# Patient Record
Sex: FEMALE | Race: WHITE | NOT HISPANIC OR LATINO | Employment: OTHER | URBAN - METROPOLITAN AREA
[De-identification: names, ages, dates, MRNs, and addresses within clinical notes are randomized per-mention and may not be internally consistent; named-entity substitution may affect disease eponyms.]

---

## 2017-03-13 ENCOUNTER — APPOINTMENT (OUTPATIENT)
Dept: CT IMAGING | Facility: HOSPITAL | Age: 82
End: 2017-03-13

## 2017-03-13 ENCOUNTER — HOSPITAL ENCOUNTER (EMERGENCY)
Facility: HOSPITAL | Age: 82
Discharge: HOME | End: 2017-03-13
Attending: EMERGENCY MEDICINE | Admitting: EMERGENCY MEDICINE

## 2017-03-13 ENCOUNTER — APPOINTMENT (OUTPATIENT)
Dept: GENERAL RADIOLOGY | Facility: HOSPITAL | Age: 82
End: 2017-03-13

## 2017-03-13 VITALS
HEART RATE: 61 BPM | DIASTOLIC BLOOD PRESSURE: 83 MMHG | HEIGHT: 64 IN | WEIGHT: 149 LBS | OXYGEN SATURATION: 98 % | TEMPERATURE: 98.4 F | BODY MASS INDEX: 25.44 KG/M2 | SYSTOLIC BLOOD PRESSURE: 164 MMHG | RESPIRATION RATE: 18 BRPM

## 2017-03-13 DIAGNOSIS — N39.0 ACUTE UTI: Primary | ICD-10-CM

## 2017-03-13 LAB
ALBUMIN SERPL-MCNC: 3.9 G/DL (ref 3.5–5.2)
ALBUMIN/GLOB SERPL: 1.3 G/DL
ALP SERPL-CCNC: 51 U/L (ref 39–117)
ALT SERPL W P-5'-P-CCNC: 15 U/L (ref 1–33)
ANION GAP SERPL CALCULATED.3IONS-SCNC: 11.6 MMOL/L
AST SERPL-CCNC: 19 U/L (ref 1–32)
BACTERIA UR QL AUTO: ABNORMAL /HPF
BASOPHILS # BLD AUTO: 0.02 10*3/MM3 (ref 0–0.2)
BASOPHILS NFR BLD AUTO: 0.3 % (ref 0–1.5)
BILIRUB SERPL-MCNC: 0.5 MG/DL (ref 0.1–1.2)
BILIRUB UR QL STRIP: NEGATIVE
BUN BLD-MCNC: 15 MG/DL (ref 8–23)
BUN/CREAT SERPL: 13.8 (ref 7–25)
CALCIUM SPEC-SCNC: 9.9 MG/DL (ref 8.2–9.6)
CHLORIDE SERPL-SCNC: 105 MMOL/L (ref 98–107)
CLARITY UR: CLEAR
CO2 SERPL-SCNC: 26.4 MMOL/L (ref 22–29)
COLOR UR: YELLOW
CREAT BLD-MCNC: 1.09 MG/DL (ref 0.57–1)
DEPRECATED RDW RBC AUTO: 48.5 FL (ref 37–54)
EOSINOPHIL # BLD AUTO: 0.18 10*3/MM3 (ref 0–0.7)
EOSINOPHIL NFR BLD AUTO: 2.9 % (ref 0.3–6.2)
ERYTHROCYTE [DISTWIDTH] IN BLOOD BY AUTOMATED COUNT: 15.1 % (ref 11.7–13)
GFR SERPL CREATININE-BSD FRML MDRD: 47 ML/MIN/1.73
GLOBULIN UR ELPH-MCNC: 3 GM/DL
GLUCOSE BLD-MCNC: 105 MG/DL (ref 65–99)
GLUCOSE UR STRIP-MCNC: NEGATIVE MG/DL
HCT VFR BLD AUTO: 41.6 % (ref 35.6–45.5)
HGB BLD-MCNC: 13.7 G/DL (ref 11.9–15.5)
HGB UR QL STRIP.AUTO: NEGATIVE
HYALINE CASTS UR QL AUTO: ABNORMAL /LPF
IMM GRANULOCYTES # BLD: 0 10*3/MM3 (ref 0–0.03)
IMM GRANULOCYTES NFR BLD: 0 % (ref 0–0.5)
KETONES UR QL STRIP: NEGATIVE
LEUKOCYTE ESTERASE UR QL STRIP.AUTO: ABNORMAL
LYMPHOCYTES # BLD AUTO: 1.62 10*3/MM3 (ref 0.9–4.8)
LYMPHOCYTES NFR BLD AUTO: 26.2 % (ref 19.6–45.3)
MCH RBC QN AUTO: 28.6 PG (ref 26.9–32)
MCHC RBC AUTO-ENTMCNC: 32.9 G/DL (ref 32.4–36.3)
MCV RBC AUTO: 86.8 FL (ref 80.5–98.2)
MONOCYTES # BLD AUTO: 0.52 10*3/MM3 (ref 0.2–1.2)
MONOCYTES NFR BLD AUTO: 8.4 % (ref 5–12)
NEUTROPHILS # BLD AUTO: 3.84 10*3/MM3 (ref 1.9–8.1)
NEUTROPHILS NFR BLD AUTO: 62.2 % (ref 42.7–76)
NITRITE UR QL STRIP: POSITIVE
PH UR STRIP.AUTO: 7.5 [PH] (ref 5–8)
PLATELET # BLD AUTO: 214 10*3/MM3 (ref 140–500)
PMV BLD AUTO: 10.8 FL (ref 6–12)
POTASSIUM BLD-SCNC: 4 MMOL/L (ref 3.5–5.2)
PROT SERPL-MCNC: 6.9 G/DL (ref 6–8.5)
PROT UR QL STRIP: NEGATIVE
RBC # BLD AUTO: 4.79 10*6/MM3 (ref 3.9–5.2)
RBC # UR: ABNORMAL /HPF
REF LAB TEST METHOD: ABNORMAL
SODIUM BLD-SCNC: 143 MMOL/L (ref 136–145)
SP GR UR STRIP: 1.01 (ref 1–1.03)
SQUAMOUS #/AREA URNS HPF: ABNORMAL /HPF
TROPONIN T SERPL-MCNC: <0.01 NG/ML (ref 0–0.03)
UROBILINOGEN UR QL STRIP: ABNORMAL
WBC NRBC COR # BLD: 6.18 10*3/MM3 (ref 4.5–10.7)
WBC UR QL AUTO: ABNORMAL /HPF

## 2017-03-13 PROCEDURE — 99285 EMERGENCY DEPT VISIT HI MDM: CPT

## 2017-03-13 PROCEDURE — 87186 SC STD MICRODIL/AGAR DIL: CPT | Performed by: EMERGENCY MEDICINE

## 2017-03-13 PROCEDURE — 84484 ASSAY OF TROPONIN QUANT: CPT | Performed by: EMERGENCY MEDICINE

## 2017-03-13 PROCEDURE — 81001 URINALYSIS AUTO W/SCOPE: CPT | Performed by: EMERGENCY MEDICINE

## 2017-03-13 PROCEDURE — 85025 COMPLETE CBC W/AUTO DIFF WBC: CPT | Performed by: EMERGENCY MEDICINE

## 2017-03-13 PROCEDURE — 93010 ELECTROCARDIOGRAM REPORT: CPT | Performed by: INTERNAL MEDICINE

## 2017-03-13 PROCEDURE — 80053 COMPREHEN METABOLIC PANEL: CPT | Performed by: EMERGENCY MEDICINE

## 2017-03-13 PROCEDURE — 87086 URINE CULTURE/COLONY COUNT: CPT | Performed by: EMERGENCY MEDICINE

## 2017-03-13 PROCEDURE — 71020 HC CHEST PA AND LATERAL: CPT

## 2017-03-13 PROCEDURE — 70450 CT HEAD/BRAIN W/O DYE: CPT

## 2017-03-13 PROCEDURE — 93005 ELECTROCARDIOGRAM TRACING: CPT | Performed by: EMERGENCY MEDICINE

## 2017-03-13 RX ORDER — CEPHALEXIN 500 MG/1
500 CAPSULE ORAL 3 TIMES DAILY
Qty: 21 CAPSULE | Refills: 0 | Status: ON HOLD | OUTPATIENT
Start: 2017-03-13 | End: 2017-05-29

## 2017-03-13 RX ORDER — SODIUM CHLORIDE 0.9 % (FLUSH) 0.9 %
10 SYRINGE (ML) INJECTION AS NEEDED
Status: DISCONTINUED | OUTPATIENT
Start: 2017-03-13 | End: 2017-03-13 | Stop reason: HOSPADM

## 2017-03-13 NOTE — ED NOTES
Pt denies shortness of breath, chest pains and weakness at this time. Pt states that she called [phone] this morning because she woke up in a panic. Pt called a neighbor and told them that both of her legs felt weak and that she may need help. The neighbor called 911 for the pt in case the patient was having a stroke. Pt denies history of stroke. Pt does not have any facial droop, extremity weakness or altered mental status at this time.      Ralph León RN  03/13/17 2052

## 2017-03-13 NOTE — ED NOTES
Pt refused catheter urine specimen and was assisted x2 with getting up and using a bedside comode for a clean catch specimen.      Ralph León RN  03/13/17 0319

## 2017-03-13 NOTE — ED PROVIDER NOTES
" EMERGENCY DEPARTMENT ENCOUNTER    CHIEF COMPLAINT  Chief Complaint: Feeling Panicked  History given by: Patient  History limited by: Patient is a vague historian   Room Number: 14/14  PMD: Waqar Melissa MD      HPI:  Pt reports that she awoke today \"feeling panicked\". Pt reports, \"I just wanted someone to talk to\", so she called her neighbors, who called the paramedics. Pt denies CP, focal lateral weakness/numbness, dyspnea, abd pain, and N/V/D. Pt states that she has had weakness to the lower extremities bilaterally (which has improved since initial onset). Pt reports that she had an endoscopy performed on 03/08/17 at another facility for recurrent episodes of foreign body sensation in throat. There are no other complaints at this time.     Location: To the lower extremities bilaterally  Radiation: None  Quality: \"weakness\"  Intensity/Severity: Moderate  Duration: Pt noticed sx upon waking today  Onset quality: Gradual  Timing: Constant  Progression: Improving  Aggravating Factors: Nothing  Alleviating Factors: Nothing  Previous Episodes: None  Treatment before arrival: None  Associated Symptoms: None      PAST MEDICAL HISTORY  Active Ambulatory Problems     Diagnosis Date Noted   • Abdominal pain, vomiting, and diarrhea 03/16/2016   • Chronic constipation 03/16/2016   • Hemorrhoid 03/16/2016   • BP (high blood pressure) 03/16/2016   • Arthralgia of hip 03/16/2016   • LBP (low back pain) 03/16/2016   • Disease of thyroid gland 03/16/2016     Resolved Ambulatory Problems     Diagnosis Date Noted   • No Resolved Ambulatory Problems     Past Medical History   Diagnosis Date   • Arthritis    • CHF (congestive heart failure)    • Coronary artery disease    • Diverticulitis    • Hypertension          PAST SURGICAL HISTORY  Past Surgical History   Procedure Laterality Date   • Tonsillectomy     • Hysterectomy     • Appendectomy     • Colon surgery       fistulectomy   • Abdominal surgery       liban   • Eye " surgery       cataract         FAMILY HISTORY  History reviewed. No pertinent family history.      SOCIAL HISTORY  Social History     Social History   • Marital status: Single     Spouse name: N/A   • Number of children: N/A   • Years of education: N/A     Occupational History   • Not on file.     Social History Main Topics   • Smoking status: Never Smoker   • Smokeless tobacco: Not on file   • Alcohol use Not on file   • Drug use: Not on file   • Sexual activity: Not on file     Other Topics Concern   • Not on file     Social History Narrative         ALLERGIES  Amoxicillin; Atorvastatin; Diazepam; Levoxyl  [levothyroxine sodium]; Lexapro  [escitalopram oxalate]; Lopressor  [metoprolol tartrate]; Metaxalone; Omeprazole; and Penicillins        REVIEW OF SYSTEMS  Review of Systems   Unable to perform ROS: Other (pt is a vague historian)   Respiratory: Negative for shortness of breath.    Cardiovascular: Negative for chest pain.   Gastrointestinal: Negative for abdominal pain, diarrhea, nausea and vomiting.   Neurological: Positive for weakness (bilateral lower extremities, but no focal lateral weakness). Negative for numbness.            PHYSICAL EXAM  ED Triage Vitals   Temp Heart Rate Resp BP SpO2   -- 03/13/17 1133 03/13/17 1133 03/13/17 1133 03/13/17 1133    65 18 206/81 98 % WNL      Temp src Heart Rate Source Patient Position BP Location FiO2 (%)   -- 03/13/17 1133 -- -- --    Monitor          Physical Exam   Constitutional: She is oriented to person, place, and time. No distress.   HENT:   Head: Normocephalic.   Mouth/Throat: Mucous membranes are normal.   Eyes: EOM are normal. Pupils are equal, round, and reactive to light.   Neck: Normal range of motion. Neck supple.   Cardiovascular: Normal rate, regular rhythm and normal heart sounds.    Pulmonary/Chest: Effort normal and breath sounds normal. No respiratory distress. She has no decreased breath sounds. She has no wheezes. She has no rhonchi. She has no  rales.   Abdominal: Soft. There is no tenderness. There is no rebound and no guarding.   Musculoskeletal: Normal range of motion.   Neurological: She is alert and oriented to person, place, and time. She has normal sensation and normal strength.   Pt has no weakness to the lower extremities bilaterally    Skin: Skin is warm and dry.   Psychiatric: Mood and affect normal.   Nursing note and vitals reviewed.          LAB RESULTS  Recent Results (from the past 24 hour(s))   Comprehensive Metabolic Panel    Collection Time: 03/13/17 11:55 AM   Result Value Ref Range    Glucose 105 (H) 65 - 99 mg/dL    BUN 15 8 - 23 mg/dL    Creatinine 1.09 (H) 0.57 - 1.00 mg/dL    Sodium 143 136 - 145 mmol/L    Potassium 4.0 3.5 - 5.2 mmol/L    Chloride 105 98 - 107 mmol/L    CO2 26.4 22.0 - 29.0 mmol/L    Calcium 9.9 (H) 8.2 - 9.6 mg/dL    Total Protein 6.9 6.0 - 8.5 g/dL    Albumin 3.90 3.50 - 5.20 g/dL    ALT (SGPT) 15 1 - 33 U/L    AST (SGOT) 19 1 - 32 U/L    Alkaline Phosphatase 51 39 - 117 U/L    Total Bilirubin 0.5 0.1 - 1.2 mg/dL    eGFR Non African Amer 47 (L) >60 mL/min/1.73    Globulin 3.0 gm/dL    A/G Ratio 1.3 g/dL    BUN/Creatinine Ratio 13.8 7.0 - 25.0    Anion Gap 11.6 mmol/L   Troponin    Collection Time: 03/13/17 11:55 AM   Result Value Ref Range    Troponin T <0.010 0.000 - 0.030 ng/mL   CBC Auto Differential    Collection Time: 03/13/17 11:55 AM   Result Value Ref Range    WBC 6.18 4.50 - 10.70 10*3/mm3    RBC 4.79 3.90 - 5.20 10*6/mm3    Hemoglobin 13.7 11.9 - 15.5 g/dL    Hematocrit 41.6 35.6 - 45.5 %    MCV 86.8 80.5 - 98.2 fL    MCH 28.6 26.9 - 32.0 pg    MCHC 32.9 32.4 - 36.3 g/dL    RDW 15.1 (H) 11.7 - 13.0 %    RDW-SD 48.5 37.0 - 54.0 fl    MPV 10.8 6.0 - 12.0 fL    Platelets 214 140 - 500 10*3/mm3    Neutrophil % 62.2 42.7 - 76.0 %    Lymphocyte % 26.2 19.6 - 45.3 %    Monocyte % 8.4 5.0 - 12.0 %    Eosinophil % 2.9 0.3 - 6.2 %    Basophil % 0.3 0.0 - 1.5 %    Immature Grans % 0.0 0.0 - 0.5 %    Neutrophils,  Absolute 3.84 1.90 - 8.10 10*3/mm3    Lymphocytes, Absolute 1.62 0.90 - 4.80 10*3/mm3    Monocytes, Absolute 0.52 0.20 - 1.20 10*3/mm3    Eosinophils, Absolute 0.18 0.00 - 0.70 10*3/mm3    Basophils, Absolute 0.02 0.00 - 0.20 10*3/mm3    Immature Grans, Absolute 0.00 0.00 - 0.03 10*3/mm3   Urinalysis With / Culture If Indicated    Collection Time: 03/13/17  1:33 PM   Result Value Ref Range    Color, UA Yellow Yellow, Straw    Appearance, UA Clear Clear    pH, UA 7.5 5.0 - 8.0    Specific Gravity, UA 1.007 1.005 - 1.030    Glucose, UA Negative Negative    Ketones, UA Negative Negative    Bilirubin, UA Negative Negative    Blood, UA Negative Negative    Protein, UA Negative Negative    Leuk Esterase, UA Trace (A) Negative    Nitrite, UA Positive (A) Negative    Urobilinogen, UA 0.2 E.U./dL 0.2 - 1.0 E.U./dL   Urinalysis, Microscopic Only    Collection Time: 03/13/17  1:33 PM   Result Value Ref Range    RBC, UA 0-2 None Seen, 0-2 /HPF    WBC, UA 6-12 (A) None Seen, 0-2 /HPF    Bacteria, UA 4+ (A) None Seen /HPF    Squamous Epithelial Cells, UA 0-2 None Seen, 0-2 /HPF    Hyaline Casts, UA None Seen None Seen /LPF    Methodology Automated Microscopy        Ordered the above labs and reviewed the results.        RADIOLOGY  CT Head Without Contrast - Negative acute. Interpreted by radiologist. Independently viewed by me.      XR Chest 2 View - Negative acute. Interpreted by radiologist. Independently viewed by me.             Ordered the above noted radiological studies. Reviewed by me in PACS.       PROCEDURES  Procedures        EKG:           EKG time: 11:54 AM  Rhythm/Rate: NSR rate 62  PVC present  QRS, axis: Normal QRS   ST and T waves: Normal ST     Interpreted Contemporaneously by me, independently viewed  Similar compared to prior 07/03/16      PROGRESS AND CONSULTS  ED Course   Comment By Time   2:26 PM  Patient presents with nonspecific symptoms of unclear etiology.  Denies any chest pain shortness of breath or  belly pain.  Patient does have evidence of urinary tract infection with positive nitrites and bacteria and white blood cells.  Patient will be given Keflex.  She is to follow-up with her doctor.  She is to return for any concerns. Zackery Zabala MD 03/13 1426     11:54 AM: UA, blood work, CXR, CT Head, and EKG ordered for further evaluation.     2:21 PM: Rechecked pt. Pt is resting comfortably and appears in no acute distress. Informed pt that her troponin is negative. CT Head and CXR are unremarkable. Pt's UA suggests that pt has a mild UTI --> will prescribe rx for abx. Pt advised to f/u with PMD. RTER warnings given. Pt agrees with plan for discharge.     Pt reports that her allergy to penicillins was very long ago and she is unsure what the allergy was.             MEDICAL DECISION MAKING      MDM  Number of Diagnoses or Management Options     Amount and/or Complexity of Data Reviewed  Clinical lab tests: ordered and reviewed (Troponin is negative.)  Tests in the radiology section of CPT®: ordered and reviewed (CT Head is negative acute. )  Tests in the medicine section of CPT®: reviewed and ordered (EKG interpreted.   )    Patient Progress  Patient progress: stable             DIAGNOSIS  Final diagnoses:   Acute UTI         DISPOSITION  Pt discharged.    DISCHARGE    Patient discharged in stable condition.    Reviewed implications of results, diagnosis, meds, responsibility to follow up, warning signs and symptoms of possible worsening, potential complications and reasons to return to ER.    Patient/Family voiced understanding of above instructions.    Discussed plan for discharge, as there is no emergent indication for admission.  Pt/family is agreeable and understands need for follow up and repeat testing.  Pt is aware that discharge does not mean that nothing is wrong but it indicates no emergency is present that requires admission and they must continue care with follow-up as given below or physician of  their choice.     FOLLOW-UP  Waqar Melissa MD  [address]  [address]  [place] [id number]  [phone]    Schedule an appointment as soon as possible for a visit           Medication List      Current Discharge Medication List      START taking these medications    Details   cephalexin (KEFLEX) 500 MG capsule Take 1 capsule by mouth 3 (Three) Times a Day.  Qty: 21 capsule, Refills: 0             Latest Documented Vital Signs:  As of 2:30 PM  BP- 164/83 HR- 61 Temp- 98.4 °F (36.9 °C) (Oral) O2 sat- 98%        --  Documentation assistance provided by henrique Johnson for Dr. Vj MD.  Information recorded by the scribe was done at my direction and has been verified and validated by me.                           Dav Johnson  03/13/17 1431       Zackery Zabala MD  03/13/17 1958

## 2017-03-13 NOTE — ED NOTES
Pt discharged with discharge instructions. Taught pt importance of taking medications as prescribed. Pt going home with friend, per pt request. Pt alert and oriented x4, pt was wheeled to front entrance. Pt had no questions at this time.     Shamika Corrales RN  03/13/17 [id number]

## 2017-03-15 LAB — BACTERIA SPEC AEROBE CULT: ABNORMAL

## 2017-03-16 ENCOUNTER — APPOINTMENT (OUTPATIENT)
Dept: GENERAL RADIOLOGY | Facility: HOSPITAL | Age: 82
End: 2017-03-16

## 2017-03-16 ENCOUNTER — TELEPHONE (OUTPATIENT)
Dept: URGENT CARE | Facility: CLINIC | Age: 82
End: 2017-03-16

## 2017-03-16 ENCOUNTER — HOSPITAL ENCOUNTER (EMERGENCY)
Facility: HOSPITAL | Age: 82
Discharge: HOME | End: 2017-03-17
Attending: EMERGENCY MEDICINE | Admitting: EMERGENCY MEDICINE

## 2017-03-16 ENCOUNTER — APPOINTMENT (OUTPATIENT)
Dept: CT IMAGING | Facility: HOSPITAL | Age: 82
End: 2017-03-16

## 2017-03-16 DIAGNOSIS — K59.00 CONSTIPATION, UNSPECIFIED CONSTIPATION TYPE: Primary | ICD-10-CM

## 2017-03-16 LAB
ALBUMIN SERPL-MCNC: 4.1 G/DL (ref 3.5–5.2)
ALBUMIN/GLOB SERPL: 1.3 G/DL
ALP SERPL-CCNC: 55 U/L (ref 39–117)
ALT SERPL W P-5'-P-CCNC: 17 U/L (ref 1–33)
ANION GAP SERPL CALCULATED.3IONS-SCNC: 13.3 MMOL/L
AST SERPL-CCNC: 23 U/L (ref 1–32)
BASOPHILS # BLD AUTO: 0.03 10*3/MM3 (ref 0–0.2)
BASOPHILS NFR BLD AUTO: 0.3 % (ref 0–1.5)
BILIRUB SERPL-MCNC: 0.6 MG/DL (ref 0.1–1.2)
BILIRUB UR QL STRIP: NEGATIVE
BUN BLD-MCNC: 15 MG/DL (ref 8–23)
BUN/CREAT SERPL: 14.3 (ref 7–25)
CALCIUM SPEC-SCNC: 9.7 MG/DL (ref 8.2–9.6)
CHLORIDE SERPL-SCNC: 96 MMOL/L (ref 98–107)
CLARITY UR: CLEAR
CO2 SERPL-SCNC: 24.7 MMOL/L (ref 22–29)
COLOR UR: YELLOW
CREAT BLD-MCNC: 1.05 MG/DL (ref 0.57–1)
DEPRECATED RDW RBC AUTO: 48.2 FL (ref 37–54)
EOSINOPHIL # BLD AUTO: 0.17 10*3/MM3 (ref 0–0.7)
EOSINOPHIL NFR BLD AUTO: 1.9 % (ref 0.3–6.2)
ERYTHROCYTE [DISTWIDTH] IN BLOOD BY AUTOMATED COUNT: 15 % (ref 11.7–13)
GFR SERPL CREATININE-BSD FRML MDRD: 49 ML/MIN/1.73
GLOBULIN UR ELPH-MCNC: 3.1 GM/DL
GLUCOSE BLD-MCNC: 88 MG/DL (ref 65–99)
GLUCOSE UR STRIP-MCNC: NEGATIVE MG/DL
HCT VFR BLD AUTO: 42.2 % (ref 35.6–45.5)
HGB BLD-MCNC: 14 G/DL (ref 11.9–15.5)
HGB UR QL STRIP.AUTO: NEGATIVE
IMM GRANULOCYTES # BLD: 0.03 10*3/MM3 (ref 0–0.03)
IMM GRANULOCYTES NFR BLD: 0.3 % (ref 0–0.5)
KETONES UR QL STRIP: NEGATIVE
LEUKOCYTE ESTERASE UR QL STRIP.AUTO: NEGATIVE
LYMPHOCYTES # BLD AUTO: 2 10*3/MM3 (ref 0.9–4.8)
LYMPHOCYTES NFR BLD AUTO: 22.6 % (ref 19.6–45.3)
MCH RBC QN AUTO: 29.2 PG (ref 26.9–32)
MCHC RBC AUTO-ENTMCNC: 33.2 G/DL (ref 32.4–36.3)
MCV RBC AUTO: 87.9 FL (ref 80.5–98.2)
MONOCYTES # BLD AUTO: 0.79 10*3/MM3 (ref 0.2–1.2)
MONOCYTES NFR BLD AUTO: 8.9 % (ref 5–12)
NEUTROPHILS # BLD AUTO: 5.83 10*3/MM3 (ref 1.9–8.1)
NEUTROPHILS NFR BLD AUTO: 66 % (ref 42.7–76)
NITRITE UR QL STRIP: NEGATIVE
PH UR STRIP.AUTO: 7 [PH] (ref 5–8)
PLATELET # BLD AUTO: 225 10*3/MM3 (ref 140–500)
PMV BLD AUTO: 11 FL (ref 6–12)
POTASSIUM BLD-SCNC: 3.8 MMOL/L (ref 3.5–5.2)
PROT SERPL-MCNC: 7.2 G/DL (ref 6–8.5)
PROT UR QL STRIP: NEGATIVE
RBC # BLD AUTO: 4.8 10*6/MM3 (ref 3.9–5.2)
SODIUM BLD-SCNC: 134 MMOL/L (ref 136–145)
SP GR UR STRIP: 1.01 (ref 1–1.03)
UROBILINOGEN UR QL STRIP: NORMAL
WBC NRBC COR # BLD: 8.85 10*3/MM3 (ref 4.5–10.7)

## 2017-03-16 PROCEDURE — 74177 CT ABD & PELVIS W/CONTRAST: CPT

## 2017-03-16 PROCEDURE — 96360 HYDRATION IV INFUSION INIT: CPT

## 2017-03-16 PROCEDURE — 96361 HYDRATE IV INFUSION ADD-ON: CPT

## 2017-03-16 PROCEDURE — 81003 URINALYSIS AUTO W/O SCOPE: CPT | Performed by: EMERGENCY MEDICINE

## 2017-03-16 PROCEDURE — 80053 COMPREHEN METABOLIC PANEL: CPT | Performed by: NURSE PRACTITIONER

## 2017-03-16 PROCEDURE — 99284 EMERGENCY DEPT VISIT MOD MDM: CPT

## 2017-03-16 PROCEDURE — 0 IOPAMIDOL 61 % SOLUTION: Performed by: EMERGENCY MEDICINE

## 2017-03-16 PROCEDURE — 90791 PSYCH DIAGNOSTIC EVALUATION: CPT

## 2017-03-16 PROCEDURE — 85025 COMPLETE CBC W/AUTO DIFF WBC: CPT | Performed by: NURSE PRACTITIONER

## 2017-03-16 RX ORDER — POLYETHYLENE GLYCOL 3350 17 G/17G
17 POWDER, FOR SOLUTION ORAL DAILY
Qty: 30 EACH | Refills: 0 | Status: ON HOLD | OUTPATIENT
Start: 2017-03-16 | End: 2017-05-29

## 2017-03-16 RX ORDER — SODIUM CHLORIDE 0.9 % (FLUSH) 0.9 %
10 SYRINGE (ML) INJECTION AS NEEDED
Status: DISCONTINUED | OUTPATIENT
Start: 2017-03-16 | End: 2017-03-17 | Stop reason: HOSPADM

## 2017-03-16 RX ADMIN — IOPAMIDOL 85 ML: 612 INJECTION, SOLUTION INTRAVENOUS at 21:45

## 2017-03-16 RX ADMIN — SODIUM CHLORIDE 1000 ML: 9 INJECTION, SOLUTION INTRAVENOUS at 21:29

## 2017-03-16 NOTE — TELEPHONE ENCOUNTER
Patient called and said she was very constipated she called her doctor(not sure which one) and they advised her to take miralax and drink plenty of water.  She stated her last bowl movement was 3/13/17.  She stated she was very nervous and had taken 1/2 of a nerve pill.  She stated she did not know what to do.  Per Dr. Beckman she was advised to go to the ED.  I ask her if she would like for me to call EMS.  She stated no, I will call my friend, if I don't reach her I will call you back.  I again offered to call EMS, she declined the offer. [place] EDWARDS

## 2017-03-17 VITALS
HEART RATE: 80 BPM | HEIGHT: 64 IN | RESPIRATION RATE: 16 BRPM | OXYGEN SATURATION: 98 % | DIASTOLIC BLOOD PRESSURE: 87 MMHG | SYSTOLIC BLOOD PRESSURE: 178 MMHG | WEIGHT: 151 LBS | BODY MASS INDEX: 25.78 KG/M2 | TEMPERATURE: 98.7 F

## 2017-03-17 PROCEDURE — 90791 PSYCH DIAGNOSTIC EVALUATION: CPT

## 2017-03-17 NOTE — CONSULTS
"89 yo white female evaluated in ED (Room#16) BIB friend Fatimah Rivera. Patient c/o constipation. Single, no children. Estranged from family. States her friend Fatimah is an \"shalini\". Patient completed a swallow study recently at [facility] and was told she may get constipated as a result. Patient lives alone but states she has help from her friends. Patient very angry with her family stating she wants to \"slap Sophy in the face\". Sophy is patient's sister. Patient also states she has \"internal hemorrhoids\". Patient is not suicidal. Patient is not homicidal. No overt psychosis. Patient denies previous psychiatric history. Appears alert and oriented times 4. Patient does not meet criteria for inpatient psych admit. Patient not interested in outpatient therapy. Patient focused on her constipation. Constipation is chronic per chart. Will refer back to ED.   "

## 2017-03-17 NOTE — ED NOTES
Changed pt and noted redness to percy area and redness on buttocks.      Amalia Valles RN  03/16/17 3570

## 2017-03-17 NOTE — ED PROVIDER NOTES
"  EMERGENCY DEPARTMENT ENCOUNTER    CHIEF COMPLAINT  Chief Complaint: constipation  History given by: patient  History limited by: N/A  Room Number: 16/16  PMD: Waqar Melissa MD      HPI:  Pt is a 89 y.o. female who presents with constipation which started about 1 week ago (last reported bowel movement was on 3/8/17). She has also had \"fullness in upper abd\" and nausea. She denies vomiting, diarrhea, chest pain, trouble breathing, fevers, chills, acute change in chronic dysphagia, and acute change in chronic bladder incontinence. She reports that she called her PMD's office regarding current sx and was advised to take miralax and to drink plenty of fluids. However, despite following these recommendations, her sx have not improved. She reports undergoing imaging of her esophagus on 3/8/17 prior to sx onset. Past Medical History of internal hemorrhoids, diverticulitis, CAD, C Diff colitis, and HTN.     Duration: started about 1 week ago  Timing: constant  Location: GI  Radiation: none  Quality: fullness  Intensity/Severity: moderate  Progression: unchanged  Associated Symptoms: \"fullness in upper abd\", nausea  Aggravating Factors: eating  Alleviating Factors: none  Previous Episodes: yes  Treatment before arrival: pt took miralax and drank fluids without sx relief     PAST MEDICAL HISTORY  Active Ambulatory Problems     Diagnosis Date Noted   • Abdominal pain, vomiting, and diarrhea 03/16/2016   • Chronic constipation 03/16/2016   • Hemorrhoid 03/16/2016   • BP (high blood pressure) 03/16/2016   • Arthralgia of hip 03/16/2016   • LBP (low back pain) 03/16/2016   • Disease of thyroid gland 03/16/2016     Resolved Ambulatory Problems     Diagnosis Date Noted   • No Resolved Ambulatory Problems     Past Medical History   Diagnosis Date   • Arthritis    • CHF (congestive heart failure)    • Coronary artery disease    • Diverticulitis    • Hypertension        PAST SURGICAL HISTORY  Past Surgical History   Procedure " "Laterality Date   • Tonsillectomy     • Hysterectomy     • Appendectomy     • Colon surgery       fistulectomy   • Abdominal surgery       liban   • Eye surgery       cataract       FAMILY HISTORY  History reviewed. No pertinent family history.    SOCIAL HISTORY  Social History     Social History   • Marital status: Single     Spouse name: N/A   • Number of children: N/A   • Years of education: N/A     Occupational History   • Not on file.     Social History Main Topics   • Smoking status: Never Smoker   • Smokeless tobacco: Not on file   • Alcohol use Not on file   • Drug use: Not on file   • Sexual activity: Not on file     Other Topics Concern   • Not on file     Social History Narrative         ALLERGIES  Amoxicillin; Atorvastatin; Diazepam; Levoxyl  [levothyroxine sodium]; Lexapro  [escitalopram oxalate]; Lopressor  [metoprolol tartrate]; Metaxalone; Omeprazole; and Penicillins    REVIEW OF SYSTEMS  Review of Systems   Constitutional: Negative for chills and fever.   HENT: Negative for sore throat. Trouble swallowing: chronic, no acute change.    Respiratory: Negative for cough and shortness of breath.    Cardiovascular: Negative for chest pain.   Gastrointestinal: Positive for abdominal pain (\"fullness in upper abd\"), constipation and nausea. Negative for diarrhea and vomiting.   Genitourinary: Negative for dysuria.        No acute change of chronic bladder incontinence   Musculoskeletal: Negative for back pain.   Skin: Negative for rash.   Neurological: Negative for dizziness.   Psychiatric/Behavioral: The patient is not nervous/anxious.        PHYSICAL EXAM  ED Triage Vitals   Temp Heart Rate Resp BP SpO2   03/1934 03/1934 03/1934 03/1934 03/1934   97.6 °F (36.4 °C) 84 18 203/97 96 % WNL       Physical Exam   Constitutional: She is oriented to person, place, and time and well-developed, well-nourished, and in no distress.   HENT:   Head: Normocephalic.   Mouth/Throat: Mucous " membranes are normal.   Eyes: No scleral icterus.   Neck: Normal range of motion.   Cardiovascular: Normal rate, regular rhythm and normal heart sounds.    Pulmonary/Chest: Effort normal and breath sounds normal. No respiratory distress.   Abdominal: Soft. She exhibits no distension. There is no tenderness. There is no rebound and no guarding.   Genitourinary: Rectal exam shows guaiac negative stool (heme negative).   Genitourinary Comments: No fecal impaction     Rectal exam performed with ER tech at bedside   Musculoskeletal: Normal range of motion.   Neurological: She is alert and oriented to person, place, and time. She has normal sensation and normal strength.   Skin: Skin is warm and dry.   Psychiatric: Her mood appears anxious.   Tearful    Nursing note and vitals reviewed.      LAB RESULTS  Recent Results (from the past 24 hour(s))   Comprehensive Metabolic Panel    Collection Time: 03/16/17  9:28 PM   Result Value Ref Range    Glucose 88 65 - 99 mg/dL    BUN 15 8 - 23 mg/dL    Creatinine 1.05 (H) 0.57 - 1.00 mg/dL    Sodium 134 (L) 136 - 145 mmol/L    Potassium 3.8 3.5 - 5.2 mmol/L    Chloride 96 (L) 98 - 107 mmol/L    CO2 24.7 22.0 - 29.0 mmol/L    Calcium 9.7 (H) 8.2 - 9.6 mg/dL    Total Protein 7.2 6.0 - 8.5 g/dL    Albumin 4.10 3.50 - 5.20 g/dL    ALT (SGPT) 17 1 - 33 U/L    AST (SGOT) 23 1 - 32 U/L    Alkaline Phosphatase 55 39 - 117 U/L    Total Bilirubin 0.6 0.1 - 1.2 mg/dL    eGFR Non African Amer 49 (L) >60 mL/min/1.73    Globulin 3.1 gm/dL    A/G Ratio 1.3 g/dL    BUN/Creatinine Ratio 14.3 7.0 - 25.0    Anion Gap 13.3 mmol/L   CBC Auto Differential    Collection Time: 03/16/17  9:28 PM   Result Value Ref Range    WBC 8.85 4.50 - 10.70 10*3/mm3    RBC 4.80 3.90 - 5.20 10*6/mm3    Hemoglobin 14.0 11.9 - 15.5 g/dL    Hematocrit 42.2 35.6 - 45.5 %    MCV 87.9 80.5 - 98.2 fL    MCH 29.2 26.9 - 32.0 pg    MCHC 33.2 32.4 - 36.3 g/dL    RDW 15.0 (H) 11.7 - 13.0 %    RDW-SD 48.2 37.0 - 54.0 fl    MPV 11.0  6.0 - 12.0 fL    Platelets 225 140 - 500 10*3/mm3    Neutrophil % 66.0 42.7 - 76.0 %    Lymphocyte % 22.6 19.6 - 45.3 %    Monocyte % 8.9 5.0 - 12.0 %    Eosinophil % 1.9 0.3 - 6.2 %    Basophil % 0.3 0.0 - 1.5 %    Immature Grans % 0.3 0.0 - 0.5 %    Neutrophils, Absolute 5.83 1.90 - 8.10 10*3/mm3    Lymphocytes, Absolute 2.00 0.90 - 4.80 10*3/mm3    Monocytes, Absolute 0.79 0.20 - 1.20 10*3/mm3    Eosinophils, Absolute 0.17 0.00 - 0.70 10*3/mm3    Basophils, Absolute 0.03 0.00 - 0.20 10*3/mm3    Immature Grans, Absolute 0.03 0.00 - 0.03 10*3/mm3   Urinalysis With / Culture If Indicated    Collection Time: 17 10:04 PM   Result Value Ref Range    Color, UA Yellow Yellow, Straw    Appearance, UA Clear Clear    pH, UA 7.0 5.0 - 8.0    Specific Gravity, UA 1.011 1.005 - 1.030    Glucose, UA Negative Negative    Ketones, UA Negative Negative    Bilirubin, UA Negative Negative    Blood, UA Negative Negative    Protein, UA Negative Negative    Leuk Esterase, UA Negative Negative    Nitrite, UA Negative Negative    Urobilinogen, UA 0.2 E.U./dL 0.2 - 1.0 E.U./dL       I ordered the above labs and reviewed the results      RADIOLOGY         CT Abdomen Pelvis With Contrast (Final result) Result time: 17 22:35:35     Final result by Arsh Landrum MD (17 22:35:35)     Narrative:     CT SCAN OF THE ABDOMEN AND PELVIS WITH INTRAVENOUS CONTRAST     HISTORY: Abdominal pain. Constipation.     FINDINGS: The CT scan was performed as an emergency procedure through  the abdomen and pelvis with intravenous contrast and demonstrates the  followin. There is a large amount of dense stool mixed with barium throughout  the colon, particularly in the descending and sigmoid colon. This is  combined with multiple slightly prominent fluid-filled small bowel loops  and the appearance is most consistent with barium and fecal impaction.  There is extensive sigmoid diverticulosis but no CT evidence of  diverticulitis.  This appearance of the sigmoid is similar to the study  of 03/16/2016.  2. The lung bases are clear. Allowing for some motion artifact and  artifact from the dense stool, the liver, spleen, pancreas, both adrenal  glands, and both kidneys are unremarkable.  3. There is slight aneurysmal enlargement of the infrarenal aorta  measuring 2.5 cm in diameter which is unchanged. There is no  retroperitoneal lymphadenopathy. The urinary bladder has a smooth  contour. The remainder of the CT scan is unremarkable.     This report was finalized on 3/16/2017 10:35 PM by Dr. Trell Landrum MD.            I ordered the above noted radiological studies and reviewed the images on the PACS system.  Spoke with Dr. Landrum (radiologist) regarding CT scan results        PROGRESS AND CONSULTS  9:07 PM- Reviewed pt's history and workup with Dr. Helton.  At bedside evaluation, they agree with the plan of care.  9:10 PM- Ordered IV fluids for hydration.   10:10 PM- CT abd/pel shows large stool burden. Ordered milk and molasses enema.  10:15 PM- Pt is feeling anxious. Consulted [facility] for further evaluation.   11:06 PM- Pt had no results with enema.   11:18 PM- [facility] RN has discussed with pt about possible home health options since she lives alone. Pt declined all offers.   11:32 PM- [facility] RN has seen and evaluated pt and has cleared her for discharge.  11:37 PM- Friend now at bedside. Rechecked pt. She is resting comfortably and is in no acute distress. Reviewed implications of results (stable labs and large stool burden indicated on CT abd/pel), diagnosis, meds, responsibility to follow up, warning signs and symptoms of possible worsening, potential complications and reasons to return to ER with patient.  Discussed all results and noted any abnormalities with patient.  Discussed absolute need to recheck abnormalities and condition with PMD. Advised pt to increase fluid intake and to increase fiber in diet. Informed pt of plan to prescribe  "miralax.  Discussed plan for discharge, as there is no emergent indication for admission.  Pt is agreeable and understands need for follow up and repeat testing.  Pt is aware that discharge does not mean that nothing is wrong but it indicates no emergency is present.  Pt is discharged with instructions to follow up with primary care doctor to have their blood pressure rechecked.       DIAGNOSIS  Final diagnoses:   Constipation, unspecified constipation type       FOLLOW UP   Waqar Melissa MD  [address]  [address]  [place] [address]  [phone]    Call in 1 day        RX     polyethylene glycol packet   Commonly known as:  MIRALAX   Take 17 g by mouth Daily.          COURSE & MEDICAL DECISION MAKING  Pertinent Labs and Imaging studies that were ordered and reviewed are noted above.  Results were reviewed/discussed with the patient and they were also made aware of online assess.   Pt also made aware that some labs, such as cultures, will not be resulted during ER visit and follow up with PMD is necessary.     MEDICATIONS GIVEN IN ER  Medications   sodium chloride 0.9 % flush 10 mL (not administered)   sodium chloride 0.9 % bolus 1,000 mL (1,000 mL Intravenous New Bag 3/16/17 2129)   iopamidol (ISOVUE-300) 61 % injection 100 mL (85 mL Intravenous Given 3/16/17 2145)       Visit Vitals   • /88   • Pulse 84   • Temp 97.6 °F (36.4 °C)   • Resp 17   • Ht 64\" (162.6 cm)   • Wt 151 lb (68.5 kg)   • SpO2 100%   • BMI 25.92 kg/m2         I personally reviewed the past medical history, past surgical history, social history, family history, current medications and allergies as they appear in this chart.  The scribe's note accurately reflects the work and decisions made by me.     I personally scribed for ARIANA Pitts on 3/16/2017 at 11:25 PM.  Electronically signed by Yana Johnson on 3/16/2017 at time 11:25 PM       Yana Johnson  03/16/17 [id number]       FRANCISCO King  03/16/17 " [id number]

## 2017-03-17 NOTE — ED PROVIDER NOTES
"Attending Note    History:   Pt arrives to the ED c/o constipation for about a week, with last BM on March 8th. She has tried Miralax to no relief. She states that she also has internal hemorrhoids which are painful. She has some abd pain described as \"sorenes\".    Exam:   Heart regular rate and rhythm, lungs clear to auscultation. There is some moderate LLQ tenderness. Bowel sounds normal. There are no other abnormal findings noted.    Course:   Labs, CT abd/pelvis have been ordered for further analysis.     11:27 PM: CT abd/pelvis showed large stool burden, no other acute disease. Enema was given, [facility] saw the pt, and she did not wish to receive any alternative living arrangements with discussions with CCP. She will be d/c home.    Attestation:  I supervised care provided by the midlevel provider.    We have discussed this patient's history, physical exam, and treatment plan.   I have reviewed the note and personally saw and examined the patient and agree with the plan of care.  I agree with the midlevel provider's findings and plan.  I reviewed the midlevel providers note.    Documentation assistance provided by henrique Tubbs for Dr. Helton.  Information recorded by the henrique was done at my direction and has been verified and validated by me.         Rudolph Tubbs  03/16/17 [id number]       Rudolph Tubbs  03/16/17 3146       Prosper Helton MD  03/17/17 0246    "

## 2017-03-17 NOTE — DISCHARGE INSTRUCTIONS
Medications as ordered  Increase water and fiber in diet  Follow up with pmd in 1-3 days for recheck  Return to er for fever, vomiting, abdominal pain or any new or worsening symptoms

## 2017-03-17 NOTE — ED NOTES
"Pt called out, stating, \"urine is pouring out of me.\"  Upon entering pt's room with SONIA Holland, pt began to complain about not wanting to be laid down for brief change.  Attempted to explain to pt that in order to change her brief, we would need to be able to roll her from side to side.  Pt then went on to complain that she needed pepcid IV, \"for the urine,\" and this nurse again tried to reorient pt to situation, reassure pt, and explain that pepcid would not help a urinary issue.  Explained to pt that we could not let her remain in a wet brief because we did not want her to have skin issues.  Pt's existing brief slightly wet.  Assisted pt to roll to R side, to which she objected, stating that it was, \"hurting my breathing.\"  Pt's SpO2 remained at 98% on RA, respirations regular rate, no signs of distress noted.  Clean, dry brief applied to pt.  Continuously attempted to reassure pt, who made various statements such as, \"do I need to get my .\"  Advised pt that I would notify [facility] of her c/o pain and see if she would order anything for it.  Notified TALA Hernández, of same.     Digna Bonner RN  03/16/17 [id number]    "

## 2017-04-27 ENCOUNTER — HOSPITAL ENCOUNTER (EMERGENCY)
Facility: HOSPITAL | Age: 82
Discharge: HOME | End: 2017-04-27
Attending: EMERGENCY MEDICINE | Admitting: EMERGENCY MEDICINE

## 2017-04-27 VITALS
DIASTOLIC BLOOD PRESSURE: 74 MMHG | OXYGEN SATURATION: 97 % | HEART RATE: 66 BPM | RESPIRATION RATE: 18 BRPM | SYSTOLIC BLOOD PRESSURE: 181 MMHG | HEIGHT: 62 IN | BODY MASS INDEX: 27.6 KG/M2 | WEIGHT: 150 LBS | TEMPERATURE: 97.5 F

## 2017-04-27 DIAGNOSIS — I10 ESSENTIAL HYPERTENSION: Primary | ICD-10-CM

## 2017-04-27 DIAGNOSIS — R53.1 GENERAL WEAKNESS: ICD-10-CM

## 2017-04-27 LAB
ALBUMIN SERPL-MCNC: 3.8 G/DL (ref 3.5–5.2)
ALBUMIN/GLOB SERPL: 1.1 G/DL
ALP SERPL-CCNC: 53 U/L (ref 39–117)
ALT SERPL W P-5'-P-CCNC: 13 U/L (ref 1–33)
ANION GAP SERPL CALCULATED.3IONS-SCNC: 13.7 MMOL/L
AST SERPL-CCNC: 24 U/L (ref 1–32)
BACTERIA UR QL AUTO: ABNORMAL /HPF
BASOPHILS # BLD AUTO: 0.02 10*3/MM3 (ref 0–0.2)
BASOPHILS NFR BLD AUTO: 0.3 % (ref 0–1.5)
BILIRUB SERPL-MCNC: 0.6 MG/DL (ref 0.1–1.2)
BILIRUB UR QL STRIP: NEGATIVE
BUN BLD-MCNC: 19 MG/DL (ref 8–23)
BUN/CREAT SERPL: 18.4 (ref 7–25)
CALCIUM SPEC-SCNC: 10.2 MG/DL (ref 8.2–9.6)
CHLORIDE SERPL-SCNC: 102 MMOL/L (ref 98–107)
CLARITY UR: CLEAR
CO2 SERPL-SCNC: 25.3 MMOL/L (ref 22–29)
COLOR UR: YELLOW
CREAT BLD-MCNC: 1.03 MG/DL (ref 0.57–1)
DEPRECATED RDW RBC AUTO: 50 FL (ref 37–54)
EOSINOPHIL # BLD AUTO: 0.18 10*3/MM3 (ref 0–0.7)
EOSINOPHIL NFR BLD AUTO: 2.6 % (ref 0.3–6.2)
ERYTHROCYTE [DISTWIDTH] IN BLOOD BY AUTOMATED COUNT: 15.5 % (ref 11.7–13)
GFR SERPL CREATININE-BSD FRML MDRD: 50 ML/MIN/1.73
GLOBULIN UR ELPH-MCNC: 3.6 GM/DL
GLUCOSE BLD-MCNC: 95 MG/DL (ref 65–99)
GLUCOSE UR STRIP-MCNC: NEGATIVE MG/DL
HCT VFR BLD AUTO: 42.5 % (ref 35.6–45.5)
HGB BLD-MCNC: 14 G/DL (ref 11.9–15.5)
HGB UR QL STRIP.AUTO: NEGATIVE
HYALINE CASTS UR QL AUTO: ABNORMAL /LPF
IMM GRANULOCYTES # BLD: 0 10*3/MM3 (ref 0–0.03)
IMM GRANULOCYTES NFR BLD: 0 % (ref 0–0.5)
KETONES UR QL STRIP: NEGATIVE
LEUKOCYTE ESTERASE UR QL STRIP.AUTO: ABNORMAL
LYMPHOCYTES # BLD AUTO: 1.92 10*3/MM3 (ref 0.9–4.8)
LYMPHOCYTES NFR BLD AUTO: 27.8 % (ref 19.6–45.3)
MCH RBC QN AUTO: 29.4 PG (ref 26.9–32)
MCHC RBC AUTO-ENTMCNC: 32.9 G/DL (ref 32.4–36.3)
MCV RBC AUTO: 89.3 FL (ref 80.5–98.2)
MONOCYTES # BLD AUTO: 0.66 10*3/MM3 (ref 0.2–1.2)
MONOCYTES NFR BLD AUTO: 9.6 % (ref 5–12)
NEUTROPHILS # BLD AUTO: 4.12 10*3/MM3 (ref 1.9–8.1)
NEUTROPHILS NFR BLD AUTO: 59.7 % (ref 42.7–76)
NITRITE UR QL STRIP: POSITIVE
PH UR STRIP.AUTO: 6 [PH] (ref 5–8)
PLATELET # BLD AUTO: 197 10*3/MM3 (ref 140–500)
PMV BLD AUTO: 11.2 FL (ref 6–12)
POTASSIUM BLD-SCNC: 4.4 MMOL/L (ref 3.5–5.2)
PROT SERPL-MCNC: 7.4 G/DL (ref 6–8.5)
PROT UR QL STRIP: NEGATIVE
RBC # BLD AUTO: 4.76 10*6/MM3 (ref 3.9–5.2)
RBC # UR: ABNORMAL /HPF
REF LAB TEST METHOD: ABNORMAL
SODIUM BLD-SCNC: 141 MMOL/L (ref 136–145)
SP GR UR STRIP: 1.01 (ref 1–1.03)
SQUAMOUS #/AREA URNS HPF: ABNORMAL /HPF
UROBILINOGEN UR QL STRIP: ABNORMAL
WBC NRBC COR # BLD: 6.9 10*3/MM3 (ref 4.5–10.7)
WBC UR QL AUTO: ABNORMAL /HPF

## 2017-04-27 PROCEDURE — 81001 URINALYSIS AUTO W/SCOPE: CPT | Performed by: EMERGENCY MEDICINE

## 2017-04-27 PROCEDURE — 85025 COMPLETE CBC W/AUTO DIFF WBC: CPT | Performed by: EMERGENCY MEDICINE

## 2017-04-27 PROCEDURE — 99284 EMERGENCY DEPT VISIT MOD MDM: CPT

## 2017-04-27 PROCEDURE — 87086 URINE CULTURE/COLONY COUNT: CPT | Performed by: EMERGENCY MEDICINE

## 2017-04-27 PROCEDURE — 87186 SC STD MICRODIL/AGAR DIL: CPT | Performed by: EMERGENCY MEDICINE

## 2017-04-27 PROCEDURE — 80053 COMPREHEN METABOLIC PANEL: CPT | Performed by: EMERGENCY MEDICINE

## 2017-04-27 RX ORDER — AMLODIPINE BESYLATE 5 MG/1
5 TABLET ORAL
Status: DISCONTINUED | OUTPATIENT
Start: 2017-04-27 | End: 2017-04-27 | Stop reason: HOSPADM

## 2017-04-27 RX ORDER — CARVEDILOL 3.12 MG/1
3.12 TABLET ORAL ONCE
Status: COMPLETED | OUTPATIENT
Start: 2017-04-27 | End: 2017-04-27

## 2017-04-27 RX ADMIN — AMLODIPINE BESYLATE 5 MG: 5 TABLET ORAL at 18:19

## 2017-04-27 RX ADMIN — CARVEDILOL 3.12 MG: 3.12 TABLET, FILM COATED ORAL at 18:49

## 2017-04-29 LAB — BACTERIA SPEC AEROBE CULT: ABNORMAL

## 2017-05-28 ENCOUNTER — APPOINTMENT (OUTPATIENT)
Dept: CT IMAGING | Facility: HOSPITAL | Age: 82
End: 2017-05-28

## 2017-05-28 ENCOUNTER — HOSPITAL ENCOUNTER (INPATIENT)
Facility: HOSPITAL | Age: 82
LOS: 2 days | Discharge: HOME | End: 2017-05-30
Attending: EMERGENCY MEDICINE | Admitting: INTERNAL MEDICINE

## 2017-05-28 DIAGNOSIS — R19.7 DIARRHEA, UNSPECIFIED TYPE: ICD-10-CM

## 2017-05-28 DIAGNOSIS — K52.9 COLITIS: Primary | ICD-10-CM

## 2017-05-28 DIAGNOSIS — Z74.09 IMPAIRED MOBILITY: ICD-10-CM

## 2017-05-28 PROBLEM — N18.30 CHRONIC KIDNEY DISEASE, STAGE III (MODERATE) (HCC): Status: ACTIVE | Noted: 2017-05-28

## 2017-05-28 PROBLEM — I10 BENIGN ESSENTIAL HTN: Status: ACTIVE | Noted: 2017-05-28

## 2017-05-28 PROBLEM — I50.22 CHRONIC SYSTOLIC CHF (CONGESTIVE HEART FAILURE) (HCC): Status: ACTIVE | Noted: 2017-05-28

## 2017-05-28 PROBLEM — R53.81 DEBILITY: Status: ACTIVE | Noted: 2017-05-28

## 2017-05-28 LAB
ALBUMIN SERPL-MCNC: 4 G/DL (ref 3.5–5.2)
ALBUMIN/GLOB SERPL: 1.2 G/DL
ALP SERPL-CCNC: 78 U/L (ref 39–117)
ALT SERPL W P-5'-P-CCNC: 21 U/L (ref 1–33)
ANION GAP SERPL CALCULATED.3IONS-SCNC: 13.8 MMOL/L
AST SERPL-CCNC: 28 U/L (ref 1–32)
BACTERIA UR QL AUTO: ABNORMAL /HPF
BASOPHILS # BLD AUTO: 0.03 10*3/MM3 (ref 0–0.2)
BASOPHILS NFR BLD AUTO: 0.2 % (ref 0–1.5)
BILIRUB SERPL-MCNC: 0.8 MG/DL (ref 0.1–1.2)
BILIRUB UR QL STRIP: NEGATIVE
BUN BLD-MCNC: 20 MG/DL (ref 8–23)
BUN/CREAT SERPL: 16 (ref 7–25)
CALCIUM SPEC-SCNC: 10 MG/DL (ref 8.2–9.6)
CHLORIDE SERPL-SCNC: 96 MMOL/L (ref 98–107)
CLARITY UR: ABNORMAL
CO2 SERPL-SCNC: 26.2 MMOL/L (ref 22–29)
COLOR UR: ABNORMAL
CREAT BLD-MCNC: 1.25 MG/DL (ref 0.57–1)
D-LACTATE SERPL-SCNC: 1.2 MMOL/L (ref 0.5–2)
DEPRECATED RDW RBC AUTO: 48.1 FL (ref 37–54)
EOSINOPHIL # BLD AUTO: 0.07 10*3/MM3 (ref 0–0.7)
EOSINOPHIL NFR BLD AUTO: 0.5 % (ref 0.3–6.2)
ERYTHROCYTE [DISTWIDTH] IN BLOOD BY AUTOMATED COUNT: 15.1 % (ref 11.7–13)
GFR SERPL CREATININE-BSD FRML MDRD: 40 ML/MIN/1.73
GLOBULIN UR ELPH-MCNC: 3.4 GM/DL
GLUCOSE BLD-MCNC: 123 MG/DL (ref 65–99)
GLUCOSE UR STRIP-MCNC: NEGATIVE MG/DL
HCT VFR BLD AUTO: 43.6 % (ref 35.6–45.5)
HGB BLD-MCNC: 14.8 G/DL (ref 11.9–15.5)
HGB UR QL STRIP.AUTO: NEGATIVE
HOLD SPECIMEN: NORMAL
HOLD SPECIMEN: NORMAL
HYALINE CASTS UR QL AUTO: ABNORMAL /LPF
IMM GRANULOCYTES # BLD: 0.05 10*3/MM3 (ref 0–0.03)
IMM GRANULOCYTES NFR BLD: 0.4 % (ref 0–0.5)
KETONES UR QL STRIP: ABNORMAL
LEUKOCYTE ESTERASE UR QL STRIP.AUTO: ABNORMAL
LIPASE SERPL-CCNC: 26 U/L (ref 13–60)
LYMPHOCYTES # BLD AUTO: 1.36 10*3/MM3 (ref 0.9–4.8)
LYMPHOCYTES NFR BLD AUTO: 9.6 % (ref 19.6–45.3)
MCH RBC QN AUTO: 29.4 PG (ref 26.9–32)
MCHC RBC AUTO-ENTMCNC: 33.9 G/DL (ref 32.4–36.3)
MCV RBC AUTO: 86.5 FL (ref 80.5–98.2)
MONOCYTES # BLD AUTO: 1.4 10*3/MM3 (ref 0.2–1.2)
MONOCYTES NFR BLD AUTO: 9.8 % (ref 5–12)
NEUTROPHILS # BLD AUTO: 11.31 10*3/MM3 (ref 1.9–8.1)
NEUTROPHILS NFR BLD AUTO: 79.5 % (ref 42.7–76)
NITRITE UR QL STRIP: POSITIVE
PH UR STRIP.AUTO: <=5 [PH] (ref 5–8)
PLATELET # BLD AUTO: 250 10*3/MM3 (ref 140–500)
PMV BLD AUTO: 10.8 FL (ref 6–12)
POTASSIUM BLD-SCNC: 4.3 MMOL/L (ref 3.5–5.2)
PROT SERPL-MCNC: 7.4 G/DL (ref 6–8.5)
PROT UR QL STRIP: NEGATIVE
RBC # BLD AUTO: 5.04 10*6/MM3 (ref 3.9–5.2)
RBC # UR: ABNORMAL /HPF
REF LAB TEST METHOD: ABNORMAL
SODIUM BLD-SCNC: 136 MMOL/L (ref 136–145)
SP GR UR STRIP: 1.01 (ref 1–1.03)
SQUAMOUS #/AREA URNS HPF: ABNORMAL /HPF
UROBILINOGEN UR QL STRIP: ABNORMAL
WBC NRBC COR # BLD: 14.22 10*3/MM3 (ref 4.5–10.7)
WBC UR QL AUTO: ABNORMAL /HPF
WHOLE BLOOD HOLD SPECIMEN: NORMAL
WHOLE BLOOD HOLD SPECIMEN: NORMAL

## 2017-05-28 PROCEDURE — 85025 COMPLETE CBC W/AUTO DIFF WBC: CPT | Performed by: EMERGENCY MEDICINE

## 2017-05-28 PROCEDURE — 36415 COLL VENOUS BLD VENIPUNCTURE: CPT

## 2017-05-28 PROCEDURE — 87086 URINE CULTURE/COLONY COUNT: CPT | Performed by: EMERGENCY MEDICINE

## 2017-05-28 PROCEDURE — 83690 ASSAY OF LIPASE: CPT | Performed by: EMERGENCY MEDICINE

## 2017-05-28 PROCEDURE — 87186 SC STD MICRODIL/AGAR DIL: CPT | Performed by: EMERGENCY MEDICINE

## 2017-05-28 PROCEDURE — 99284 EMERGENCY DEPT VISIT MOD MDM: CPT

## 2017-05-28 PROCEDURE — 80053 COMPREHEN METABOLIC PANEL: CPT | Performed by: EMERGENCY MEDICINE

## 2017-05-28 PROCEDURE — 81001 URINALYSIS AUTO W/SCOPE: CPT | Performed by: EMERGENCY MEDICINE

## 2017-05-28 PROCEDURE — 83605 ASSAY OF LACTIC ACID: CPT | Performed by: EMERGENCY MEDICINE

## 2017-05-28 PROCEDURE — 74176 CT ABD & PELVIS W/O CONTRAST: CPT

## 2017-05-28 RX ORDER — SODIUM CHLORIDE 0.9 % (FLUSH) 0.9 %
10 SYRINGE (ML) INJECTION AS NEEDED
Status: DISCONTINUED | OUTPATIENT
Start: 2017-05-28 | End: 2017-05-30

## 2017-05-28 RX ADMIN — SODIUM CHLORIDE 500 ML: 9 INJECTION, SOLUTION INTRAVENOUS at 19:40

## 2017-05-28 RX ADMIN — METRONIDAZOLE 500 MG: 500 INJECTION, SOLUTION INTRAVENOUS at 22:51

## 2017-05-29 PROBLEM — I25.10 CORONARY ARTERY DISEASE: Status: ACTIVE | Noted: 2017-05-29

## 2017-05-29 PROBLEM — N39.0 UTI (URINARY TRACT INFECTION): Status: ACTIVE | Noted: 2017-05-29

## 2017-05-29 PROBLEM — R82.90 ABNORMAL FINDING IN URINE: Status: ACTIVE | Noted: 2017-05-29

## 2017-05-29 PROBLEM — K62.5 RECTAL BLEEDING: Status: ACTIVE | Noted: 2017-05-29

## 2017-05-29 LAB
ANION GAP SERPL CALCULATED.3IONS-SCNC: 10.1 MMOL/L
BUN BLD-MCNC: 18 MG/DL (ref 8–23)
BUN/CREAT SERPL: 17.1 (ref 7–25)
CALCIUM SPEC-SCNC: 8.6 MG/DL (ref 8.2–9.6)
CHLORIDE SERPL-SCNC: 102 MMOL/L (ref 98–107)
CO2 SERPL-SCNC: 23.9 MMOL/L (ref 22–29)
CREAT BLD-MCNC: 1.05 MG/DL (ref 0.57–1)
DEPRECATED RDW RBC AUTO: 48.4 FL (ref 37–54)
ERYTHROCYTE [DISTWIDTH] IN BLOOD BY AUTOMATED COUNT: 15.1 % (ref 11.7–13)
GFR SERPL CREATININE-BSD FRML MDRD: 49 ML/MIN/1.73
GLUCOSE BLD-MCNC: 95 MG/DL (ref 65–99)
HCT VFR BLD AUTO: 36.5 % (ref 35.6–45.5)
HGB BLD-MCNC: 12.2 G/DL (ref 11.9–15.5)
MCH RBC QN AUTO: 29 PG (ref 26.9–32)
MCHC RBC AUTO-ENTMCNC: 33.4 G/DL (ref 32.4–36.3)
MCV RBC AUTO: 86.9 FL (ref 80.5–98.2)
PLATELET # BLD AUTO: 179 10*3/MM3 (ref 140–500)
PMV BLD AUTO: 10.9 FL (ref 6–12)
POTASSIUM BLD-SCNC: 3.9 MMOL/L (ref 3.5–5.2)
RBC # BLD AUTO: 4.2 10*6/MM3 (ref 3.9–5.2)
SODIUM BLD-SCNC: 136 MMOL/L (ref 136–145)
WBC NRBC COR # BLD: 6.94 10*3/MM3 (ref 4.5–10.7)

## 2017-05-29 PROCEDURE — 97110 THERAPEUTIC EXERCISES: CPT | Performed by: PHYSICAL THERAPIST

## 2017-05-29 PROCEDURE — 80048 BASIC METABOLIC PNL TOTAL CA: CPT | Performed by: INTERNAL MEDICINE

## 2017-05-29 PROCEDURE — 85027 COMPLETE CBC AUTOMATED: CPT | Performed by: INTERNAL MEDICINE

## 2017-05-29 PROCEDURE — 97161 PT EVAL LOW COMPLEX 20 MIN: CPT | Performed by: PHYSICAL THERAPIST

## 2017-05-29 RX ORDER — ACETAMINOPHEN 325 MG/1
650 TABLET ORAL EVERY 6 HOURS PRN
Status: DISCONTINUED | OUTPATIENT
Start: 2017-05-29 | End: 2017-05-30 | Stop reason: HOSPADM

## 2017-05-29 RX ORDER — ONDANSETRON 2 MG/ML
4 INJECTION INTRAMUSCULAR; INTRAVENOUS EVERY 6 HOURS PRN
Status: DISCONTINUED | OUTPATIENT
Start: 2017-05-29 | End: 2017-05-30 | Stop reason: HOSPADM

## 2017-05-29 RX ORDER — SODIUM CHLORIDE 9 MG/ML
75 INJECTION, SOLUTION INTRAVENOUS CONTINUOUS
Status: ACTIVE | OUTPATIENT
Start: 2017-05-29 | End: 2017-05-29

## 2017-05-29 RX ORDER — CLONAZEPAM 0.5 MG/1
0.5 TABLET ORAL DAILY PRN
Status: DISCONTINUED | OUTPATIENT
Start: 2017-05-29 | End: 2017-05-30 | Stop reason: HOSPADM

## 2017-05-29 RX ORDER — ACETAMINOPHEN 325 MG/1
650 TABLET ORAL EVERY 4 HOURS PRN
Status: DISCONTINUED | OUTPATIENT
Start: 2017-05-29 | End: 2017-05-29

## 2017-05-29 RX ORDER — METRONIDAZOLE 500 MG/1
500 TABLET ORAL EVERY 8 HOURS SCHEDULED
Status: DISCONTINUED | OUTPATIENT
Start: 2017-05-29 | End: 2017-05-30 | Stop reason: HOSPADM

## 2017-05-29 RX ORDER — CARVEDILOL 3.12 MG/1
3.12 TABLET ORAL 2 TIMES DAILY WITH MEALS
Status: DISCONTINUED | OUTPATIENT
Start: 2017-05-29 | End: 2017-05-30 | Stop reason: HOSPADM

## 2017-05-29 RX ORDER — ONDANSETRON 4 MG/1
4 TABLET, ORALLY DISINTEGRATING ORAL EVERY 6 HOURS PRN
Status: DISCONTINUED | OUTPATIENT
Start: 2017-05-29 | End: 2017-05-30 | Stop reason: HOSPADM

## 2017-05-29 RX ORDER — SODIUM CHLORIDE 0.9 % (FLUSH) 0.9 %
1-10 SYRINGE (ML) INJECTION AS NEEDED
Status: DISCONTINUED | OUTPATIENT
Start: 2017-05-29 | End: 2017-05-30 | Stop reason: HOSPADM

## 2017-05-29 RX ORDER — ONDANSETRON 4 MG/1
4 TABLET, FILM COATED ORAL EVERY 6 HOURS PRN
Status: DISCONTINUED | OUTPATIENT
Start: 2017-05-29 | End: 2017-05-30 | Stop reason: HOSPADM

## 2017-05-29 RX ADMIN — CARVEDILOL 3.12 MG: 3.12 TABLET, FILM COATED ORAL at 21:10

## 2017-05-29 RX ADMIN — METRONIDAZOLE 500 MG: 500 TABLET, FILM COATED ORAL at 14:34

## 2017-05-29 RX ADMIN — METRONIDAZOLE 500 MG: 500 TABLET, FILM COATED ORAL at 08:07

## 2017-05-29 RX ADMIN — METRONIDAZOLE 500 MG: 500 TABLET, FILM COATED ORAL at 21:10

## 2017-05-30 VITALS
DIASTOLIC BLOOD PRESSURE: 71 MMHG | BODY MASS INDEX: 25.93 KG/M2 | SYSTOLIC BLOOD PRESSURE: 129 MMHG | HEART RATE: 67 BPM | HEIGHT: 64 IN | TEMPERATURE: 97.7 F | OXYGEN SATURATION: 95 % | WEIGHT: 151.9 LBS | RESPIRATION RATE: 20 BRPM

## 2017-05-30 PROBLEM — K62.5 RECTAL BLEEDING: Status: RESOLVED | Noted: 2017-05-29 | Resolved: 2017-05-30

## 2017-05-30 PROBLEM — R82.90 ABNORMAL FINDING IN URINE: Status: RESOLVED | Noted: 2017-05-29 | Resolved: 2017-05-30

## 2017-05-30 PROBLEM — K52.9 COLITIS PRESUMED INFECTIOUS: Status: RESOLVED | Noted: 2017-05-28 | Resolved: 2017-05-30

## 2017-05-30 LAB
ANION GAP SERPL CALCULATED.3IONS-SCNC: 12.6 MMOL/L
BACTERIA SPEC AEROBE CULT: ABNORMAL
BUN BLD-MCNC: 16 MG/DL (ref 8–23)
BUN/CREAT SERPL: 15.4 (ref 7–25)
CALCIUM SPEC-SCNC: 8.6 MG/DL (ref 8.2–9.6)
CHLORIDE SERPL-SCNC: 108 MMOL/L (ref 98–107)
CO2 SERPL-SCNC: 21.4 MMOL/L (ref 22–29)
CREAT BLD-MCNC: 1.04 MG/DL (ref 0.57–1)
GFR SERPL CREATININE-BSD FRML MDRD: 49 ML/MIN/1.73
GLUCOSE BLD-MCNC: 97 MG/DL (ref 65–99)
POTASSIUM BLD-SCNC: 3.9 MMOL/L (ref 3.5–5.2)
PROCALCITONIN SERPL-MCNC: 0.07 NG/ML (ref 0.1–0.25)
SODIUM BLD-SCNC: 142 MMOL/L (ref 136–145)

## 2017-05-30 PROCEDURE — 84145 PROCALCITONIN (PCT): CPT | Performed by: HOSPITALIST

## 2017-05-30 PROCEDURE — 90791 PSYCH DIAGNOSTIC EVALUATION: CPT

## 2017-05-30 PROCEDURE — 80048 BASIC METABOLIC PNL TOTAL CA: CPT | Performed by: HOSPITALIST

## 2017-05-30 RX ORDER — METRONIDAZOLE 500 MG/1
500 TABLET ORAL EVERY 8 HOURS SCHEDULED
Qty: 21 TABLET | Refills: 0 | Status: SHIPPED | OUTPATIENT
Start: 2017-05-30 | End: 2017-06-06

## 2017-05-30 RX ADMIN — METRONIDAZOLE 500 MG: 500 TABLET, FILM COATED ORAL at 14:04

## 2017-05-30 RX ADMIN — CARVEDILOL 3.12 MG: 3.12 TABLET, FILM COATED ORAL at 09:27

## 2017-05-30 RX ADMIN — METRONIDAZOLE 500 MG: 500 TABLET, FILM COATED ORAL at 06:33

## 2017-05-30 RX ADMIN — CLONAZEPAM 0.5 MG: 0.5 TABLET ORAL at 09:32

## 2017-06-04 ENCOUNTER — APPOINTMENT (OUTPATIENT)
Dept: GENERAL RADIOLOGY | Facility: HOSPITAL | Age: 82
End: 2017-06-04

## 2017-06-04 ENCOUNTER — HOSPITAL ENCOUNTER (EMERGENCY)
Facility: HOSPITAL | Age: 82
Discharge: HOME | End: 2017-06-04
Attending: EMERGENCY MEDICINE | Admitting: EMERGENCY MEDICINE

## 2017-06-04 VITALS
WEIGHT: 147 LBS | RESPIRATION RATE: 18 BRPM | TEMPERATURE: 99.4 F | DIASTOLIC BLOOD PRESSURE: 70 MMHG | BODY MASS INDEX: 25.1 KG/M2 | HEART RATE: 74 BPM | HEIGHT: 64 IN | OXYGEN SATURATION: 98 % | SYSTOLIC BLOOD PRESSURE: 165 MMHG

## 2017-06-04 DIAGNOSIS — R60.9 PERIPHERAL EDEMA: Primary | ICD-10-CM

## 2017-06-04 LAB
ALBUMIN SERPL-MCNC: 3.9 G/DL (ref 3.5–5.2)
ALBUMIN/GLOB SERPL: 1.2 G/DL
ALP SERPL-CCNC: 49 U/L (ref 39–117)
ALT SERPL W P-5'-P-CCNC: 20 U/L (ref 1–33)
ANION GAP SERPL CALCULATED.3IONS-SCNC: 14.9 MMOL/L
AST SERPL-CCNC: 34 U/L (ref 1–32)
BASOPHILS # BLD AUTO: 0.03 10*3/MM3 (ref 0–0.2)
BASOPHILS NFR BLD AUTO: 0.4 % (ref 0–1.5)
BILIRUB SERPL-MCNC: 0.3 MG/DL (ref 0.1–1.2)
BUN BLD-MCNC: 19 MG/DL (ref 8–23)
BUN/CREAT SERPL: 15.7 (ref 7–25)
CALCIUM SPEC-SCNC: 9.7 MG/DL (ref 8.2–9.6)
CHLORIDE SERPL-SCNC: 97 MMOL/L (ref 98–107)
CO2 SERPL-SCNC: 22.1 MMOL/L (ref 22–29)
CREAT BLD-MCNC: 1.21 MG/DL (ref 0.57–1)
DEPRECATED RDW RBC AUTO: 49.8 FL (ref 37–54)
EOSINOPHIL # BLD AUTO: 0.14 10*3/MM3 (ref 0–0.7)
EOSINOPHIL NFR BLD AUTO: 2 % (ref 0.3–6.2)
ERYTHROCYTE [DISTWIDTH] IN BLOOD BY AUTOMATED COUNT: 15.4 % (ref 11.7–13)
GFR SERPL CREATININE-BSD FRML MDRD: 41 ML/MIN/1.73
GLOBULIN UR ELPH-MCNC: 3.2 GM/DL
GLUCOSE BLD-MCNC: 101 MG/DL (ref 65–99)
HCT VFR BLD AUTO: 41 % (ref 35.6–45.5)
HGB BLD-MCNC: 13.6 G/DL (ref 11.9–15.5)
HOLD SPECIMEN: NORMAL
IMM GRANULOCYTES # BLD: 0 10*3/MM3 (ref 0–0.03)
IMM GRANULOCYTES NFR BLD: 0 % (ref 0–0.5)
LYMPHOCYTES # BLD AUTO: 1.67 10*3/MM3 (ref 0.9–4.8)
LYMPHOCYTES NFR BLD AUTO: 23.7 % (ref 19.6–45.3)
MCH RBC QN AUTO: 29.6 PG (ref 26.9–32)
MCHC RBC AUTO-ENTMCNC: 33.2 G/DL (ref 32.4–36.3)
MCV RBC AUTO: 89.3 FL (ref 80.5–98.2)
MONOCYTES # BLD AUTO: 0.71 10*3/MM3 (ref 0.2–1.2)
MONOCYTES NFR BLD AUTO: 10.1 % (ref 5–12)
NEUTROPHILS # BLD AUTO: 4.5 10*3/MM3 (ref 1.9–8.1)
NEUTROPHILS NFR BLD AUTO: 63.8 % (ref 42.7–76)
NT-PROBNP SERPL-MCNC: 609.2 PG/ML (ref 0–1800)
PLATELET # BLD AUTO: 218 10*3/MM3 (ref 140–500)
PMV BLD AUTO: 11.3 FL (ref 6–12)
POTASSIUM BLD-SCNC: 4.5 MMOL/L (ref 3.5–5.2)
PROT SERPL-MCNC: 7.1 G/DL (ref 6–8.5)
RBC # BLD AUTO: 4.59 10*6/MM3 (ref 3.9–5.2)
SODIUM BLD-SCNC: 134 MMOL/L (ref 136–145)
TROPONIN T SERPL-MCNC: <0.01 NG/ML (ref 0–0.03)
WBC NRBC COR # BLD: 7.05 10*3/MM3 (ref 4.5–10.7)
WHOLE BLOOD HOLD SPECIMEN: NORMAL

## 2017-06-04 PROCEDURE — 99283 EMERGENCY DEPT VISIT LOW MDM: CPT

## 2017-06-04 PROCEDURE — 36415 COLL VENOUS BLD VENIPUNCTURE: CPT | Performed by: EMERGENCY MEDICINE

## 2017-06-04 PROCEDURE — 93010 ELECTROCARDIOGRAM REPORT: CPT | Performed by: INTERNAL MEDICINE

## 2017-06-04 PROCEDURE — 85025 COMPLETE CBC W/AUTO DIFF WBC: CPT | Performed by: EMERGENCY MEDICINE

## 2017-06-04 PROCEDURE — 83880 ASSAY OF NATRIURETIC PEPTIDE: CPT | Performed by: EMERGENCY MEDICINE

## 2017-06-04 PROCEDURE — 71020 HC CHEST PA AND LATERAL: CPT

## 2017-06-04 PROCEDURE — 93005 ELECTROCARDIOGRAM TRACING: CPT | Performed by: EMERGENCY MEDICINE

## 2017-06-04 PROCEDURE — 84484 ASSAY OF TROPONIN QUANT: CPT | Performed by: EMERGENCY MEDICINE

## 2017-06-04 PROCEDURE — 80053 COMPREHEN METABOLIC PANEL: CPT | Performed by: EMERGENCY MEDICINE

## 2017-06-04 RX ORDER — SODIUM CHLORIDE 0.9 % (FLUSH) 0.9 %
10 SYRINGE (ML) INJECTION AS NEEDED
Status: DISCONTINUED | OUTPATIENT
Start: 2017-06-04 | End: 2017-06-04 | Stop reason: HOSPADM

## 2017-06-04 NOTE — ED PROVIDER NOTES
" EMERGENCY DEPARTMENT ENCOUNTER    CHIEF COMPLAINT  Chief Complaint: Lower Extremity Edema  History given by: Patient, Friend  History limited by: N/A  Room Number: 22/22  PMD: Waqar Melissa MD      HPI:  Pt with h/o CHF presents to the ED c/o BLE edema onset several days ago. Pt denies dyspnea, CP, abd pain, and N/V/D. Pt reports that she was admitted 05/28/17-05/30/17 secondary to colitis. Pt was prescribed with rx for Flagyl upon discharge. Pt states that she is on Lasix 20 mg for her CHF, which she takes every other day. There are no other complaints at this time.     Location: Bilateral lower extremities  Radiation: None  Quality: \"swelling\"  Intensity/Severity: Moderate  Duration: Onset several days ago  Onset quality: Gradual  Timing: Constant  Progression: Unchanged  Aggravating Factors: Nothing  Alleviating Factors: Nothing  Previous Episodes: Multiple; pt has h/o CHF  Treatment before arrival: None  Associated Symptoms: None      PAST MEDICAL HISTORY  Active Ambulatory Problems     Diagnosis Date Noted   • Abdominal pain, vomiting, and diarrhea 03/16/2016   • Chronic constipation 03/16/2016   • Hemorrhoid 03/16/2016   • BP (high blood pressure) 03/16/2016   • Arthralgia of hip 03/16/2016   • LBP (low back pain) 03/16/2016   • Disease of thyroid gland 03/16/2016   • Benign essential HTN 05/28/2017   • Chronic kidney disease, stage III (moderate) 05/28/2017   • Debility 05/28/2017   • Chronic systolic CHF (congestive heart failure) 05/28/2017   • Coronary artery disease 05/29/2017     Resolved Ambulatory Problems     Diagnosis Date Noted   • Colitis presumed infectious (bacterial) 05/28/2017   • Rectal bleeding 05/29/2017   • Abnormal finding in urine 05/29/2017     Past Medical History:   Diagnosis Date   • Arthritis    • CHF (congestive heart failure)    • Coronary artery disease    • Disease of thyroid gland 3/16/2016   • Diverticulitis    • Hypertension          PAST SURGICAL HISTORY  Past " Surgical History:   Procedure Laterality Date   • ABDOMINAL SURGERY      liban   • APPENDECTOMY     • COLON SURGERY      fistulectomy   • EYE SURGERY      cataract   • HYSTERECTOMY     • TONSILLECTOMY           FAMILY HISTORY  Family History   Problem Relation Age of Onset   • Hypertension Other          SOCIAL HISTORY  Social History     Social History   • Marital status: Single     Spouse name: N/A   • Number of children: N/A   • Years of education: N/A     Occupational History   • Not on file.     Social History Main Topics   • Smoking status: Never Smoker   • Smokeless tobacco: Not on file   • Alcohol use Not on file   • Drug use: Not on file   • Sexual activity: Not on file     Other Topics Concern   • Not on file     Social History Narrative         ALLERGIES  Amoxicillin; Atorvastatin; Diazepam; Levoxyl  [levothyroxine sodium]; Lexapro  [escitalopram oxalate]; Lopressor  [metoprolol tartrate]; Metaxalone; Omeprazole; and Penicillins        REVIEW OF SYSTEMS  Review of Systems   Constitutional: Negative for chills.   HENT: Negative for congestion, rhinorrhea and sore throat.    Eyes: Negative for pain.   Respiratory: Negative for cough and shortness of breath.    Cardiovascular: Positive for leg swelling (BLE edema). Negative for chest pain and palpitations.   Gastrointestinal: Negative for abdominal pain, diarrhea, nausea and vomiting.   Genitourinary: Negative for difficulty urinating, dysuria, flank pain and frequency.   Musculoskeletal: Negative for myalgias, neck pain and neck stiffness.   Skin: Negative for rash.   Neurological: Negative for dizziness, speech difficulty, weakness, light-headedness, numbness and headaches.   Psychiatric/Behavioral: Negative.    All other systems reviewed and are negative.           PHYSICAL EXAM  ED Triage Vitals   Temp Heart Rate Resp BP SpO2   06/04/17 1542 06/04/17 1542 06/04/17 1542 06/04/17 1542 06/04/17 1542   99.4 °F (37.4 °C) 74 18 165/70 98 % WNL      Temp src  Heart Rate Source Patient Position BP Location FiO2 (%)   06/04/17 1542 -- -- -- --   Tympanic           Physical Exam   Constitutional: She is oriented to person, place, and time. No distress.   HENT:   Head: Normocephalic.   Mouth/Throat: Mucous membranes are normal.   Eyes: EOM are normal. Pupils are equal, round, and reactive to light.   Neck: Normal range of motion. Neck supple.   Cardiovascular: Normal rate, regular rhythm and normal heart sounds.    Pulmonary/Chest: Effort normal and breath sounds normal. No respiratory distress. She has no decreased breath sounds. She has no wheezes. She has no rhonchi. She has no rales.   Abdominal: Soft. There is no tenderness. There is no rebound and no guarding.   Musculoskeletal: Normal range of motion. She exhibits edema (1+ BLE edema).   Neurological: She is alert and oriented to person, place, and time. She has normal sensation.   Skin: Skin is warm and dry.   Psychiatric: Mood and affect normal.   Nursing note and vitals reviewed.          LAB RESULTS  Recent Results (from the past 24 hour(s))   Comprehensive Metabolic Panel    Collection Time: 06/04/17  3:51 PM   Result Value Ref Range    Glucose 101 (H) 65 - 99 mg/dL    BUN 19 8 - 23 mg/dL    Creatinine 1.21 (H) 0.57 - 1.00 mg/dL    Sodium 134 (L) 136 - 145 mmol/L    Potassium 4.5 3.5 - 5.2 mmol/L    Chloride 97 (L) 98 - 107 mmol/L    CO2 22.1 22.0 - 29.0 mmol/L    Calcium 9.7 (H) 8.2 - 9.6 mg/dL    Total Protein 7.1 6.0 - 8.5 g/dL    Albumin 3.90 3.50 - 5.20 g/dL    ALT (SGPT) 20 1 - 33 U/L    AST (SGOT) 34 (H) 1 - 32 U/L    Alkaline Phosphatase 49 39 - 117 U/L    Total Bilirubin 0.3 0.1 - 1.2 mg/dL    eGFR Non African Amer 41 (L) >60 mL/min/1.73    Globulin 3.2 gm/dL    A/G Ratio 1.2 g/dL    BUN/Creatinine Ratio 15.7 7.0 - 25.0    Anion Gap 14.9 mmol/L   BNP    Collection Time: 06/04/17  3:51 PM   Result Value Ref Range    proBNP 609.2 0.0 - 1800.0 pg/mL   Troponin    Collection Time: 06/04/17  3:51 PM   Result  Value Ref Range    Troponin T <0.010 0.000 - 0.030 ng/mL   Light Blue Top    Collection Time: 06/04/17  3:51 PM   Result Value Ref Range    Extra Tube hold for add-on    Gold Top - SST    Collection Time: 06/04/17  3:51 PM   Result Value Ref Range    Extra Tube Hold for add-ons.    CBC Auto Differential    Collection Time: 06/04/17  3:51 PM   Result Value Ref Range    WBC 7.05 4.50 - 10.70 10*3/mm3    RBC 4.59 3.90 - 5.20 10*6/mm3    Hemoglobin 13.6 11.9 - 15.5 g/dL    Hematocrit 41.0 35.6 - 45.5 %    MCV 89.3 80.5 - 98.2 fL    MCH 29.6 26.9 - 32.0 pg    MCHC 33.2 32.4 - 36.3 g/dL    RDW 15.4 (H) 11.7 - 13.0 %    RDW-SD 49.8 37.0 - 54.0 fl    MPV 11.3 6.0 - 12.0 fL    Platelets 218 140 - 500 10*3/mm3    Neutrophil % 63.8 42.7 - 76.0 %    Lymphocyte % 23.7 19.6 - 45.3 %    Monocyte % 10.1 5.0 - 12.0 %    Eosinophil % 2.0 0.3 - 6.2 %    Basophil % 0.4 0.0 - 1.5 %    Immature Grans % 0.0 0.0 - 0.5 %    Neutrophils, Absolute 4.50 1.90 - 8.10 10*3/mm3    Lymphocytes, Absolute 1.67 0.90 - 4.80 10*3/mm3    Monocytes, Absolute 0.71 0.20 - 1.20 10*3/mm3    Eosinophils, Absolute 0.14 0.00 - 0.70 10*3/mm3    Basophils, Absolute 0.03 0.00 - 0.20 10*3/mm3    Immature Grans, Absolute 0.00 0.00 - 0.03 10*3/mm3       Ordered the above labs and reviewed the results.            RADIOLOGY  XR Chest 2 View   Two views of the chest demonstrates the heart to be within normal limits  in size. There is no evidence of focal infiltrate, effusion or of  congestive failure. There is mild atelectasis at the left lung base. A  calcified granuloma involving the right upper lobe is noted. There is  eventration of the left hemidiaphragm. Interpreted by radiologist. Independently viewed by me.                Ordered the above noted radiological studies. Reviewed by me in PACS.       PROCEDURES  Procedures      EKG:           EKG time: 19:01   Rhythm/Rate: NSR rate 69  P waves and NH: Normal P waves  QRS, axis: LAD   ST and T waves: Normal ST      Interpreted Contemporaneously by me, independently viewed  Unchanged compared to prior             PROGRESS AND CONSULTS  ED Course   Comment By Time   7:48 PM  Patient here for leg swelling.  Most likely from some increased volume while in the hospital.  Uses her lasix every other day.  Will increase lasix to every day for a week.  Needs to follow up with PMD.  Has been seen by CCP.  She has home health that she has fired.  Has numbers to call if she needs them. Zackery Zabala MD 06/04 1950     6:40 PM: Informed pt that her troponin is negative. BNP is 609.2. CXR does not suggest that pt is in CHF. Pt advised to take her Lasix daily rather than every other day. Pt also advised to f/u with PMD. RTER warnings given. Pt agrees with plan for discharge.    Pt is concerned that she may need further assistance at home since she lives alone. CCP was notified and will discuss options with pt.     7:44 PM: CCP has provided pt with options for home health care. Pt states that she has options for home health care and will consult them as needed.        MEDICAL DECISION MAKING      MDM  Number of Diagnoses or Management Options  Peripheral edema:      Amount and/or Complexity of Data Reviewed  Clinical lab tests: ordered and reviewed (BNP is 609.2. Troponin is negative.)  Tests in the radiology section of CPT®: ordered and reviewed (CXR: Two views of the chest demonstrates the heart to be within normal limits in size. There is no evidence of focal infiltrate, effusion or of congestive failure. There is mild atelectasis at the left lung base. A calcified granuloma involving the right upper lobe is noted. There is  eventration of the left hemidiaphragm.)  Tests in the medicine section of CPT®: ordered and reviewed (EKG interpreted.   )  Decide to obtain previous medical records or to obtain history from someone other than the patient: yes  Review and summarize past medical records: yes (Pt was admitted 05/28/17-05/30/17  secondary to colitis. )    Patient Progress  Patient progress: stable             DIAGNOSIS  Final diagnoses:   Peripheral edema         DISPOSITION  Pt discharged.      DISCHARGE    Patient discharged in stable condition.    Reviewed implications of results, diagnosis, meds, responsibility to follow up, warning signs and symptoms of possible worsening, potential complications and reasons to return to ER.    Patient/Family voiced understanding of above instructions.    Discussed plan for discharge, as there is no emergent indication for admission.  Pt/family is agreeable and understands need for follow up and repeat testing.  Pt is aware that discharge does not mean that nothing is wrong but it indicates no emergency is present that requires admission and they must continue care with follow-up as given below or physician of their choice.     FOLLOW-UP  Waqar Melissa MD  [address] [facility] [address]  [place] [address]  [phone]    Schedule an appointment as soon as possible for a visit                  Latest Documented Vital Signs:  As of 7:54 PM  BP- 165/70 HR- 74 Temp- 99.4 °F (37.4 °C) (Tympanic) O2 sat- 98%        --  Documentation assistance provided by henrique Johnson for Dr. Vj MD.  Information recorded by the scribe was done at my direction and has been verified and validated by me.              Dav Johnson  06/04/17 2001       Zackery Zabala MD  06/04/17 [id number]

## 2017-06-09 ENCOUNTER — APPOINTMENT (OUTPATIENT)
Dept: GENERAL RADIOLOGY | Facility: HOSPITAL | Age: 82
End: 2017-06-09

## 2017-06-09 ENCOUNTER — HOSPITAL ENCOUNTER (EMERGENCY)
Facility: HOSPITAL | Age: 82
Discharge: HOME | End: 2017-06-10
Attending: EMERGENCY MEDICINE | Admitting: EMERGENCY MEDICINE

## 2017-06-09 DIAGNOSIS — A49.9 UTI (URINARY TRACT INFECTION), BACTERIAL: Primary | ICD-10-CM

## 2017-06-09 DIAGNOSIS — R53.1 GENERALIZED WEAKNESS: ICD-10-CM

## 2017-06-09 DIAGNOSIS — M54.50 ACUTE MIDLINE LOW BACK PAIN WITHOUT SCIATICA: ICD-10-CM

## 2017-06-09 DIAGNOSIS — N39.0 UTI (URINARY TRACT INFECTION), BACTERIAL: Primary | ICD-10-CM

## 2017-06-09 LAB
ALBUMIN SERPL-MCNC: 3.9 G/DL (ref 3.5–5.2)
ALBUMIN/GLOB SERPL: 1.3 G/DL
ALP SERPL-CCNC: 44 U/L (ref 39–117)
ALT SERPL W P-5'-P-CCNC: 16 U/L (ref 1–33)
ANION GAP SERPL CALCULATED.3IONS-SCNC: 12.6 MMOL/L
AST SERPL-CCNC: 24 U/L (ref 1–32)
BACTERIA UR QL AUTO: ABNORMAL /HPF
BASOPHILS # BLD AUTO: 0.04 10*3/MM3 (ref 0–0.2)
BASOPHILS NFR BLD AUTO: 0.5 % (ref 0–1.5)
BILIRUB SERPL-MCNC: 0.3 MG/DL (ref 0.1–1.2)
BILIRUB UR QL STRIP: NEGATIVE
BUN BLD-MCNC: 19 MG/DL (ref 8–23)
BUN/CREAT SERPL: 17 (ref 7–25)
CALCIUM SPEC-SCNC: 9.2 MG/DL (ref 8.2–9.6)
CHLORIDE SERPL-SCNC: 95 MMOL/L (ref 98–107)
CLARITY UR: ABNORMAL
CO2 SERPL-SCNC: 24.4 MMOL/L (ref 22–29)
COLOR UR: YELLOW
CREAT BLD-MCNC: 1.12 MG/DL (ref 0.57–1)
DEPRECATED RDW RBC AUTO: 48.5 FL (ref 37–54)
EOSINOPHIL # BLD AUTO: 0.18 10*3/MM3 (ref 0–0.7)
EOSINOPHIL NFR BLD AUTO: 2.4 % (ref 0.3–6.2)
ERYTHROCYTE [DISTWIDTH] IN BLOOD BY AUTOMATED COUNT: 15 % (ref 11.7–13)
GFR SERPL CREATININE-BSD FRML MDRD: 45 ML/MIN/1.73
GLOBULIN UR ELPH-MCNC: 3.1 GM/DL
GLUCOSE BLD-MCNC: 107 MG/DL (ref 65–99)
GLUCOSE UR STRIP-MCNC: NEGATIVE MG/DL
HCT VFR BLD AUTO: 40.7 % (ref 35.6–45.5)
HGB BLD-MCNC: 13.6 G/DL (ref 11.9–15.5)
HGB UR QL STRIP.AUTO: NEGATIVE
HYALINE CASTS UR QL AUTO: ABNORMAL /LPF
IMM GRANULOCYTES # BLD: 0 10*3/MM3 (ref 0–0.03)
IMM GRANULOCYTES NFR BLD: 0 % (ref 0–0.5)
KETONES UR QL STRIP: NEGATIVE
LEUKOCYTE ESTERASE UR QL STRIP.AUTO: ABNORMAL
LYMPHOCYTES # BLD AUTO: 1.79 10*3/MM3 (ref 0.9–4.8)
LYMPHOCYTES NFR BLD AUTO: 24.3 % (ref 19.6–45.3)
MCH RBC QN AUTO: 29.5 PG (ref 26.9–32)
MCHC RBC AUTO-ENTMCNC: 33.4 G/DL (ref 32.4–36.3)
MCV RBC AUTO: 88.3 FL (ref 80.5–98.2)
MONOCYTES # BLD AUTO: 0.72 10*3/MM3 (ref 0.2–1.2)
MONOCYTES NFR BLD AUTO: 9.8 % (ref 5–12)
NEUTROPHILS # BLD AUTO: 4.63 10*3/MM3 (ref 1.9–8.1)
NEUTROPHILS NFR BLD AUTO: 63 % (ref 42.7–76)
NITRITE UR QL STRIP: POSITIVE
PH UR STRIP.AUTO: 7 [PH] (ref 5–8)
PLATELET # BLD AUTO: 221 10*3/MM3 (ref 140–500)
PMV BLD AUTO: 11.2 FL (ref 6–12)
POTASSIUM BLD-SCNC: 4.5 MMOL/L (ref 3.5–5.2)
PROT SERPL-MCNC: 7 G/DL (ref 6–8.5)
PROT UR QL STRIP: NEGATIVE
RBC # BLD AUTO: 4.61 10*6/MM3 (ref 3.9–5.2)
RBC # UR: ABNORMAL /HPF
REF LAB TEST METHOD: ABNORMAL
SODIUM BLD-SCNC: 132 MMOL/L (ref 136–145)
SP GR UR STRIP: 1.01 (ref 1–1.03)
SQUAMOUS #/AREA URNS HPF: ABNORMAL /HPF
UROBILINOGEN UR QL STRIP: ABNORMAL
WBC NRBC COR # BLD: 7.36 10*3/MM3 (ref 4.5–10.7)
WBC UR QL AUTO: ABNORMAL /HPF

## 2017-06-09 PROCEDURE — 80053 COMPREHEN METABOLIC PANEL: CPT | Performed by: EMERGENCY MEDICINE

## 2017-06-09 PROCEDURE — 81001 URINALYSIS AUTO W/SCOPE: CPT | Performed by: EMERGENCY MEDICINE

## 2017-06-09 PROCEDURE — 99284 EMERGENCY DEPT VISIT MOD MDM: CPT

## 2017-06-09 PROCEDURE — 72110 X-RAY EXAM L-2 SPINE 4/>VWS: CPT

## 2017-06-09 PROCEDURE — 87186 SC STD MICRODIL/AGAR DIL: CPT | Performed by: EMERGENCY MEDICINE

## 2017-06-09 PROCEDURE — 85025 COMPLETE CBC W/AUTO DIFF WBC: CPT | Performed by: EMERGENCY MEDICINE

## 2017-06-09 PROCEDURE — 87086 URINE CULTURE/COLONY COUNT: CPT | Performed by: EMERGENCY MEDICINE

## 2017-06-09 PROCEDURE — 87077 CULTURE AEROBIC IDENTIFY: CPT | Performed by: EMERGENCY MEDICINE

## 2017-06-09 PROCEDURE — 36415 COLL VENOUS BLD VENIPUNCTURE: CPT | Performed by: EMERGENCY MEDICINE

## 2017-06-10 VITALS
SYSTOLIC BLOOD PRESSURE: 159 MMHG | RESPIRATION RATE: 16 BRPM | HEART RATE: 68 BPM | OXYGEN SATURATION: 94 % | DIASTOLIC BLOOD PRESSURE: 70 MMHG | WEIGHT: 147 LBS | TEMPERATURE: 97.2 F | HEIGHT: 64 IN | BODY MASS INDEX: 25.1 KG/M2

## 2017-06-10 PROCEDURE — 25010000003 CEFTRIAXONE PER 250 MG: Performed by: EMERGENCY MEDICINE

## 2017-06-10 PROCEDURE — 96365 THER/PROPH/DIAG IV INF INIT: CPT

## 2017-06-10 RX ORDER — CEFTRIAXONE SODIUM 1 G/50ML
1 INJECTION, SOLUTION INTRAVENOUS ONCE
Status: COMPLETED | OUTPATIENT
Start: 2017-06-10 | End: 2017-06-10

## 2017-06-10 RX ORDER — SULFAMETHOXAZOLE AND TRIMETHOPRIM 800; 160 MG/1; MG/1
1 TABLET ORAL 2 TIMES DAILY
Qty: 10 TABLET | Refills: 0 | Status: SHIPPED | OUTPATIENT
Start: 2017-06-10 | End: 2018-05-29

## 2017-06-10 RX ADMIN — CEFTRIAXONE SODIUM 1 G: 1 INJECTION, SOLUTION INTRAVENOUS at 01:08

## 2017-06-10 NOTE — ED NOTES
Pt reports back pain, general weakness, anxiety. Pt reports her sister  yesterday and the grief is overwhelming her. Pt presents to ER triage crying.     Hien Renee RN  [phone]

## 2017-06-10 NOTE — ED NOTES
Pt just complains of generalized weakness, pt is anxious.  Pt says that she gets short of breath , pt stats are 98% room air, pt is not working to breath.      Dayday Talavera RN  06/09/17 [id number]

## 2017-06-12 LAB — BACTERIA SPEC AEROBE CULT: ABNORMAL

## 2017-07-24 ENCOUNTER — APPOINTMENT (OUTPATIENT)
Dept: GENERAL RADIOLOGY | Facility: HOSPITAL | Age: 82
End: 2017-07-24

## 2017-07-24 ENCOUNTER — HOSPITAL ENCOUNTER (EMERGENCY)
Facility: HOSPITAL | Age: 82
Discharge: HOME | End: 2017-07-24
Attending: EMERGENCY MEDICINE | Admitting: EMERGENCY MEDICINE

## 2017-07-24 VITALS
HEIGHT: 63 IN | TEMPERATURE: 96.8 F | WEIGHT: 135 LBS | BODY MASS INDEX: 23.92 KG/M2 | DIASTOLIC BLOOD PRESSURE: 80 MMHG | SYSTOLIC BLOOD PRESSURE: 165 MMHG | OXYGEN SATURATION: 95 % | HEART RATE: 60 BPM | RESPIRATION RATE: 18 BRPM

## 2017-07-24 DIAGNOSIS — R41.82 ALTERED MENTAL STATUS, UNSPECIFIED ALTERED MENTAL STATUS TYPE: Primary | ICD-10-CM

## 2017-07-24 LAB
ALBUMIN SERPL-MCNC: 4.2 G/DL (ref 3.5–5.2)
ALBUMIN/GLOB SERPL: 1.4 G/DL
ALP SERPL-CCNC: 50 U/L (ref 39–117)
ALT SERPL W P-5'-P-CCNC: 28 U/L (ref 1–33)
ANION GAP SERPL CALCULATED.3IONS-SCNC: 14.9 MMOL/L
AST SERPL-CCNC: 34 U/L (ref 1–32)
BASOPHILS # BLD AUTO: 0.04 10*3/MM3 (ref 0–0.2)
BASOPHILS NFR BLD AUTO: 0.5 % (ref 0–1.5)
BILIRUB SERPL-MCNC: 0.6 MG/DL (ref 0.1–1.2)
BILIRUB UR QL STRIP: NEGATIVE
BUN BLD-MCNC: 13 MG/DL (ref 8–23)
BUN/CREAT SERPL: 12.7 (ref 7–25)
CALCIUM SPEC-SCNC: 9.7 MG/DL (ref 8.2–9.6)
CHLORIDE SERPL-SCNC: 99 MMOL/L (ref 98–107)
CLARITY UR: CLEAR
CO2 SERPL-SCNC: 23.1 MMOL/L (ref 22–29)
COLOR UR: YELLOW
CREAT BLD-MCNC: 1.02 MG/DL (ref 0.57–1)
DEPRECATED RDW RBC AUTO: 49.8 FL (ref 37–54)
EOSINOPHIL # BLD AUTO: 0.11 10*3/MM3 (ref 0–0.7)
EOSINOPHIL NFR BLD AUTO: 1.3 % (ref 0.3–6.2)
ERYTHROCYTE [DISTWIDTH] IN BLOOD BY AUTOMATED COUNT: 15.1 % (ref 11.7–13)
GFR SERPL CREATININE-BSD FRML MDRD: 50 ML/MIN/1.73
GLOBULIN UR ELPH-MCNC: 2.9 GM/DL
GLUCOSE BLD-MCNC: 107 MG/DL (ref 65–99)
GLUCOSE UR STRIP-MCNC: NEGATIVE MG/DL
HCT VFR BLD AUTO: 42.8 % (ref 35.6–45.5)
HGB BLD-MCNC: 13.8 G/DL (ref 11.9–15.5)
HGB UR QL STRIP.AUTO: NEGATIVE
IMM GRANULOCYTES # BLD: 0.02 10*3/MM3 (ref 0–0.03)
IMM GRANULOCYTES NFR BLD: 0.2 % (ref 0–0.5)
KETONES UR QL STRIP: ABNORMAL
LEUKOCYTE ESTERASE UR QL STRIP.AUTO: NEGATIVE
LYMPHOCYTES # BLD AUTO: 2 10*3/MM3 (ref 0.9–4.8)
LYMPHOCYTES NFR BLD AUTO: 24.4 % (ref 19.6–45.3)
MCH RBC QN AUTO: 29 PG (ref 26.9–32)
MCHC RBC AUTO-ENTMCNC: 32.2 G/DL (ref 32.4–36.3)
MCV RBC AUTO: 89.9 FL (ref 80.5–98.2)
MONOCYTES # BLD AUTO: 0.88 10*3/MM3 (ref 0.2–1.2)
MONOCYTES NFR BLD AUTO: 10.8 % (ref 5–12)
NEUTROPHILS # BLD AUTO: 5.13 10*3/MM3 (ref 1.9–8.1)
NEUTROPHILS NFR BLD AUTO: 62.8 % (ref 42.7–76)
NITRITE UR QL STRIP: NEGATIVE
PH UR STRIP.AUTO: <=5 [PH] (ref 5–8)
PLATELET # BLD AUTO: 231 10*3/MM3 (ref 140–500)
PMV BLD AUTO: 11.5 FL (ref 6–12)
POTASSIUM BLD-SCNC: 4.6 MMOL/L (ref 3.5–5.2)
PROT SERPL-MCNC: 7.1 G/DL (ref 6–8.5)
PROT UR QL STRIP: ABNORMAL
RBC # BLD AUTO: 4.76 10*6/MM3 (ref 3.9–5.2)
SODIUM BLD-SCNC: 137 MMOL/L (ref 136–145)
SP GR UR STRIP: 1.01 (ref 1–1.03)
UROBILINOGEN UR QL STRIP: ABNORMAL
WBC NRBC COR # BLD: 8.18 10*3/MM3 (ref 4.5–10.7)

## 2017-07-24 PROCEDURE — 72170 X-RAY EXAM OF PELVIS: CPT

## 2017-07-24 PROCEDURE — 80053 COMPREHEN METABOLIC PANEL: CPT | Performed by: PHYSICIAN ASSISTANT

## 2017-07-24 PROCEDURE — 85025 COMPLETE CBC W/AUTO DIFF WBC: CPT | Performed by: PHYSICIAN ASSISTANT

## 2017-07-24 PROCEDURE — 90791 PSYCH DIAGNOSTIC EVALUATION: CPT

## 2017-07-24 PROCEDURE — 99285 EMERGENCY DEPT VISIT HI MDM: CPT

## 2017-07-24 PROCEDURE — 81003 URINALYSIS AUTO W/O SCOPE: CPT | Performed by: PHYSICIAN ASSISTANT
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
## 2017-07-24 NOTE — ED PROVIDER NOTES
Pt presents to the ED after many episodes of panic. Pt reports generalized myalgias. Pt's caregiver reports that she gets extremely agitated. Per pt's caregiver, pt's PCP prescribed Klonopin that she only takes occasionally. I agree with plan to consult pt's PCP to discuss prescribing Ativan PO or an atypical psychotic.   On exam, pt was pleasantly demented, in NAD, heart had a 2/6 systolic ejection murmur, lungs were CTAB, she was awake, alert, oriented x0.    I supervised care provided by the midlevel provider.    We have discussed this patient's history, physical exam, and treatment plan.   I have reviewed the note and personally saw and examined the patient and agree with the plan of care.    Documentation assistance provided by henrique Aguirre for Dr. eWsley.  Information recorded by the scribe was done at my direction and has been verified and validated by me.     Nuvia Aguirre  07/24/17 2018       Jomar Wesley MD  07/24/17 0088

## 2017-07-24 NOTE — ED PROVIDER NOTES
EMERGENCY DEPARTMENT ENCOUNTER    CHIEF COMPLAINT  Chief Complaint: Psych Evaluation  History given by: Patient / EMS  History limited by: Dementia   Room Number: 04/04  PMD: Waqar Melissa MD      HPI:  Pt is a 89 y.o. female who presents to the ED via EMS from home after the patient had multiple episodes of panic over the past five days when she had a new home health caretaker come to her home. The patient reportedly called the Police two separate times and they eventually told her that EMS would bring her to the ED if she called again. Her caretaker and friend at bedside note that that patient has a h/o of becoming afraid and exhibiting similar symptoms in the past. The patient also had a fall several days ago and she is complaining of persistent right hip pain. No other complaints at this time. History limited by dementia.  No SI.        Duration:  5 days  Onset: Gradual  Timing: Constant  Radiation: None mentioned  Quality: Dull  Intensity/Severity: Moderate  Progression: Unknown  Associated Symptoms: Arthralgias, panic  Aggravating Factors: Palpation  Alleviating Factors: Unknown  Previous Episodes: Unknown  Treatment before arrival: None mentioned     PAST MEDICAL HISTORY  Active Ambulatory Problems     Diagnosis Date Noted   • Abdominal pain, vomiting, and diarrhea 03/16/2016   • Chronic constipation 03/16/2016   • Hemorrhoid 03/16/2016   • BP (high blood pressure) 03/16/2016   • Arthralgia of hip 03/16/2016   • LBP (low back pain) 03/16/2016   • Disease of thyroid gland 03/16/2016   • Benign essential HTN 05/28/2017   • Chronic kidney disease, stage III (moderate) 05/28/2017   • Debility 05/28/2017   • Chronic systolic CHF (congestive heart failure) 05/28/2017   • Coronary artery disease 05/29/2017     Resolved Ambulatory Problems     Diagnosis Date Noted   • Colitis presumed infectious (bacterial) 05/28/2017   • Rectal bleeding 05/29/2017   • Abnormal finding in urine 05/29/2017     Past Medical  History:   Diagnosis Date   • Arthritis    • CHF (congestive heart failure)    • Coronary artery disease    • Disease of thyroid gland 3/16/2016   • Diverticulitis    • Hypertension        PAST SURGICAL HISTORY  Past Surgical History:   Procedure Laterality Date   • ABDOMINAL SURGERY      liban   • APPENDECTOMY     • COLON SURGERY      fistulectomy   • EYE SURGERY      cataract   • HYSTERECTOMY     • TONSILLECTOMY         FAMILY HISTORY  Family History   Problem Relation Age of Onset   • Hypertension Other        SOCIAL HISTORY  Social History     Social History   • Marital status: Single     Spouse name: N/A   • Number of children: N/A   • Years of education: N/A     Occupational History   • Not on file.     Social History Main Topics   • Smoking status: Never Smoker   • Smokeless tobacco: Not on file   • Alcohol use No   • Drug use: No   • Sexual activity: Defer     Other Topics Concern   • Not on file     Social History Narrative   • No narrative on file       ALLERGIES  Amoxicillin; Atorvastatin; Diazepam; Levoxyl  [levothyroxine sodium]; Lexapro  [escitalopram oxalate]; Lopressor  [metoprolol tartrate]; Metaxalone; Omeprazole; and Penicillins    REVIEW OF SYSTEMS  Review of Systems   Unable to perform ROS: Dementia       PHYSICAL EXAM  ED Triage Vitals   Temp Heart Rate Resp BP SpO2   07/24/17 1145 07/24/17 1142 07/24/17 1142 07/24/17 1142 07/24/17 1142   96.8 °F (36 °C) 90 16 160/90 98 %      Temp src Heart Rate Source Patient Position BP Location FiO2 (%)   07/24/17 1145 07/24/17 1142 -- -- --   Tympanic Monitor          Physical Exam   Constitutional: She is well-developed, well-nourished, and in no distress.   Pleasantly confused.    HENT:   Head: Normocephalic and atraumatic.   Eyes: EOM are normal.   Neck: Normal range of motion.   Cardiovascular: Normal rate and regular rhythm.    Pulmonary/Chest: Effort normal and breath sounds normal. No respiratory distress.   Abdominal: Soft. There is no tenderness.    Musculoskeletal: Normal range of motion.   Neurological: She is alert. She has normal sensation and normal strength.   Skin: Skin is warm and dry.   Psychiatric: Affect normal.   Nursing note and vitals reviewed.      LAB RESULTS  Lab Results (last 24 hours)     Procedure Component Value Units Date/Time    Urinalysis With / Culture If Indicated [[id number]]  (Abnormal) Collected:  07/24/17 1814    Specimen:  Urine from Urine, Catheter Updated:  07/24/17 1823     Color, UA Yellow     Appearance, UA Clear     pH, UA <=5.0     Specific Gravity, UA 1.013     Glucose, UA Negative     Ketones, UA Trace (A)     Bilirubin, UA Negative     Blood, UA Negative     Protein, UA Trace (A)     Leuk Esterase, UA Negative     Nitrite, UA Negative     Urobilinogen, UA 0.2 E.U./dL    Narrative:       Urine microscopic not indicated.    Comprehensive Metabolic Panel [[id number]]  (Abnormal) Collected:  07/24/17 1826    Specimen:  Blood from Arm, Left Updated:  07/24/17 1905     Glucose 107 (H) mg/dL      BUN 13 mg/dL      Creatinine 1.02 (H) mg/dL      Sodium 137 mmol/L      Potassium 4.6 mmol/L      Chloride 99 mmol/L      CO2 23.1 mmol/L      Calcium 9.7 (H) mg/dL      Total Protein 7.1 g/dL      Albumin 4.20 g/dL      ALT (SGPT) 28 U/L      AST (SGOT) 34 (H) U/L      Alkaline Phosphatase 50 U/L      Total Bilirubin 0.6 mg/dL      eGFR Non African Amer 50 (L) mL/min/1.73      Globulin 2.9 gm/dL      A/G Ratio 1.4 g/dL      BUN/Creatinine Ratio 12.7     Anion Gap 14.9 mmol/L     Narrative:       The MDRD GFR formula is only valid for adults with stable renal function between ages 18 and 70.    CBC & Differential [[id number]] Collected:  07/24/17 1827    Specimen:  Blood Updated:  07/24/17 1844    Narrative:       The following orders were created for panel order CBC & Differential.  Procedure                               Abnormality         Status                     ---------                               -----------         ------                      CBC Auto Differential[[id number]]        Abnormal            Final result                 Please view results for these tests on the individual orders.    CBC Auto Differential [[id number]]  (Abnormal) Collected:  07/24/17 1827    Specimen:  Blood from Arm, Left Updated:  07/24/17 1844     WBC 8.18 10*3/mm3      RBC 4.76 10*6/mm3      Hemoglobin 13.8 g/dL      Hematocrit 42.8 %      MCV 89.9 fL      MCH 29.0 pg      MCHC 32.2 (L) g/dL      RDW 15.1 (H) %      RDW-SD 49.8 fl      MPV 11.5 fL      Platelets 231 10*3/mm3      Neutrophil % 62.8 %      Lymphocyte % 24.4 %      Monocyte % 10.8 %      Eosinophil % 1.3 %      Basophil % 0.5 %      Immature Grans % 0.2 %      Neutrophils, Absolute 5.13 10*3/mm3      Lymphocytes, Absolute 2.00 10*3/mm3      Monocytes, Absolute 0.88 10*3/mm3      Eosinophils, Absolute 0.11 10*3/mm3      Basophils, Absolute 0.04 10*3/mm3      Immature Grans, Absolute 0.02 10*3/mm3           I ordered the above labs and reviewed the results    RADIOLOGY  XR Pelvis 1 or 2 View   Final Result   Single view of the pelvis shows advanced osteoarthritic   change at the right hip. There is also degenerative change in the lumbar   spine and at the left hip. No fracture is identified.       This report was finalized on 7/24/2017 5:54 PM by Dr. Jose Armando Mora MD.             I ordered the above noted radiological studies. Interpreted by radiologist. Reviewed by me in PACS.       PROCEDURES  Procedures      PROGRESS AND CONSULTS  ED Course     11:59  Ordered XR Pelvis for further evaluation.     16:56  Ordered Labs and [facility] for further evaluation.     19:27  Spoke with Breana from [facility] after she evaluated the patient. She spoke with the patient's POA who did not want the patient to be admitted.      19:44  Discussed case with  and he agrees with the treatment plan.     19:45  Rechecked the patient and she is resting comfortably. Discussed pertinent lab and imaging results,  including her UA and XR Pelvis shows nothing acute. Discussed the plan to discharge the patient home and recommended follow up with her PCP as directed. Patient and caretaker agrees with plan and all questions were addressed.     Latest vital signs   BP- 161/75 HR- 74 Temp- 96.8 °F (36 °C) (Tympanic) O2 sat- 98%      MEDICAL DECISION MAKING  Results were reviewed/discussed with the patient and they were also made aware of online access. Pt also made aware that some labs, such as cultures, will not be resulted during ER visit and follow up with PMD is necessary.     MDM  Number of Diagnoses or Management Options  Altered mental status, unspecified altered mental status type:   Diagnosis management comments: No evidence of infection or sepsis.  Pt's POA declines [facility] admission.        Amount and/or Complexity of Data Reviewed  Clinical lab tests: ordered and reviewed  Tests in the radiology section of CPT®: ordered and reviewed (XR Pelvis - Single view of the pelvis shows advanced osteoarthritic change at the right hip. There is also degenerative change in the lumbar spine and at the left hip. No fracture is identified.)  Decide to obtain previous medical records or to obtain history from someone other than the patient: yes    Patient Progress  Patient progress: stable         DIAGNOSIS  Final diagnoses:   Altered mental status, unspecified altered mental status type       DISPOSITION  DISCHARGE    Patient discharged in stable condition.    Reviewed implications of results, diagnosis, meds, responsibility to follow up, warning signs and symptoms of possible worsening, potential complications and reasons to return to ER, including worsening behavioral issues.     Patient/Family voiced understanding of above instructions.    Discussed plan for discharge, as there is no emergent indication for admission.  Pt/family is agreeable and understands need for follow up and repeat testing.  Pt is aware that discharge does not  mean that nothing is wrong but it indicates no emergency is present that requires admission and they must continue care with follow-up as given below or physician of their choice.     FOLLOW-UP  Waqar Melissa MD  [address]  [place] [id number]  [phone]    Schedule an appointment as soon as possible for a visit           Medication List      Notice     No changes were made to your prescriptions during this visit.          Latest Documented Vital Signs:  As of 7:52 PM  BP- 161/75 HR- 74 Temp- 96.8 °F (36 °C) (Tympanic) O2 sat- 98%    --  Documentation assistance provided by henrique Lynne for Chidi Reina PA-C.  Information recorded by the scribe was done at my direction and has been verified and validated by me.        Kain Lynne  07/24/17 1955       RICHARD Palma  07/25/17 0032

## 2017-07-24 NOTE — ED TRIAGE NOTES
"On Thursday, pt states she was, \"pushed down in my front yard by a devil.\" However, pt has severe alzheimer's disease. Pt can answer all orientation questions correctly, but per EMS, pt is definitely altered in her mental status (judging by some of the things she says). Pt is now c/o right hip pain. No obvious deformity noted. Legs are both equal and no shortening or turning of right leg is noted  "

## 2017-07-24 NOTE — ED TRIAGE NOTES
Becky ( from [facility]) reported patient is really here for a psych eval. They do not want her to know this. She wants to speak to MD prior to eval.

## 2017-07-28 ENCOUNTER — HOSPITAL ENCOUNTER (EMERGENCY)
Facility: HOSPITAL | Age: 82
Discharge: HOME | End: 2017-07-29
Attending: EMERGENCY MEDICINE | Admitting: EMERGENCY MEDICINE

## 2017-07-28 ENCOUNTER — APPOINTMENT (OUTPATIENT)
Dept: CT IMAGING | Facility: HOSPITAL | Age: 82
End: 2017-07-28

## 2017-07-28 ENCOUNTER — APPOINTMENT (OUTPATIENT)
Dept: GENERAL RADIOLOGY | Facility: HOSPITAL | Age: 82
End: 2017-07-28

## 2017-07-28 DIAGNOSIS — K59.01 SLOW TRANSIT CONSTIPATION: Primary | ICD-10-CM

## 2017-07-28 LAB
ALBUMIN SERPL-MCNC: 3.6 G/DL (ref 3.5–5.2)
ALBUMIN/GLOB SERPL: 1.3 G/DL
ALP SERPL-CCNC: 43 U/L (ref 39–117)
ALT SERPL W P-5'-P-CCNC: 23 U/L (ref 1–33)
ANION GAP SERPL CALCULATED.3IONS-SCNC: 12.1 MMOL/L
AST SERPL-CCNC: 25 U/L (ref 1–32)
BASOPHILS # BLD AUTO: 0.03 10*3/MM3 (ref 0–0.2)
BASOPHILS NFR BLD AUTO: 0.4 % (ref 0–1.5)
BILIRUB SERPL-MCNC: 0.5 MG/DL (ref 0.1–1.2)
BILIRUB UR QL STRIP: NEGATIVE
BUN BLD-MCNC: 8 MG/DL (ref 8–23)
BUN/CREAT SERPL: 9.4 (ref 7–25)
CALCIUM SPEC-SCNC: 9.6 MG/DL (ref 8.2–9.6)
CHLORIDE SERPL-SCNC: 99 MMOL/L (ref 98–107)
CLARITY UR: CLEAR
CO2 SERPL-SCNC: 20.9 MMOL/L (ref 22–29)
COLOR UR: YELLOW
CREAT BLD-MCNC: 0.85 MG/DL (ref 0.57–1)
DEPRECATED RDW RBC AUTO: 49 FL (ref 37–54)
EOSINOPHIL # BLD AUTO: 0.14 10*3/MM3 (ref 0–0.7)
EOSINOPHIL NFR BLD AUTO: 2 % (ref 0.3–6.2)
ERYTHROCYTE [DISTWIDTH] IN BLOOD BY AUTOMATED COUNT: 15.1 % (ref 11.7–13)
GFR SERPL CREATININE-BSD FRML MDRD: 62 ML/MIN/1.73
GLOBULIN UR ELPH-MCNC: 2.7 GM/DL
GLUCOSE BLD-MCNC: 99 MG/DL (ref 65–99)
GLUCOSE UR STRIP-MCNC: NEGATIVE MG/DL
HCT VFR BLD AUTO: 36.4 % (ref 35.6–45.5)
HGB BLD-MCNC: 11.9 G/DL (ref 11.9–15.5)
HGB UR QL STRIP.AUTO: NEGATIVE
IMM GRANULOCYTES # BLD: 0 10*3/MM3 (ref 0–0.03)
IMM GRANULOCYTES NFR BLD: 0 % (ref 0–0.5)
KETONES UR QL STRIP: NEGATIVE
LEUKOCYTE ESTERASE UR QL STRIP.AUTO: NEGATIVE
LYMPHOCYTES # BLD AUTO: 1.95 10*3/MM3 (ref 0.9–4.8)
LYMPHOCYTES NFR BLD AUTO: 28 % (ref 19.6–45.3)
MCH RBC QN AUTO: 29 PG (ref 26.9–32)
MCHC RBC AUTO-ENTMCNC: 32.7 G/DL (ref 32.4–36.3)
MCV RBC AUTO: 88.6 FL (ref 80.5–98.2)
MONOCYTES # BLD AUTO: 0.9 10*3/MM3 (ref 0.2–1.2)
MONOCYTES NFR BLD AUTO: 12.9 % (ref 5–12)
NEUTROPHILS # BLD AUTO: 3.95 10*3/MM3 (ref 1.9–8.1)
NEUTROPHILS NFR BLD AUTO: 56.7 % (ref 42.7–76)
NITRITE UR QL STRIP: NEGATIVE
PH UR STRIP.AUTO: <=5 [PH] (ref 5–8)
PLATELET # BLD AUTO: 176 10*3/MM3 (ref 140–500)
PMV BLD AUTO: 11.8 FL (ref 6–12)
POTASSIUM BLD-SCNC: 4.3 MMOL/L (ref 3.5–5.2)
PROT SERPL-MCNC: 6.3 G/DL (ref 6–8.5)
PROT UR QL STRIP: NEGATIVE
RBC # BLD AUTO: 4.11 10*6/MM3 (ref 3.9–5.2)
SODIUM BLD-SCNC: 132 MMOL/L (ref 136–145)
SP GR UR STRIP: 1.01 (ref 1–1.03)
UROBILINOGEN UR QL STRIP: NORMAL
WBC NRBC COR # BLD: 6.97 10*3/MM3 (ref 4.5–10.7)

## 2017-07-28 PROCEDURE — 81003 URINALYSIS AUTO W/O SCOPE: CPT | Performed by: EMERGENCY MEDICINE

## 2017-07-28 PROCEDURE — 96361 HYDRATE IV INFUSION ADD-ON: CPT

## 2017-07-28 PROCEDURE — 96374 THER/PROPH/DIAG INJ IV PUSH: CPT

## 2017-07-28 PROCEDURE — 74022 RADEX COMPL AQT ABD SERIES: CPT

## 2017-07-28 PROCEDURE — 85025 COMPLETE CBC W/AUTO DIFF WBC: CPT | Performed by: EMERGENCY MEDICINE

## 2017-07-28 PROCEDURE — 74176 CT ABD & PELVIS W/O CONTRAST: CPT

## 2017-07-28 PROCEDURE — 80053 COMPREHEN METABOLIC PANEL: CPT | Performed by: EMERGENCY MEDICINE

## 2017-07-28 PROCEDURE — 99285 EMERGENCY DEPT VISIT HI MDM: CPT

## 2017-07-28 RX ORDER — FAMOTIDINE 10 MG/ML
20 INJECTION, SOLUTION INTRAVENOUS ONCE
Status: COMPLETED | OUTPATIENT
Start: 2017-07-28 | End: 2017-07-28

## 2017-07-28 RX ADMIN — FAMOTIDINE 20 MG: 10 INJECTION, SOLUTION INTRAVENOUS at 22:17

## 2017-07-28 RX ADMIN — SODIUM CHLORIDE 500 ML: 9 INJECTION, SOLUTION INTRAVENOUS at 22:16

## 2017-07-29 VITALS
BODY MASS INDEX: 22.2 KG/M2 | TEMPERATURE: 97.9 F | HEIGHT: 64 IN | DIASTOLIC BLOOD PRESSURE: 98 MMHG | SYSTOLIC BLOOD PRESSURE: 149 MMHG | RESPIRATION RATE: 20 BRPM | WEIGHT: 130 LBS | OXYGEN SATURATION: 96 % | HEART RATE: 68 BPM

## 2017-12-09 ENCOUNTER — HOSPITAL ENCOUNTER (EMERGENCY)
Facility: HOSPITAL | Age: 82
Discharge: HOME | End: 2017-12-09
Attending: EMERGENCY MEDICINE | Admitting: EMERGENCY MEDICINE

## 2017-12-09 ENCOUNTER — APPOINTMENT (OUTPATIENT)
Dept: GENERAL RADIOLOGY | Facility: HOSPITAL | Age: 82
End: 2017-12-09

## 2017-12-09 VITALS
WEIGHT: 133 LBS | TEMPERATURE: 98.1 F | DIASTOLIC BLOOD PRESSURE: 65 MMHG | SYSTOLIC BLOOD PRESSURE: 142 MMHG | HEART RATE: 71 BPM | RESPIRATION RATE: 24 BRPM | OXYGEN SATURATION: 97 % | HEIGHT: 64 IN | BODY MASS INDEX: 22.71 KG/M2

## 2017-12-09 DIAGNOSIS — J40 BRONCHITIS: Primary | ICD-10-CM

## 2017-12-09 LAB
FLUAV AG NPH QL: NEGATIVE
FLUBV AG NPH QL IA: NEGATIVE
S PYO AG THROAT QL: NEGATIVE

## 2017-12-09 PROCEDURE — 87880 STREP A ASSAY W/OPTIC: CPT | Performed by: EMERGENCY MEDICINE

## 2017-12-09 PROCEDURE — 87081 CULTURE SCREEN ONLY: CPT | Performed by: EMERGENCY MEDICINE

## 2017-12-09 PROCEDURE — 99284 EMERGENCY DEPT VISIT MOD MDM: CPT

## 2017-12-09 PROCEDURE — 87804 INFLUENZA ASSAY W/OPTIC: CPT | Performed by: EMERGENCY MEDICINE

## 2017-12-09 PROCEDURE — 71020 HC CHEST PA AND LATERAL: CPT

## 2017-12-09 RX ORDER — CHOLECALCIFEROL (VITAMIN D3) 125 MCG
500 CAPSULE ORAL DAILY
COMMUNITY

## 2017-12-09 RX ORDER — PYRIDOXINE HCL (VITAMIN B6) 100 MG
100 TABLET ORAL DAILY
COMMUNITY
End: 2021-10-14 | Stop reason: HOSPADM

## 2017-12-09 RX ORDER — ACETAMINOPHEN 325 MG/1
975 TABLET ORAL ONCE
Status: COMPLETED | OUTPATIENT
Start: 2017-12-09 | End: 2017-12-09

## 2017-12-09 RX ORDER — DOXYCYCLINE 100 MG/1
100 CAPSULE ORAL 2 TIMES DAILY
Qty: 14 CAPSULE | Refills: 0 | Status: SHIPPED | OUTPATIENT
Start: 2017-12-09 | End: 2018-05-29

## 2017-12-09 RX ADMIN — ACETAMINOPHEN 975 MG: 325 TABLET ORAL at 15:55

## 2017-12-09 NOTE — ED PROVIDER NOTES
" EMERGENCY DEPARTMENT ENCOUNTER    CHIEF COMPLAINT  Chief Complaint: Sore throat  History given by: Patient  History limited by: Nothing  Room Number: 33/33  PMD: Waqar Melissa MD      HPI:  Pt is a 89 y.o. female who presents complaining of sore throat onset 4 days ago. Pt reports productive cough with gray sputum, ear pain, and eye discharge. Pt denies wheezing and fever. The pt states she has tried taking cough drops with no improvement in her symptoms. The pt did not receive her flu shot this year.    Duration: 4 days  Onset: Gradual  Timing: Constant  Location: Oropharynx  Radiation: None  Quality: \"Pain\"  Intensity/Severity: Moderate  Progression: Unchanged  Associated Symptoms: Productive cough with gray sputum, ear pain, and eye discharge  Aggravating Factors: None stated  Alleviating Factors: None stated  Previous Episodes: None  Treatment before arrival: The pt states she has tried taking cough drops with no improvement in her symptoms.    PAST MEDICAL HISTORY  Active Ambulatory Problems     Diagnosis Date Noted   • Abdominal pain, vomiting, and diarrhea 03/16/2016   • Chronic constipation 03/16/2016   • Hemorrhoid 03/16/2016   • BP (high blood pressure) 03/16/2016   • Arthralgia of hip 03/16/2016   • LBP (low back pain) 03/16/2016   • Disease of thyroid gland 03/16/2016   • Benign essential HTN 05/28/2017   • Chronic kidney disease, stage III (moderate) 05/28/2017   • Debility 05/28/2017   • Chronic systolic CHF (congestive heart failure) 05/28/2017   • Coronary artery disease 05/29/2017     Resolved Ambulatory Problems     Diagnosis Date Noted   • Colitis presumed infectious (bacterial) 05/28/2017   • Rectal bleeding 05/29/2017   • Abnormal finding in urine 05/29/2017     Past Medical History:   Diagnosis Date   • Arthritis    • CHF (congestive heart failure)    • Coronary artery disease    • Disease of thyroid gland 3/16/2016   • Diverticulitis    • Hypertension        PAST SURGICAL " HISTORY  Past Surgical History:   Procedure Laterality Date   • ABDOMINAL SURGERY      liban   • APPENDECTOMY     • COLON SURGERY      fistulectomy   • EYE SURGERY      cataract   • HYSTERECTOMY     • TONSILLECTOMY         FAMILY HISTORY  Family History   Problem Relation Age of Onset   • Hypertension Other        SOCIAL HISTORY  Social History     Social History   • Marital status: Single     Spouse name: N/A   • Number of children: N/A   • Years of education: N/A     Occupational History   • Not on file.     Social History Main Topics   • Smoking status: Never Smoker   • Smokeless tobacco: Not on file   • Alcohol use No   • Drug use: No   • Sexual activity: Defer     Other Topics Concern   • Not on file     Social History Narrative   • No narrative on file       ALLERGIES  Amoxicillin; Cortisone; Diazepam; Erythromycin; Horse-derived products; Lipitor [atorvastatin]; Levoxyl  [levothyroxine sodium]; Lexapro  [escitalopram oxalate]; Lopressor  [metoprolol tartrate]; Metaxalone; Omeprazole; Penicillins; Sesame seed (diagnostic); Solarcaine [benzocaine]; and Sulfa antibiotics    REVIEW OF SYSTEMS  Review of Systems   Constitutional: Negative for chills and fever.   HENT: Positive for sore throat. Negative for trouble swallowing.    Eyes: Positive for discharge. Negative for visual disturbance.   Respiratory: Positive for cough (Productive with gray sputum). Negative for shortness of breath.    Cardiovascular: Negative for chest pain, palpitations and leg swelling.   Gastrointestinal: Negative for abdominal pain, diarrhea and vomiting.   Endocrine: Negative.    Genitourinary: Negative for decreased urine volume, dysuria and frequency.   Musculoskeletal: Negative for neck pain.   Skin: Negative for rash.   Allergic/Immunologic: Negative.    Neurological: Negative for syncope, weakness, numbness and headaches.   Hematological: Negative.    Psychiatric/Behavioral: Negative.    All other systems reviewed and are  negative.      PHYSICAL EXAM  ED Triage Vitals   Temp Heart Rate Resp BP SpO2   12/09/17 1357 12/09/17 1357 12/09/17 1357 12/09/17 1357 12/09/17 1357   100.7 °F (38.2 °C) 83 18 138/77 96 %      Temp src Heart Rate Source Patient Position BP Location FiO2 (%)   12/09/17 1357 12/09/17 1357 12/09/17 1357 12/09/17 1357 --   Tympanic Monitor Sitting Left arm        Physical Exam   Constitutional: She is oriented to person, place, and time and well-developed, well-nourished, and in no distress. No distress.   HENT:   Head: Normocephalic and atraumatic.   The pt has a hoarse voice, but she is able to tolerate her secretions. The pt has oropharyngeal drainage with some erythema.   Eyes: EOM are normal. Pupils are equal, round, and reactive to light.   The pt has right conjunctival erythema.   Neck: Normal range of motion. Neck supple.   Cardiovascular: Normal rate, regular rhythm and normal heart sounds.    Pulmonary/Chest: Effort normal and breath sounds normal. No respiratory distress.   Abdominal: Soft. There is no tenderness. There is no rebound and no guarding.   Musculoskeletal: Normal range of motion. She exhibits no edema.   Neurological: She is alert and oriented to person, place, and time. She has normal sensation and normal strength.   Skin: Skin is warm and dry. No rash noted.   Psychiatric: Mood and affect normal.   Nursing note and vitals reviewed.      LAB RESULTS  Lab Results (last 24 hours)     Procedure Component Value Units Date/Time    Influenza Antigen, Rapid - Swab, Nasopharynx [[id number]]  (Normal) Collected:  12/09/17 1450    Specimen:  Swab from Nasopharynx Updated:  12/09/17 1515     Influenza A Ag, EIA Negative     Influenza B Ag, EIA Negative    Rapid Strep A Screen - Swab, Throat [[id number]]  (Normal) Collected:  12/09/17 1555    Specimen:  Swab from Throat Updated:  12/09/17 1610     Strep A Ag Negative    Beta Strep Culture, Throat - Swab, Throat [[id number]] Collected:  12/09/17 1555     Specimen:  Swab from Throat Updated:  12/09/17 1610          I ordered the above labs and reviewed the results    RADIOLOGY  XR Chest 2 View   Preliminary Result       No convincing acute change when compared to prior chest x-ray 07/28/2017   with no definite active disease seen in the chest. There is a 3.5 x 2.2   cm opacity creating a double density over the right heart border that is   unchanged since prior chest x-ray 07/28/2017. On images through the lung   bases on a CT of the abdomen and pelvis the same day, 07/28/2017 there   is some atelectasis and scarring along the margins of an inferior   pulmonary vein and likely accounts for this opacity. There is some   posterior eventration of the left hemidiaphragm felt to account for the   opacity at the left base.               I ordered the above noted radiological studies. Interpreted by radiologist. Reviewed by me in PACS.       PROCEDURES  Procedures      PROGRESS AND CONSULTS  ED Course     1431  CXR and labs ordered for further evaluation.    1625  BP- 171/86 HR- 83 Temp- (!) 100.7 °F (38.2 °C) (Tympanic) O2 sat- 96%    Rechecked pt, she is resting comfortably. Discussed the negative lab and CXR results with the pt including the XR that was negative for pneumonia. Discussed the plan to discharge the pt. The pt understands and agrees with the plan.    MEDICAL DECISION MAKING  Results were reviewed/discussed with the patient and they were also made aware of online access. Pt also made aware that some labs, such as cultures, will not be resulted during ER visit and follow up with PMD is necessary.     MDM  Number of Diagnoses or Management Options  Bronchitis:      Amount and/or Complexity of Data Reviewed  Clinical lab tests: ordered and reviewed (Strep and Influenza - Negative)  Tests in the radiology section of CPT®: ordered and reviewed (CXR - No convincing acute change when compared to prior chest x-ray 07/28/2017  with no definite active disease seen in  the chest. There is a 3.5 x 2.2  cm opacity creating a double density over the right heart border that is  unchanged since prior chest x-ray 07/28/2017. On images through the lung  bases on a CT of the abdomen and pelvis the same day, 07/28/2017 there  is some atelectasis and scarring along the margins of an inferior  pulmonary vein and likely accounts for this opacity. There is some  posterior eventration of the left hemidiaphragm felt to account for the  opacity at the left base.)    Patient Progress  Patient progress: stable         DIAGNOSIS  Final diagnoses:   Bronchitis       DISPOSITION  DISCHARGE    Patient discharged in stable condition.    Reviewed implications of results, diagnosis, meds, responsibility to follow up, warning signs and symptoms of possible worsening, potential complications and reasons to return to ER.    Patient/Family voiced understanding of above instructions.    Discussed plan for discharge, as there is no emergent indication for admission.  Pt/family is agreeable and understands need for follow up and repeat testing.  Pt is aware that discharge does not mean that nothing is wrong but it indicates no emergency is present that requires admission and they must continue care with follow-up as given below or physician of their choice.     FOLLOW-UP  Waqar Melissa MD  [address]  [address]  [place] [id number]  [phone]    Schedule an appointment as soon as possible for a visit  As needed    [facility] Emergency Department  [address] [facility] [address] [phone]    If symptoms worsen         Medication List      New Prescriptions          doxycycline 100 MG capsule   Commonly known as:  MONODOX   Take 1 capsule by mouth 2 (Two) Times a Day.         Stop          sulfamethoxazole-trimethoprim 800-160 MG per tablet   Commonly known as:  BACTRIM DS               Latest Documented Vital Signs:  As of 4:26 PM  BP- 171/86 HR- 83 Temp-  (!) 100.7 °F (38.2 °C) (Tympanic) O2 sat- 96%    --  Documentation assistance provided by henrique Campbell for Dr. Graham.  Information recorded by the scribe was done at my direction and has been verified and validated by me.       Ashish Campbell  12/09/17 163       Charles Graham MD  12/09/17 1345

## 2017-12-11 LAB — BACTERIA SPEC AEROBE CULT: NORMAL

## 2018-02-22 ENCOUNTER — APPOINTMENT (OUTPATIENT)
Dept: GENERAL RADIOLOGY | Facility: HOSPITAL | Age: 83
End: 2018-02-22

## 2018-02-22 ENCOUNTER — APPOINTMENT (OUTPATIENT)
Dept: CARDIOLOGY | Facility: HOSPITAL | Age: 83
End: 2018-02-22
Attending: EMERGENCY MEDICINE

## 2018-02-22 ENCOUNTER — HOSPITAL ENCOUNTER (EMERGENCY)
Facility: HOSPITAL | Age: 83
Discharge: HOME | End: 2018-02-22
Attending: EMERGENCY MEDICINE | Admitting: EMERGENCY MEDICINE

## 2018-02-22 VITALS
OXYGEN SATURATION: 98 % | HEIGHT: 64 IN | TEMPERATURE: 98.3 F | RESPIRATION RATE: 16 BRPM | HEART RATE: 75 BPM | DIASTOLIC BLOOD PRESSURE: 78 MMHG | WEIGHT: 135 LBS | BODY MASS INDEX: 23.05 KG/M2 | SYSTOLIC BLOOD PRESSURE: 160 MMHG

## 2018-02-22 DIAGNOSIS — R60.0 LOWER EXTREMITY EDEMA: Primary | ICD-10-CM

## 2018-02-22 LAB
ALBUMIN SERPL-MCNC: 3.8 G/DL (ref 3.5–5.2)
ALBUMIN/GLOB SERPL: 1.2 G/DL
ALP SERPL-CCNC: 53 U/L (ref 39–117)
ALT SERPL W P-5'-P-CCNC: 17 U/L (ref 1–33)
ANION GAP SERPL CALCULATED.3IONS-SCNC: 12.6 MMOL/L
AST SERPL-CCNC: 21 U/L (ref 1–32)
BASOPHILS # BLD AUTO: 0.03 10*3/MM3 (ref 0–0.2)
BASOPHILS NFR BLD AUTO: 0.5 % (ref 0–1.5)
BILIRUB SERPL-MCNC: 0.4 MG/DL (ref 0.1–1.2)
BUN BLD-MCNC: 33 MG/DL (ref 8–23)
BUN/CREAT SERPL: 35.9 (ref 7–25)
CALCIUM SPEC-SCNC: 9.8 MG/DL (ref 8.2–9.6)
CHLORIDE SERPL-SCNC: 102 MMOL/L (ref 98–107)
CO2 SERPL-SCNC: 26.4 MMOL/L (ref 22–29)
CREAT BLD-MCNC: 0.92 MG/DL (ref 0.57–1)
DEPRECATED RDW RBC AUTO: 50.8 FL (ref 37–54)
EOSINOPHIL # BLD AUTO: 0.12 10*3/MM3 (ref 0–0.7)
EOSINOPHIL NFR BLD AUTO: 1.9 % (ref 0.3–6.2)
ERYTHROCYTE [DISTWIDTH] IN BLOOD BY AUTOMATED COUNT: 15.3 % (ref 11.7–13)
GFR SERPL CREATININE-BSD FRML MDRD: 57 ML/MIN/1.73
GLOBULIN UR ELPH-MCNC: 3.3 GM/DL
GLUCOSE BLD-MCNC: 98 MG/DL (ref 65–99)
HCT VFR BLD AUTO: 41.2 % (ref 35.6–45.5)
HGB BLD-MCNC: 13 G/DL (ref 11.9–15.5)
HOLD SPECIMEN: NORMAL
HOLD SPECIMEN: NORMAL
IMM GRANULOCYTES # BLD: 0 10*3/MM3 (ref 0–0.03)
IMM GRANULOCYTES NFR BLD: 0 % (ref 0–0.5)
LYMPHOCYTES # BLD AUTO: 1.85 10*3/MM3 (ref 0.9–4.8)
LYMPHOCYTES NFR BLD AUTO: 29.6 % (ref 19.6–45.3)
MCH RBC QN AUTO: 28.6 PG (ref 26.9–32)
MCHC RBC AUTO-ENTMCNC: 31.6 G/DL (ref 32.4–36.3)
MCV RBC AUTO: 90.5 FL (ref 80.5–98.2)
MONOCYTES # BLD AUTO: 0.5 10*3/MM3 (ref 0.2–1.2)
MONOCYTES NFR BLD AUTO: 8 % (ref 5–12)
NEUTROPHILS # BLD AUTO: 3.75 10*3/MM3 (ref 1.9–8.1)
NEUTROPHILS NFR BLD AUTO: 60 % (ref 42.7–76)
NT-PROBNP SERPL-MCNC: 1452 PG/ML (ref 0–1800)
PLATELET # BLD AUTO: 186 10*3/MM3 (ref 140–500)
PMV BLD AUTO: 11.2 FL (ref 6–12)
POTASSIUM BLD-SCNC: 4.4 MMOL/L (ref 3.5–5.2)
PROT SERPL-MCNC: 7.1 G/DL (ref 6–8.5)
RBC # BLD AUTO: 4.55 10*6/MM3 (ref 3.9–5.2)
SODIUM BLD-SCNC: 141 MMOL/L (ref 136–145)
TROPONIN T SERPL-MCNC: <0.01 NG/ML (ref 0–0.03)
WBC NRBC COR # BLD: 6.25 10*3/MM3 (ref 4.5–10.7)
WHOLE BLOOD HOLD SPECIMEN: NORMAL
WHOLE BLOOD HOLD SPECIMEN: NORMAL

## 2018-02-22 PROCEDURE — 84484 ASSAY OF TROPONIN QUANT: CPT | Performed by: EMERGENCY MEDICINE

## 2018-02-22 PROCEDURE — 85025 COMPLETE CBC W/AUTO DIFF WBC: CPT | Performed by: EMERGENCY MEDICINE

## 2018-02-22 PROCEDURE — 93005 ELECTROCARDIOGRAM TRACING: CPT | Performed by: EMERGENCY MEDICINE

## 2018-02-22 PROCEDURE — 83880 ASSAY OF NATRIURETIC PEPTIDE: CPT | Performed by: EMERGENCY MEDICINE

## 2018-02-22 PROCEDURE — 99284 EMERGENCY DEPT VISIT MOD MDM: CPT

## 2018-02-22 PROCEDURE — 93010 ELECTROCARDIOGRAM REPORT: CPT | Performed by: INTERNAL MEDICINE

## 2018-02-22 PROCEDURE — 80053 COMPREHEN METABOLIC PANEL: CPT | Performed by: EMERGENCY MEDICINE

## 2018-02-22 PROCEDURE — 71046 X-RAY EXAM CHEST 2 VIEWS: CPT

## 2018-02-22 PROCEDURE — 93970 EXTREMITY STUDY: CPT

## 2018-02-22 RX ORDER — SODIUM CHLORIDE 0.9 % (FLUSH) 0.9 %
10 SYRINGE (ML) INJECTION AS NEEDED
Status: DISCONTINUED | OUTPATIENT
Start: 2018-02-22 | End: 2018-02-23 | Stop reason: HOSPADM

## 2018-02-23 LAB
BH CV LOWER VASCULAR LEFT COMMON FEMORAL AUGMENT: NORMAL
BH CV LOWER VASCULAR LEFT COMMON FEMORAL COMPETENT: NORMAL
BH CV LOWER VASCULAR LEFT COMMON FEMORAL COMPRESS: NORMAL
BH CV LOWER VASCULAR LEFT COMMON FEMORAL PHASIC: NORMAL
BH CV LOWER VASCULAR LEFT COMMON FEMORAL SPONT: NORMAL
BH CV LOWER VASCULAR LEFT DISTAL FEMORAL COMPRESS: NORMAL
BH CV LOWER VASCULAR LEFT GASTRONEMIUS COMPRESS: NORMAL
BH CV LOWER VASCULAR LEFT GREATER SAPH AK COMPRESS: NORMAL
BH CV LOWER VASCULAR LEFT GREATER SAPH BK COMPRESS: NORMAL
BH CV LOWER VASCULAR LEFT MID FEMORAL AUGMENT: NORMAL
BH CV LOWER VASCULAR LEFT MID FEMORAL COMPETENT: NORMAL
BH CV LOWER VASCULAR LEFT MID FEMORAL COMPRESS: NORMAL
BH CV LOWER VASCULAR LEFT MID FEMORAL PHASIC: NORMAL
BH CV LOWER VASCULAR LEFT MID FEMORAL SPONT: NORMAL
BH CV LOWER VASCULAR LEFT PERONEAL COMPRESS: NORMAL
BH CV LOWER VASCULAR LEFT POPLITEAL AUGMENT: NORMAL
BH CV LOWER VASCULAR LEFT POPLITEAL COMPETENT: NORMAL
BH CV LOWER VASCULAR LEFT POPLITEAL COMPRESS: NORMAL
BH CV LOWER VASCULAR LEFT POPLITEAL PHASIC: NORMAL
BH CV LOWER VASCULAR LEFT POPLITEAL SPONT: NORMAL
BH CV LOWER VASCULAR LEFT POSTERIOR TIBIAL COMPRESS: NORMAL
BH CV LOWER VASCULAR LEFT PROXIMAL FEMORAL COMPRESS: NORMAL
BH CV LOWER VASCULAR LEFT SAPHENOFEMORAL JUNCTION AUGMENT: NORMAL
BH CV LOWER VASCULAR LEFT SAPHENOFEMORAL JUNCTION COMPETENT: NORMAL
BH CV LOWER VASCULAR LEFT SAPHENOFEMORAL JUNCTION COMPRESS: NORMAL
BH CV LOWER VASCULAR LEFT SAPHENOFEMORAL JUNCTION PHASIC: NORMAL
BH CV LOWER VASCULAR LEFT SAPHENOFEMORAL JUNCTION SPONT: NORMAL
BH CV LOWER VASCULAR RIGHT COMMON FEMORAL AUGMENT: NORMAL
BH CV LOWER VASCULAR RIGHT COMMON FEMORAL COMPETENT: NORMAL
BH CV LOWER VASCULAR RIGHT COMMON FEMORAL COMPRESS: NORMAL
BH CV LOWER VASCULAR RIGHT COMMON FEMORAL PHASIC: NORMAL
BH CV LOWER VASCULAR RIGHT COMMON FEMORAL SPONT: NORMAL
BH CV LOWER VASCULAR RIGHT DISTAL FEMORAL COMPRESS: NORMAL
BH CV LOWER VASCULAR RIGHT GASTRONEMIUS COMPRESS: NORMAL
BH CV LOWER VASCULAR RIGHT GREATER SAPH AK COMPRESS: NORMAL
BH CV LOWER VASCULAR RIGHT GREATER SAPH BK COMPRESS: NORMAL
BH CV LOWER VASCULAR RIGHT MID FEMORAL AUGMENT: NORMAL
BH CV LOWER VASCULAR RIGHT MID FEMORAL COMPETENT: NORMAL
BH CV LOWER VASCULAR RIGHT MID FEMORAL COMPRESS: NORMAL
BH CV LOWER VASCULAR RIGHT MID FEMORAL PHASIC: NORMAL
BH CV LOWER VASCULAR RIGHT MID FEMORAL SPONT: NORMAL
BH CV LOWER VASCULAR RIGHT PERONEAL COMPRESS: NORMAL
BH CV LOWER VASCULAR RIGHT POPLITEAL AUGMENT: NORMAL
BH CV LOWER VASCULAR RIGHT POPLITEAL COMPETENT: NORMAL
BH CV LOWER VASCULAR RIGHT POPLITEAL COMPRESS: NORMAL
BH CV LOWER VASCULAR RIGHT POPLITEAL PHASIC: NORMAL
BH CV LOWER VASCULAR RIGHT POPLITEAL SPONT: NORMAL
BH CV LOWER VASCULAR RIGHT POSTERIOR TIBIAL COMPRESS: NORMAL
BH CV LOWER VASCULAR RIGHT PROXIMAL FEMORAL COMPRESS: NORMAL
BH CV LOWER VASCULAR RIGHT SAPHENOFEMORAL JUNCTION AUGMENT: NORMAL
BH CV LOWER VASCULAR RIGHT SAPHENOFEMORAL JUNCTION COMPETENT: NORMAL
BH CV LOWER VASCULAR RIGHT SAPHENOFEMORAL JUNCTION COMPRESS: NORMAL
BH CV LOWER VASCULAR RIGHT SAPHENOFEMORAL JUNCTION PHASIC: NORMAL
BH CV LOWER VASCULAR RIGHT SAPHENOFEMORAL JUNCTION SPONT: NORMAL

## 2018-02-23 NOTE — ED NOTES
Pt discharged to [facility], pt friend sayda on her way, said she is less that 15 minutes away.     Lisa Arthur RN  02/22/18 0712

## 2018-02-23 NOTE — PROGRESS NOTES
BLEV doppler completed.  Preliminary results:  Negative for DVT in bilateral lower extremities.  Results given to Dr. Graham.

## 2018-02-23 NOTE — ED PROVIDER NOTES
EMERGENCY DEPARTMENT ENCOUNTER    CHIEF COMPLAINT  Chief Complaint: BLE swelling  History given by: Patient  History limited by: None  Room Number: 43/43  PMD: Waqar Melissa MD      HPI:  Pt is a 89 y.o. female who presents complaining of increased BLE swelling which was first noticed 1 week ago. She has been on myrbetriq for 10 weeks prior to starting enablex today. She reports she has taken enablex in the past with no complications. Patient also c/o sinus congestion which she notes was reported as a side effect of myrbetriq. There are no c/o decreased urination or other sx at this time.     Duration:  1 week  Onset: gradual  Timing: constant  Location: BLE  Radiation: none stated  Quality: swelling  Intensity/Severity: moderate  Progression: increased  Associated Symptoms: sinus congestion  Aggravating Factors: none stated  Alleviating Factors: none stated  Previous Episodes: none stated  Treatment before arrival:  She has been on myrbetriq for 10 weeks prior to starting enablex today. She reports she has taken enablex in the past with no complications.    PAST MEDICAL HISTORY  Active Ambulatory Problems     Diagnosis Date Noted   • Abdominal pain, vomiting, and diarrhea 03/16/2016   • Chronic constipation 03/16/2016   • Hemorrhoid 03/16/2016   • BP (high blood pressure) 03/16/2016   • Arthralgia of hip 03/16/2016   • LBP (low back pain) 03/16/2016   • Disease of thyroid gland 03/16/2016   • Benign essential HTN 05/28/2017   • Chronic kidney disease, stage III (moderate) 05/28/2017   • Debility 05/28/2017   • Chronic systolic CHF (congestive heart failure) 05/28/2017   • Coronary artery disease 05/29/2017     Resolved Ambulatory Problems     Diagnosis Date Noted   • Colitis presumed infectious (bacterial) 05/28/2017   • Rectal bleeding 05/29/2017   • Abnormal finding in urine 05/29/2017     Past Medical History:   Diagnosis Date   • Arthritis    • CHF (congestive heart failure)    • Coronary artery  disease    • Disease of thyroid gland 3/16/2016   • Diverticulitis    • Hypertension        PAST SURGICAL HISTORY  Past Surgical History:   Procedure Laterality Date   • ABDOMINAL SURGERY      liban   • APPENDECTOMY     • COLON SURGERY      fistulectomy   • EYE SURGERY      cataract   • HYSTERECTOMY     • TONSILLECTOMY         FAMILY HISTORY  Family History   Problem Relation Age of Onset   • Hypertension Other        SOCIAL HISTORY  Social History     Social History   • Marital status: Single     Spouse name: N/A   • Number of children: N/A   • Years of education: N/A     Occupational History   • Not on file.     Social History Main Topics   • Smoking status: Never Smoker   • Smokeless tobacco: Not on file   • Alcohol use No   • Drug use: No   • Sexual activity: Defer     Other Topics Concern   • Not on file     Social History Narrative       ALLERGIES  Amoxicillin; Cortisone; Diazepam; Erythromycin; Horse-derived products; Lipitor [atorvastatin]; Levoxyl  [levothyroxine sodium]; Lexapro  [escitalopram oxalate]; Lopressor  [metoprolol tartrate]; Metaxalone; Omeprazole; Penicillins; Sesame seed (diagnostic); Solarcaine [benzocaine]; and Sulfa antibiotics    REVIEW OF SYSTEMS  Review of Systems   Constitutional: Negative for fever.   HENT: Positive for congestion (sinus). Negative for sore throat.    Respiratory: Negative for cough and shortness of breath.    Cardiovascular: Positive for leg swelling (BLE). Negative for chest pain.   Gastrointestinal: Negative for abdominal pain, diarrhea and vomiting.   Genitourinary: Negative for dysuria.   Musculoskeletal: Negative for neck pain.   Skin: Negative for rash.   Neurological: Negative for weakness, numbness and headaches.   All other systems reviewed and are negative.      PHYSICAL EXAM  ED Triage Vitals   Temp Heart Rate Resp BP SpO2   02/22/18 1855 02/22/18 1855 02/22/18 1855 02/22/18 1855 02/22/18 1855   98.3 °F (36.8 °C) 79 18 208/86 100 %      Temp src Heart Rate  Source Patient Position BP Location FiO2 (%)   02/22/18 1855 -- -- -- --   Oral           Physical Exam   Constitutional: She is oriented to person, place, and time and well-developed, well-nourished, and in no distress. No distress.   Patient is hard of hearing   HENT:   Head: Normocephalic and atraumatic.   Eyes: EOM are normal. Pupils are equal, round, and reactive to light.   Neck: Normal range of motion. Neck supple.   Cardiovascular: Normal rate, regular rhythm and normal heart sounds.    Pulmonary/Chest: Effort normal and breath sounds normal. No respiratory distress.   Abdominal: Soft. There is no tenderness. There is no rebound and no guarding.   Musculoskeletal: Normal range of motion. She exhibits edema (mild bilateral pedal edema).   Neurological: She is alert and oriented to person, place, and time. She has normal sensation and normal strength.   Skin: Skin is warm and dry. No rash noted.   Psychiatric: Mood and affect normal.   Nursing note and vitals reviewed.      LAB RESULTS  Lab Results (last 24 hours)     Procedure Component Value Units Date/Time    CBC & Differential [[id number]] Collected:  02/22/18 1913    Specimen:  Blood Updated:  02/22/18 1927    Narrative:       The following orders were created for panel order CBC & Differential.  Procedure                               Abnormality         Status                     ---------                               -----------         ------                     CBC Auto Differential[[id number]]        Abnormal            Final result                 Please view results for these tests on the individual orders.    Comprehensive Metabolic Panel [[id number]]  (Abnormal) Collected:  02/22/18 1913    Specimen:  Blood Updated:  02/22/18 2010     Glucose 98 mg/dL      BUN 33 (H) mg/dL      Creatinine 0.92 mg/dL      Sodium 141 mmol/L      Potassium 4.4 mmol/L      Chloride 102 mmol/L      CO2 26.4 mmol/L      Calcium 9.8 (H) mg/dL      Total Protein 7.1 g/dL       Albumin 3.80 g/dL      ALT (SGPT) 17 U/L      AST (SGOT) 21 U/L      Alkaline Phosphatase 53 U/L      Total Bilirubin 0.4 mg/dL      eGFR Non African Amer 57 (L) mL/min/1.73      Globulin 3.3 gm/dL      A/G Ratio 1.2 g/dL      BUN/Creatinine Ratio 35.9 (H)     Anion Gap 12.6 mmol/L     Narrative:       The MDRD GFR formula is only valid for adults with stable renal function between ages 18 and 70.    BNP [[id number]]  (Normal) Collected:  02/22/18 1913    Specimen:  Blood Updated:  02/22/18 2008     proBNP 1452.0 pg/mL     Narrative:       Among patients with dyspnea, NT-proBNP is highly sensitive for the detection of acute congestive heart failure. In addition NT-proBNP of <300 pg/ml effectively rules out acute congestive heart failure with 99% negative predictive value.    Troponin [[id number]]  (Normal) Collected:  02/22/18 1913    Specimen:  Blood Updated:  02/22/18 2010     Troponin T <0.010 ng/mL     Narrative:       Troponin T Reference Ranges:  Less than 0.03 ng/mL:    Negative for AMI  0.03 to 0.09 ng/mL:      Indeterminant for AMI  Greater than 0.09 ng/mL: Positive for AMI    CBC Auto Differential [[id number]]  (Abnormal) Collected:  02/22/18 1913    Specimen:  Blood Updated:  02/22/18 1927     WBC 6.25 10*3/mm3      RBC 4.55 10*6/mm3      Hemoglobin 13.0 g/dL      Hematocrit 41.2 %      MCV 90.5 fL      MCH 28.6 pg      MCHC 31.6 (L) g/dL      RDW 15.3 (H) %      RDW-SD 50.8 fl      MPV 11.2 fL      Platelets 186 10*3/mm3      Neutrophil % 60.0 %      Lymphocyte % 29.6 %      Monocyte % 8.0 %      Eosinophil % 1.9 %      Basophil % 0.5 %      Immature Grans % 0.0 %      Neutrophils, Absolute 3.75 10*3/mm3      Lymphocytes, Absolute 1.85 10*3/mm3      Monocytes, Absolute 0.50 10*3/mm3      Eosinophils, Absolute 0.12 10*3/mm3      Basophils, Absolute 0.03 10*3/mm3      Immature Grans, Absolute 0.00 10*3/mm3           I ordered the above labs and reviewed the results    RADIOLOGY  XR Chest 2 View   Final  Result   FINDINGS: The ovoid area of increased density superimposing the right  heart on the previous examination has diminished considerably in size  and degree of opacity. This compatible with decreasing atelectasis or  consolidation. There has been no interval change in the appearance of  the right hilum. There is mild stable cardiac enlargement. There appears  to be a left breast implant in place.     No pleural effusion vascular congestion or acute airspace disease has  developed. Calcified benign granuloma at the right apex reidentified.  The remainder is unremarkable.     This report was finalized on 2/22/2018 7:53 PM by Dr. Fco Myrick MD.        DOPPLER BLE  NEGATIVE FOR DVT        I ordered the above noted radiological studies. Interpreted by radiologist. Reviewed by me in PACS.       PROCEDURES  Procedures  EKG           EKG time: 2030  Rhythm/Rate: NSR rate85 frequent PVCs  P waves and W[place]: nml  QRS, axis: nml   ST and T waves: nonspecific wave changes     Interpreted Contemporaneously by me, independently viewed  unchanged compared to prior 06/04/17      PROGRESS AND CONSULTS  ED Course   8:44 PM   Ordered doppler to r/o DVT.     10:29 PM  Rechecked patient who is resting in NAD. Informed patient of doppler results which r/o DVT. Advised her to rest with her legs elevated. Plan to d/c was discussed. Pt understands and agrees with the plan. All questions answered.      MEDICAL DECISION MAKING  Results were reviewed/discussed with the patient and they were also made aware of online access. Pt also made aware that some labs, such as cultures, will not be resulted during ER visit and follow up with PMD is necessary.     MDM  Number of Diagnoses or Management Options  Lower extremity edema:      Amount and/or Complexity of Data Reviewed  Clinical lab tests: reviewed and ordered (Lab results are unremarkable)  Tests in the radiology section of CPT®: reviewed and ordered (CXR is unremarkable; doppler is  negative)  Tests in the medicine section of CPT®: reviewed and ordered (EKG - See EKG procedure note)  Decide to obtain previous medical records or to obtain history from someone other than the patient: yes  Independent visualization of images, tracings, or specimens: yes    Patient Progress  Patient progress: stable         DIAGNOSIS  Final diagnoses:   Lower extremity edema       DISPOSITION  DISCHARGE    Patient discharged in stable condition.    Reviewed implications of results, diagnosis, meds, responsibility to follow up, warning signs and symptoms of possible worsening, potential complications and reasons to return to ER, including new or worsening sx.    Patient/Family voiced understanding of above instructions.    Discussed plan for discharge, as there is no emergent indication for admission.  Pt/family is agreeable and understands need for follow up and repeat testing.  Pt is aware that discharge does not mean that nothing is wrong but it indicates no emergency is present that requires admission and they must continue care with follow-up as given below or physician of their choice.     FOLLOW-UP  Waqar Melissa MD  [address]  [address]  [place] [id number] [phone]    Schedule an appointment as soon as possible for a visit      [facility]  [address] [facility] [address] [phone]    If symptoms worsen         Medication List      Stop          clonazePAM 0.5 MG tablet   Commonly known as:  KlonoPIN       doxycycline 100 MG capsule   Commonly known as:  MONODOX       LOSARTAN POTASSIUM PO       oxybutynin 5 MG tablet   Commonly known as:  DITROPAN       sulfamethoxazole-trimethoprim 800-160 MG per tablet   Commonly known as:  BACTRIM DS               Latest Documented Vital Signs:  As of 2:32 AM  BP- 160/78 HR- 75 Temp- 98.3 °F (36.8 °C) (Oral) O2 sat- 98%    --  Documentation assistance provided by henrique Alegria for   [facility].  Information recorded by the scribe was done at my direction and has been verified and validated by me.       Minda Alegria  02/22/18 1455       Charles Graham MD  02/23/18 [id number]

## 2018-04-21 ENCOUNTER — HOSPITAL ENCOUNTER (EMERGENCY)
Facility: HOSPITAL | Age: 83
Discharge: HOME | End: 2018-04-21
Attending: EMERGENCY MEDICINE | Admitting: EMERGENCY MEDICINE

## 2018-04-21 ENCOUNTER — APPOINTMENT (OUTPATIENT)
Dept: GENERAL RADIOLOGY | Facility: HOSPITAL | Age: 83
End: 2018-04-21

## 2018-04-21 VITALS
SYSTOLIC BLOOD PRESSURE: 170 MMHG | RESPIRATION RATE: 20 BRPM | OXYGEN SATURATION: 96 % | HEART RATE: 73 BPM | DIASTOLIC BLOOD PRESSURE: 78 MMHG | WEIGHT: 123 LBS | TEMPERATURE: 99.8 F | BODY MASS INDEX: 21 KG/M2 | HEIGHT: 64 IN

## 2018-04-21 DIAGNOSIS — J20.9 ACUTE BRONCHITIS, UNSPECIFIED ORGANISM: Primary | ICD-10-CM

## 2018-04-21 LAB
ALBUMIN SERPL-MCNC: 3.8 G/DL (ref 3.5–5.2)
ALBUMIN/GLOB SERPL: 1.2 G/DL
ALP SERPL-CCNC: 55 U/L (ref 39–117)
ALT SERPL W P-5'-P-CCNC: 16 U/L (ref 1–33)
ANION GAP SERPL CALCULATED.3IONS-SCNC: 12.4 MMOL/L
AST SERPL-CCNC: 24 U/L (ref 1–32)
BASOPHILS # BLD AUTO: 0.02 10*3/MM3 (ref 0–0.2)
BASOPHILS NFR BLD AUTO: 0.3 % (ref 0–1.5)
BILIRUB SERPL-MCNC: 0.6 MG/DL (ref 0.1–1.2)
BUN BLD-MCNC: 22 MG/DL (ref 8–23)
BUN/CREAT SERPL: 17.6 (ref 7–25)
CALCIUM SPEC-SCNC: 9.2 MG/DL (ref 8.2–9.6)
CHLORIDE SERPL-SCNC: 99 MMOL/L (ref 98–107)
CO2 SERPL-SCNC: 27.6 MMOL/L (ref 22–29)
CREAT BLD-MCNC: 1.25 MG/DL (ref 0.57–1)
D-LACTATE SERPL-SCNC: 0.9 MMOL/L (ref 0.5–2)
DEPRECATED RDW RBC AUTO: 50.6 FL (ref 37–54)
EOSINOPHIL # BLD AUTO: 0.11 10*3/MM3 (ref 0–0.7)
EOSINOPHIL NFR BLD AUTO: 1.4 % (ref 0.3–6.2)
ERYTHROCYTE [DISTWIDTH] IN BLOOD BY AUTOMATED COUNT: 15.4 % (ref 11.7–13)
FLUAV AG NPH QL: NEGATIVE
FLUBV AG NPH QL IA: NEGATIVE
GFR SERPL CREATININE-BSD FRML MDRD: 40 ML/MIN/1.73
GLOBULIN UR ELPH-MCNC: 3.3 GM/DL
GLUCOSE BLD-MCNC: 101 MG/DL (ref 65–99)
HCT VFR BLD AUTO: 42.8 % (ref 35.6–45.5)
HGB BLD-MCNC: 13.6 G/DL (ref 11.9–15.5)
IMM GRANULOCYTES # BLD: 0 10*3/MM3 (ref 0–0.03)
IMM GRANULOCYTES NFR BLD: 0 % (ref 0–0.5)
LYMPHOCYTES # BLD AUTO: 1.26 10*3/MM3 (ref 0.9–4.8)
LYMPHOCYTES NFR BLD AUTO: 16.3 % (ref 19.6–45.3)
MCH RBC QN AUTO: 28.5 PG (ref 26.9–32)
MCHC RBC AUTO-ENTMCNC: 31.8 G/DL (ref 32.4–36.3)
MCV RBC AUTO: 89.7 FL (ref 80.5–98.2)
MONOCYTES # BLD AUTO: 0.6 10*3/MM3 (ref 0.2–1.2)
MONOCYTES NFR BLD AUTO: 7.8 % (ref 5–12)
NEUTROPHILS # BLD AUTO: 5.75 10*3/MM3 (ref 1.9–8.1)
NEUTROPHILS NFR BLD AUTO: 74.2 % (ref 42.7–76)
NT-PROBNP SERPL-MCNC: 2259 PG/ML (ref 0–1800)
PLATELET # BLD AUTO: 176 10*3/MM3 (ref 140–500)
PMV BLD AUTO: 11.5 FL (ref 6–12)
POTASSIUM BLD-SCNC: 4.4 MMOL/L (ref 3.5–5.2)
PROT SERPL-MCNC: 7.1 G/DL (ref 6–8.5)
RBC # BLD AUTO: 4.77 10*6/MM3 (ref 3.9–5.2)
S PYO AG THROAT QL: NEGATIVE
SODIUM BLD-SCNC: 139 MMOL/L (ref 136–145)
WBC NRBC COR # BLD: 7.74 10*3/MM3 (ref 4.5–10.7)

## 2018-04-21 PROCEDURE — 85025 COMPLETE CBC W/AUTO DIFF WBC: CPT | Performed by: NURSE PRACTITIONER

## 2018-04-21 PROCEDURE — 71046 X-RAY EXAM CHEST 2 VIEWS: CPT

## 2018-04-21 PROCEDURE — 83605 ASSAY OF LACTIC ACID: CPT | Performed by: NURSE PRACTITIONER

## 2018-04-21 PROCEDURE — 80053 COMPREHEN METABOLIC PANEL: CPT | Performed by: NURSE PRACTITIONER

## 2018-04-21 PROCEDURE — 93010 ELECTROCARDIOGRAM REPORT: CPT | Performed by: INTERNAL MEDICINE

## 2018-04-21 PROCEDURE — 87804 INFLUENZA ASSAY W/OPTIC: CPT | Performed by: NURSE PRACTITIONER

## 2018-04-21 PROCEDURE — 93005 ELECTROCARDIOGRAM TRACING: CPT | Performed by: NURSE PRACTITIONER

## 2018-04-21 PROCEDURE — 87880 STREP A ASSAY W/OPTIC: CPT | Performed by: NURSE PRACTITIONER

## 2018-04-21 PROCEDURE — 94640 AIRWAY INHALATION TREATMENT: CPT

## 2018-04-21 PROCEDURE — 87040 BLOOD CULTURE FOR BACTERIA: CPT | Performed by: NURSE PRACTITIONER

## 2018-04-21 PROCEDURE — 87081 CULTURE SCREEN ONLY: CPT | Performed by: NURSE PRACTITIONER

## 2018-04-21 PROCEDURE — 94799 UNLISTED PULMONARY SVC/PX: CPT

## 2018-04-21 PROCEDURE — 99284 EMERGENCY DEPT VISIT MOD MDM: CPT

## 2018-04-21 PROCEDURE — 83880 ASSAY OF NATRIURETIC PEPTIDE: CPT | Performed by: NURSE PRACTITIONER

## 2018-04-21 RX ORDER — ALBUTEROL SULFATE 2.5 MG/3ML
2.5 SOLUTION RESPIRATORY (INHALATION) ONCE
Status: COMPLETED | OUTPATIENT
Start: 2018-04-21 | End: 2018-04-21

## 2018-04-21 RX ORDER — LEVOFLOXACIN 500 MG/1
500 TABLET, FILM COATED ORAL DAILY
Qty: 5 TABLET | Refills: 0 | Status: SHIPPED | OUTPATIENT
Start: 2018-04-21 | End: 2018-04-26

## 2018-04-21 RX ORDER — ALBUTEROL SULFATE 90 UG/1
2 AEROSOL, METERED RESPIRATORY (INHALATION) EVERY 6 HOURS PRN
Qty: 1 INHALER | Refills: 0 | Status: SHIPPED | OUTPATIENT
Start: 2018-04-21 | End: 2018-05-29

## 2018-04-21 RX ORDER — PREDNISONE 20 MG/1
40 TABLET ORAL DAILY
Qty: 10 TABLET | Refills: 0 | Status: SHIPPED | OUTPATIENT
Start: 2018-04-21 | End: 2018-04-26

## 2018-04-21 RX ORDER — SODIUM CHLORIDE 0.9 % (FLUSH) 0.9 %
10 SYRINGE (ML) INJECTION AS NEEDED
Status: DISCONTINUED | OUTPATIENT
Start: 2018-04-21 | End: 2018-04-21 | Stop reason: HOSPADM

## 2018-04-21 RX ADMIN — ALBUTEROL SULFATE 2.5 MG: 2.5 SOLUTION RESPIRATORY (INHALATION) at 11:48

## 2018-04-21 NOTE — DISCHARGE INSTRUCTIONS
It is very important that you have follow up with your Primary Care Provider in 2 days    Return Precautions    Although you are being discharged from the ED today, I encourage you to return for worsening symptoms.  Things can, and do, change such that treatment at home with medication may not be adequate.      Specifically, return for any of the following:    Chest pain, shortness of breath, pain or nausea and vomiting not controlled by medications provided.    Please make a follow up with your Primary Care Provider for a blood pressure recheck.

## 2018-04-21 NOTE — ED PROVIDER NOTES
MD ATTESTATION NOTE    Patient reports to ED complaining of active cough and sore throat x1 week. On exam, patient shows A&O x 3, mild respiratory distress, and faint wheezing bilaterally.  Discussed plan for further evaluation with blood test, EKG, and CXR for further evaluation. RA sats of 97%    EKG    EKG time: 1157  Rhythm/Rate: NML 69  No Acute Ischemia  Non-Specific ST-T changes    CXR negative    Unchanged compared to prior on 2/22/2018    Interpreted Contemporaneously by me.  Independently viewed by me    The RASHI and I have discussed this patient's history, physical exam, and treatment plan.  I have reviewed the documentation and personally had a face to face interaction with the patient. I affirm the documentation and agree with the treatment and plan.  The attached note describes my personal findings.    Documentation assistance provided by henrique Palacios and Minda Alegria for Dr. Valiente.  Information recorded by the henrique was done at my direction and has been verified and validated by me.          Viktoria Palacios  04/21/18 7605       Clemente Valiente MD  04/21/18 1727

## 2018-04-23 LAB — BACTERIA SPEC AEROBE CULT: NORMAL

## 2018-04-26 ENCOUNTER — HOSPITAL ENCOUNTER (EMERGENCY)
Facility: HOSPITAL | Age: 83
Discharge: HOME | End: 2018-04-26
Attending: EMERGENCY MEDICINE | Admitting: EMERGENCY MEDICINE

## 2018-04-26 VITALS
RESPIRATION RATE: 18 BRPM | HEIGHT: 64 IN | WEIGHT: 123 LBS | SYSTOLIC BLOOD PRESSURE: 165 MMHG | DIASTOLIC BLOOD PRESSURE: 73 MMHG | OXYGEN SATURATION: 100 % | HEART RATE: 73 BPM | BODY MASS INDEX: 21 KG/M2 | TEMPERATURE: 98.3 F

## 2018-04-26 DIAGNOSIS — F41.0 PANIC ATTACK: Primary | ICD-10-CM

## 2018-04-26 LAB
ANION GAP SERPL CALCULATED.3IONS-SCNC: 10.5 MMOL/L
BACTERIA SPEC AEROBE CULT: NORMAL
BACTERIA SPEC AEROBE CULT: NORMAL
BASOPHILS # BLD AUTO: 0.01 10*3/MM3 (ref 0–0.2)
BASOPHILS NFR BLD AUTO: 0.1 % (ref 0–1.5)
BILIRUB UR QL STRIP: NEGATIVE
BUN BLD-MCNC: 36 MG/DL (ref 8–23)
BUN/CREAT SERPL: 29.5 (ref 7–25)
CALCIUM SPEC-SCNC: 9.5 MG/DL (ref 8.2–9.6)
CHLORIDE SERPL-SCNC: 101 MMOL/L (ref 98–107)
CLARITY UR: CLEAR
CO2 SERPL-SCNC: 24.5 MMOL/L (ref 22–29)
COLOR UR: YELLOW
CREAT BLD-MCNC: 1.22 MG/DL (ref 0.57–1)
DEPRECATED RDW RBC AUTO: 47.3 FL (ref 37–54)
EOSINOPHIL # BLD AUTO: 0 10*3/MM3 (ref 0–0.7)
EOSINOPHIL NFR BLD AUTO: 0 % (ref 0.3–6.2)
ERYTHROCYTE [DISTWIDTH] IN BLOOD BY AUTOMATED COUNT: 14.9 % (ref 11.7–13)
GFR SERPL CREATININE-BSD FRML MDRD: 41 ML/MIN/1.73
GLUCOSE BLD-MCNC: 139 MG/DL (ref 65–99)
GLUCOSE UR STRIP-MCNC: NEGATIVE MG/DL
HCT VFR BLD AUTO: 42.9 % (ref 35.6–45.5)
HGB BLD-MCNC: 14.1 G/DL (ref 11.9–15.5)
HGB UR QL STRIP.AUTO: NEGATIVE
IMM GRANULOCYTES # BLD: 0.03 10*3/MM3 (ref 0–0.03)
IMM GRANULOCYTES NFR BLD: 0.4 % (ref 0–0.5)
KETONES UR QL STRIP: NEGATIVE
LEUKOCYTE ESTERASE UR QL STRIP.AUTO: NEGATIVE
LYMPHOCYTES # BLD AUTO: 1.04 10*3/MM3 (ref 0.9–4.8)
LYMPHOCYTES NFR BLD AUTO: 12.3 % (ref 19.6–45.3)
MCH RBC QN AUTO: 28.7 PG (ref 26.9–32)
MCHC RBC AUTO-ENTMCNC: 32.9 G/DL (ref 32.4–36.3)
MCV RBC AUTO: 87.4 FL (ref 80.5–98.2)
MONOCYTES # BLD AUTO: 0.13 10*3/MM3 (ref 0.2–1.2)
MONOCYTES NFR BLD AUTO: 1.5 % (ref 5–12)
NEUTROPHILS # BLD AUTO: 7.28 10*3/MM3 (ref 1.9–8.1)
NEUTROPHILS NFR BLD AUTO: 86.1 % (ref 42.7–76)
NITRITE UR QL STRIP: NEGATIVE
PH UR STRIP.AUTO: 6 [PH] (ref 5–8)
PLATELET # BLD AUTO: 243 10*3/MM3 (ref 140–500)
PMV BLD AUTO: 11.8 FL (ref 6–12)
POTASSIUM BLD-SCNC: 4.4 MMOL/L (ref 3.5–5.2)
PROT UR QL STRIP: NEGATIVE
RBC # BLD AUTO: 4.91 10*6/MM3 (ref 3.9–5.2)
SODIUM BLD-SCNC: 136 MMOL/L (ref 136–145)
SP GR UR STRIP: 1.01 (ref 1–1.03)
UROBILINOGEN UR QL STRIP: NORMAL
WBC NRBC COR # BLD: 8.46 10*3/MM3 (ref 4.5–10.7)

## 2018-04-26 PROCEDURE — 81003 URINALYSIS AUTO W/O SCOPE: CPT | Performed by: NURSE PRACTITIONER

## 2018-04-26 PROCEDURE — 85025 COMPLETE CBC W/AUTO DIFF WBC: CPT | Performed by: NURSE PRACTITIONER

## 2018-04-26 PROCEDURE — 80048 BASIC METABOLIC PNL TOTAL CA: CPT | Performed by: NURSE PRACTITIONER

## 2018-04-26 PROCEDURE — 36415 COLL VENOUS BLD VENIPUNCTURE: CPT

## 2018-04-26 PROCEDURE — 99284 EMERGENCY DEPT VISIT MOD MDM: CPT

## 2018-05-29 ENCOUNTER — HOSPITAL ENCOUNTER (EMERGENCY)
Facility: HOSPITAL | Age: 83
Discharge: HOME | End: 2018-05-29
Attending: EMERGENCY MEDICINE | Admitting: NURSE PRACTITIONER

## 2018-05-29 ENCOUNTER — APPOINTMENT (OUTPATIENT)
Dept: GENERAL RADIOLOGY | Facility: HOSPITAL | Age: 83
End: 2018-05-29

## 2018-05-29 ENCOUNTER — APPOINTMENT (OUTPATIENT)
Dept: CT IMAGING | Facility: HOSPITAL | Age: 83
End: 2018-05-29

## 2018-05-29 VITALS
RESPIRATION RATE: 18 BRPM | HEIGHT: 64 IN | HEART RATE: 64 BPM | BODY MASS INDEX: 22.02 KG/M2 | TEMPERATURE: 99.1 F | WEIGHT: 129 LBS | SYSTOLIC BLOOD PRESSURE: 182 MMHG | OXYGEN SATURATION: 98 % | DIASTOLIC BLOOD PRESSURE: 101 MMHG

## 2018-05-29 DIAGNOSIS — K59.00 CONSTIPATION, UNSPECIFIED CONSTIPATION TYPE: Primary | ICD-10-CM

## 2018-05-29 DIAGNOSIS — R10.13 EPIGASTRIC PAIN: ICD-10-CM

## 2018-05-29 LAB
ALBUMIN SERPL-MCNC: 3.9 G/DL (ref 3.5–5.2)
ALBUMIN/GLOB SERPL: 1.1 G/DL
ALP SERPL-CCNC: 60 U/L (ref 39–117)
ALT SERPL W P-5'-P-CCNC: 14 U/L (ref 1–33)
ANION GAP SERPL CALCULATED.3IONS-SCNC: 11.9 MMOL/L
AST SERPL-CCNC: 17 U/L (ref 1–32)
BASOPHILS # BLD AUTO: 0.04 10*3/MM3 (ref 0–0.2)
BASOPHILS NFR BLD AUTO: 0.5 % (ref 0–1.5)
BILIRUB SERPL-MCNC: 0.4 MG/DL (ref 0.1–1.2)
BUN BLD-MCNC: 27 MG/DL (ref 8–23)
BUN/CREAT SERPL: 26.2 (ref 7–25)
CALCIUM SPEC-SCNC: 10 MG/DL (ref 8.2–9.6)
CHLORIDE SERPL-SCNC: 100 MMOL/L (ref 98–107)
CO2 SERPL-SCNC: 26.1 MMOL/L (ref 22–29)
CREAT BLD-MCNC: 1.03 MG/DL (ref 0.57–1)
DEPRECATED RDW RBC AUTO: 48.7 FL (ref 37–54)
EOSINOPHIL # BLD AUTO: 0.16 10*3/MM3 (ref 0–0.7)
EOSINOPHIL NFR BLD AUTO: 2.2 % (ref 0.3–6.2)
ERYTHROCYTE [DISTWIDTH] IN BLOOD BY AUTOMATED COUNT: 15.2 % (ref 11.7–13)
GFR SERPL CREATININE-BSD FRML MDRD: 50 ML/MIN/1.73
GLOBULIN UR ELPH-MCNC: 3.5 GM/DL
GLUCOSE BLD-MCNC: 90 MG/DL (ref 65–99)
HCT VFR BLD AUTO: 41.3 % (ref 35.6–45.5)
HGB BLD-MCNC: 13.5 G/DL (ref 11.9–15.5)
HOLD SPECIMEN: NORMAL
HOLD SPECIMEN: NORMAL
IMM GRANULOCYTES # BLD: 0.02 10*3/MM3 (ref 0–0.03)
IMM GRANULOCYTES NFR BLD: 0.3 % (ref 0–0.5)
LIPASE SERPL-CCNC: 59 U/L (ref 13–60)
LYMPHOCYTES # BLD AUTO: 1.6 10*3/MM3 (ref 0.9–4.8)
LYMPHOCYTES NFR BLD AUTO: 21.5 % (ref 19.6–45.3)
MCH RBC QN AUTO: 28.8 PG (ref 26.9–32)
MCHC RBC AUTO-ENTMCNC: 32.7 G/DL (ref 32.4–36.3)
MCV RBC AUTO: 88.2 FL (ref 80.5–98.2)
MONOCYTES # BLD AUTO: 0.96 10*3/MM3 (ref 0.2–1.2)
MONOCYTES NFR BLD AUTO: 12.9 % (ref 5–12)
NEUTROPHILS # BLD AUTO: 4.68 10*3/MM3 (ref 1.9–8.1)
NEUTROPHILS NFR BLD AUTO: 62.9 % (ref 42.7–76)
PLATELET # BLD AUTO: 255 10*3/MM3 (ref 140–500)
PMV BLD AUTO: 10.8 FL (ref 6–12)
POTASSIUM BLD-SCNC: 4.4 MMOL/L (ref 3.5–5.2)
PROT SERPL-MCNC: 7.4 G/DL (ref 6–8.5)
RBC # BLD AUTO: 4.68 10*6/MM3 (ref 3.9–5.2)
SODIUM BLD-SCNC: 138 MMOL/L (ref 136–145)
TROPONIN T SERPL-MCNC: <0.01 NG/ML (ref 0–0.03)
WBC NRBC COR # BLD: 7.44 10*3/MM3 (ref 4.5–10.7)
WHOLE BLOOD HOLD SPECIMEN: NORMAL
WHOLE BLOOD HOLD SPECIMEN: NORMAL

## 2018-05-29 PROCEDURE — 93010 ELECTROCARDIOGRAM REPORT: CPT | Performed by: INTERNAL MEDICINE

## 2018-05-29 PROCEDURE — 80053 COMPREHEN METABOLIC PANEL: CPT | Performed by: EMERGENCY MEDICINE

## 2018-05-29 PROCEDURE — 93005 ELECTROCARDIOGRAM TRACING: CPT | Performed by: EMERGENCY MEDICINE

## 2018-05-29 PROCEDURE — 74176 CT ABD & PELVIS W/O CONTRAST: CPT

## 2018-05-29 PROCEDURE — 84484 ASSAY OF TROPONIN QUANT: CPT | Performed by: EMERGENCY MEDICINE

## 2018-05-29 PROCEDURE — 85025 COMPLETE CBC W/AUTO DIFF WBC: CPT | Performed by: EMERGENCY MEDICINE

## 2018-05-29 PROCEDURE — 83690 ASSAY OF LIPASE: CPT | Performed by: EMERGENCY MEDICINE

## 2018-05-29 PROCEDURE — 36415 COLL VENOUS BLD VENIPUNCTURE: CPT | Performed by: EMERGENCY MEDICINE

## 2018-05-29 PROCEDURE — 99283 EMERGENCY DEPT VISIT LOW MDM: CPT

## 2018-05-29 RX ORDER — POLYETHYLENE GLYCOL 3350 17 G/17G
17 POWDER, FOR SOLUTION ORAL DAILY PRN
COMMUNITY
End: 2019-07-27 | Stop reason: SDDI

## 2018-05-29 RX ORDER — SODIUM CHLORIDE 0.9 % (FLUSH) 0.9 %
10 SYRINGE (ML) INJECTION AS NEEDED
Status: DISCONTINUED | OUTPATIENT
Start: 2018-05-29 | End: 2018-05-29 | Stop reason: HOSPADM

## 2019-07-27 ENCOUNTER — HOSPITAL ENCOUNTER (INPATIENT)
Facility: HOSPITAL | Age: 84
LOS: 4 days | Discharge: NURSING, REHAB OR LONG-TERM CARE | End: 2019-07-31
Attending: EMERGENCY MEDICINE | Admitting: HOSPITALIST

## 2019-07-27 ENCOUNTER — APPOINTMENT (OUTPATIENT)
Dept: CT IMAGING | Facility: HOSPITAL | Age: 84
End: 2019-07-27

## 2019-07-27 DIAGNOSIS — K52.9 COLITIS: Primary | ICD-10-CM

## 2019-07-27 DIAGNOSIS — R26.2 DIFFICULTY WALKING: ICD-10-CM

## 2019-07-27 LAB
ALBUMIN SERPL-MCNC: 4 G/DL (ref 3.5–5.2)
ALBUMIN/GLOB SERPL: 1.4 G/DL
ALP SERPL-CCNC: 65 U/L (ref 39–117)
ALT SERPL W P-5'-P-CCNC: 29 U/L (ref 1–33)
AMYLASE SERPL-CCNC: 122 U/L (ref 28–100)
ANION GAP SERPL CALCULATED.3IONS-SCNC: 16.6 MMOL/L (ref 5–15)
AST SERPL-CCNC: 37 U/L (ref 1–32)
BASOPHILS # BLD AUTO: 0.06 10*3/MM3 (ref 0–0.2)
BASOPHILS NFR BLD AUTO: 0.4 % (ref 0–1.5)
BILIRUB SERPL-MCNC: 0.7 MG/DL (ref 0.2–1.2)
BUN BLD-MCNC: 28 MG/DL (ref 8–23)
BUN/CREAT SERPL: 23.7 (ref 7–25)
CALCIUM SPEC-SCNC: 9.8 MG/DL (ref 8.2–9.6)
CHLORIDE SERPL-SCNC: 99 MMOL/L (ref 98–107)
CO2 SERPL-SCNC: 23.4 MMOL/L (ref 22–29)
CREAT BLD-MCNC: 1.18 MG/DL (ref 0.57–1)
DEPRECATED RDW RBC AUTO: 46.3 FL (ref 37–54)
EOSINOPHIL # BLD AUTO: 0.03 10*3/MM3 (ref 0–0.4)
EOSINOPHIL NFR BLD AUTO: 0.2 % (ref 0.3–6.2)
ERYTHROCYTE [DISTWIDTH] IN BLOOD BY AUTOMATED COUNT: 14.3 % (ref 12.3–15.4)
GFR SERPL CREATININE-BSD FRML MDRD: 42 ML/MIN/1.73
GLOBULIN UR ELPH-MCNC: 2.9 GM/DL
GLUCOSE BLD-MCNC: 134 MG/DL (ref 65–99)
HCT VFR BLD AUTO: 45.1 % (ref 34–46.6)
HGB BLD-MCNC: 14.8 G/DL (ref 12–15.9)
HOLD SPECIMEN: NORMAL
IMM GRANULOCYTES # BLD AUTO: 0.09 10*3/MM3 (ref 0–0.05)
IMM GRANULOCYTES NFR BLD AUTO: 0.6 % (ref 0–0.5)
LIPASE SERPL-CCNC: 43 U/L (ref 13–60)
LYMPHOCYTES # BLD AUTO: 1.1 10*3/MM3 (ref 0.7–3.1)
LYMPHOCYTES NFR BLD AUTO: 7.3 % (ref 19.6–45.3)
MCH RBC QN AUTO: 29 PG (ref 26.6–33)
MCHC RBC AUTO-ENTMCNC: 32.8 G/DL (ref 31.5–35.7)
MCV RBC AUTO: 88.3 FL (ref 79–97)
MONOCYTES # BLD AUTO: 1.05 10*3/MM3 (ref 0.1–0.9)
MONOCYTES NFR BLD AUTO: 7 % (ref 5–12)
NEUTROPHILS # BLD AUTO: 12.68 10*3/MM3 (ref 1.7–7)
NEUTROPHILS NFR BLD AUTO: 84.5 % (ref 42.7–76)
NRBC BLD AUTO-RTO: 0 /100 WBC (ref 0–0.2)
PLATELET # BLD AUTO: 238 10*3/MM3 (ref 140–450)
PMV BLD AUTO: 11.3 FL (ref 6–12)
POTASSIUM BLD-SCNC: 4.8 MMOL/L (ref 3.5–5.2)
PROT SERPL-MCNC: 6.9 G/DL (ref 6–8.5)
RBC # BLD AUTO: 5.11 10*6/MM3 (ref 3.77–5.28)
SODIUM BLD-SCNC: 139 MMOL/L (ref 136–145)
WBC NRBC COR # BLD: 15.01 10*3/MM3 (ref 3.4–10.8)
WHOLE BLOOD HOLD SPECIMEN: NORMAL

## 2019-07-27 PROCEDURE — 99285 EMERGENCY DEPT VISIT HI MDM: CPT

## 2019-07-27 PROCEDURE — 74177 CT ABD & PELVIS W/CONTRAST: CPT

## 2019-07-27 PROCEDURE — 85025 COMPLETE CBC W/AUTO DIFF WBC: CPT | Performed by: NURSE PRACTITIONER

## 2019-07-27 PROCEDURE — 25010000002 CEFTRIAXONE PER 250 MG: Performed by: NURSE PRACTITIONER

## 2019-07-27 PROCEDURE — G0378 HOSPITAL OBSERVATION PER HR: HCPCS

## 2019-07-27 PROCEDURE — 25010000002 ONDANSETRON PER 1 MG: Performed by: NURSE PRACTITIONER

## 2019-07-27 PROCEDURE — 80053 COMPREHEN METABOLIC PANEL: CPT | Performed by: NURSE PRACTITIONER

## 2019-07-27 PROCEDURE — 82150 ASSAY OF AMYLASE: CPT | Performed by: NURSE PRACTITIONER

## 2019-07-27 PROCEDURE — 83690 ASSAY OF LIPASE: CPT | Performed by: NURSE PRACTITIONER

## 2019-07-27 PROCEDURE — 25810000003 SODIUM CHLORIDE 0.9 % WITH KCL 20 MEQ 20-0.9 MEQ/L-% SOLUTION: Performed by: HOSPITALIST

## 2019-07-27 PROCEDURE — 25010000002 IOPAMIDOL 61 % SOLUTION: Performed by: EMERGENCY MEDICINE

## 2019-07-27 RX ORDER — SODIUM CHLORIDE 0.9 % (FLUSH) 0.9 %
3-10 SYRINGE (ML) INJECTION AS NEEDED
Status: DISCONTINUED | OUTPATIENT
Start: 2019-07-27 | End: 2019-07-31 | Stop reason: HOSPADM

## 2019-07-27 RX ORDER — ONDANSETRON 2 MG/ML
4 INJECTION INTRAMUSCULAR; INTRAVENOUS ONCE
Status: COMPLETED | OUTPATIENT
Start: 2019-07-27 | End: 2019-07-27

## 2019-07-27 RX ORDER — BISACODYL 5 MG/1
5 TABLET, DELAYED RELEASE ORAL DAILY PRN
Status: DISCONTINUED | OUTPATIENT
Start: 2019-07-27 | End: 2019-07-31 | Stop reason: HOSPADM

## 2019-07-27 RX ORDER — ONDANSETRON 4 MG/1
4 TABLET, FILM COATED ORAL EVERY 6 HOURS PRN
Status: DISCONTINUED | OUTPATIENT
Start: 2019-07-27 | End: 2019-07-31 | Stop reason: HOSPADM

## 2019-07-27 RX ORDER — CEFTRIAXONE SODIUM 1 G/50ML
1 INJECTION, SOLUTION INTRAVENOUS ONCE
Status: COMPLETED | OUTPATIENT
Start: 2019-07-27 | End: 2019-07-27

## 2019-07-27 RX ORDER — SODIUM CHLORIDE 0.9 % (FLUSH) 0.9 %
3 SYRINGE (ML) INJECTION EVERY 12 HOURS SCHEDULED
Status: DISCONTINUED | OUTPATIENT
Start: 2019-07-27 | End: 2019-07-31 | Stop reason: HOSPADM

## 2019-07-27 RX ORDER — BISACODYL 10 MG
10 SUPPOSITORY, RECTAL RECTAL DAILY PRN
Status: DISCONTINUED | OUTPATIENT
Start: 2019-07-27 | End: 2019-07-31 | Stop reason: HOSPADM

## 2019-07-27 RX ORDER — ONDANSETRON 2 MG/ML
4 INJECTION INTRAMUSCULAR; INTRAVENOUS EVERY 6 HOURS PRN
Status: DISCONTINUED | OUTPATIENT
Start: 2019-07-27 | End: 2019-07-31 | Stop reason: HOSPADM

## 2019-07-27 RX ORDER — SODIUM CHLORIDE 0.9 % (FLUSH) 0.9 %
10 SYRINGE (ML) INJECTION AS NEEDED
Status: DISCONTINUED | OUTPATIENT
Start: 2019-07-27 | End: 2019-07-31 | Stop reason: HOSPADM

## 2019-07-27 RX ORDER — ACETAMINOPHEN 325 MG/1
650 TABLET ORAL EVERY 4 HOURS PRN
Status: DISCONTINUED | OUTPATIENT
Start: 2019-07-27 | End: 2019-07-31 | Stop reason: HOSPADM

## 2019-07-27 RX ORDER — DARIFENACIN HYDROBROMIDE 7.5 MG/1
7.5 TABLET, EXTENDED RELEASE ORAL DAILY
COMMUNITY
End: 2019-12-28 | Stop reason: SDUPTHER

## 2019-07-27 RX ORDER — SODIUM CHLORIDE AND POTASSIUM CHLORIDE 150; 900 MG/100ML; MG/100ML
100 INJECTION, SOLUTION INTRAVENOUS CONTINUOUS
Status: DISCONTINUED | OUTPATIENT
Start: 2019-07-27 | End: 2019-07-29

## 2019-07-27 RX ADMIN — POTASSIUM CHLORIDE AND SODIUM CHLORIDE 100 ML/HR: 900; 150 INJECTION, SOLUTION INTRAVENOUS at 19:40

## 2019-07-27 RX ADMIN — METRONIDAZOLE 500 MG: 500 INJECTION, SOLUTION INTRAVENOUS at 22:00

## 2019-07-27 RX ADMIN — CEFTRIAXONE SODIUM 1 G: 1 INJECTION, SOLUTION INTRAVENOUS at 19:40

## 2019-07-27 RX ADMIN — IOPAMIDOL 85 ML: 612 INJECTION, SOLUTION INTRAVENOUS at 17:35

## 2019-07-27 RX ADMIN — SODIUM CHLORIDE 500 ML: 9 INJECTION, SOLUTION INTRAVENOUS at 16:57

## 2019-07-27 RX ADMIN — ONDANSETRON HYDROCHLORIDE 4 MG: 2 SOLUTION INTRAMUSCULAR; INTRAVENOUS at 16:40

## 2019-07-28 LAB
ANION GAP SERPL CALCULATED.3IONS-SCNC: 9.4 MMOL/L (ref 5–15)
BUN BLD-MCNC: 27 MG/DL (ref 8–23)
BUN/CREAT SERPL: 21.6 (ref 7–25)
CALCIUM SPEC-SCNC: 9 MG/DL (ref 8.2–9.6)
CHLORIDE SERPL-SCNC: 102 MMOL/L (ref 98–107)
CO2 SERPL-SCNC: 24.6 MMOL/L (ref 22–29)
CREAT BLD-MCNC: 1.25 MG/DL (ref 0.57–1)
DEPRECATED RDW RBC AUTO: 47.7 FL (ref 37–54)
ERYTHROCYTE [DISTWIDTH] IN BLOOD BY AUTOMATED COUNT: 14.7 % (ref 12.3–15.4)
GFR SERPL CREATININE-BSD FRML MDRD: 40 ML/MIN/1.73
GLUCOSE BLD-MCNC: 96 MG/DL (ref 65–99)
HCT VFR BLD AUTO: 37.9 % (ref 34–46.6)
HGB BLD-MCNC: 12 G/DL (ref 12–15.9)
MAGNESIUM SERPL-MCNC: 2.7 MG/DL (ref 1.7–2.3)
MCH RBC QN AUTO: 28.2 PG (ref 26.6–33)
MCHC RBC AUTO-ENTMCNC: 31.7 G/DL (ref 31.5–35.7)
MCV RBC AUTO: 89 FL (ref 79–97)
PHOSPHATE SERPL-MCNC: 4 MG/DL (ref 2.5–4.5)
PLATELET # BLD AUTO: 188 10*3/MM3 (ref 140–450)
PMV BLD AUTO: 11.1 FL (ref 6–12)
POTASSIUM BLD-SCNC: 4.7 MMOL/L (ref 3.5–5.2)
RBC # BLD AUTO: 4.26 10*6/MM3 (ref 3.77–5.28)
SODIUM BLD-SCNC: 136 MMOL/L (ref 136–145)
WBC NRBC COR # BLD: 9.1 10*3/MM3 (ref 3.4–10.8)

## 2019-07-28 PROCEDURE — 80048 BASIC METABOLIC PNL TOTAL CA: CPT | Performed by: HOSPITALIST

## 2019-07-28 PROCEDURE — 97162 PT EVAL MOD COMPLEX 30 MIN: CPT

## 2019-07-28 PROCEDURE — 36415 COLL VENOUS BLD VENIPUNCTURE: CPT | Performed by: HOSPITALIST

## 2019-07-28 PROCEDURE — 25010000002 CEFTRIAXONE PER 250 MG: Performed by: HOSPITALIST

## 2019-07-28 PROCEDURE — 85027 COMPLETE CBC AUTOMATED: CPT | Performed by: HOSPITALIST

## 2019-07-28 PROCEDURE — 83735 ASSAY OF MAGNESIUM: CPT | Performed by: HOSPITALIST

## 2019-07-28 PROCEDURE — 84100 ASSAY OF PHOSPHORUS: CPT | Performed by: HOSPITALIST

## 2019-07-28 PROCEDURE — G0378 HOSPITAL OBSERVATION PER HR: HCPCS

## 2019-07-28 RX ORDER — CHOLECALCIFEROL (VITAMIN D3) 125 MCG
500 CAPSULE ORAL DAILY
Status: DISCONTINUED | OUTPATIENT
Start: 2019-07-28 | End: 2019-07-31 | Stop reason: HOSPADM

## 2019-07-28 RX ORDER — SOLIFENACIN SUCCINATE 5 MG/1
5 TABLET, FILM COATED ORAL EVERY OTHER DAY
Status: DISCONTINUED | OUTPATIENT
Start: 2019-07-28 | End: 2019-07-31 | Stop reason: HOSPADM

## 2019-07-28 RX ORDER — SENNA AND DOCUSATE SODIUM 50; 8.6 MG/1; MG/1
2 TABLET, FILM COATED ORAL NIGHTLY
Status: DISCONTINUED | OUTPATIENT
Start: 2019-07-28 | End: 2019-07-31 | Stop reason: HOSPADM

## 2019-07-28 RX ORDER — ASPIRIN 81 MG/1
81 TABLET, CHEWABLE ORAL DAILY
Status: DISCONTINUED | OUTPATIENT
Start: 2019-07-28 | End: 2019-07-31 | Stop reason: HOSPADM

## 2019-07-28 RX ORDER — FUROSEMIDE 20 MG/1
20 TABLET ORAL DAILY
Status: DISCONTINUED | OUTPATIENT
Start: 2019-07-28 | End: 2019-07-31 | Stop reason: HOSPADM

## 2019-07-28 RX ORDER — CEFTRIAXONE SODIUM 1 G/50ML
1 INJECTION, SOLUTION INTRAVENOUS EVERY 24 HOURS
Status: DISCONTINUED | OUTPATIENT
Start: 2019-07-28 | End: 2019-07-29

## 2019-07-28 RX ORDER — CLONAZEPAM 1 MG/1
0.5 TABLET ORAL DAILY PRN
Status: DISCONTINUED | OUTPATIENT
Start: 2019-07-28 | End: 2019-07-31 | Stop reason: HOSPADM

## 2019-07-28 RX ORDER — LANOLIN ALCOHOL/MO/W.PET/CERES
100 CREAM (GRAM) TOPICAL DAILY
Status: DISCONTINUED | OUTPATIENT
Start: 2019-07-28 | End: 2019-07-31 | Stop reason: HOSPADM

## 2019-07-28 RX ORDER — OXYBUTYNIN CHLORIDE 5 MG/1
5 TABLET, EXTENDED RELEASE ORAL DAILY
Status: DISCONTINUED | OUTPATIENT
Start: 2019-07-28 | End: 2019-07-31 | Stop reason: HOSPADM

## 2019-07-28 RX ADMIN — FUROSEMIDE 20 MG: 20 TABLET ORAL at 09:56

## 2019-07-28 RX ADMIN — SOLIFENACIN SUCCINATE 5 MG: 5 TABLET, FILM COATED ORAL at 14:33

## 2019-07-28 RX ADMIN — ASPIRIN 81 MG: 81 TABLET, CHEWABLE ORAL at 09:56

## 2019-07-28 RX ADMIN — METRONIDAZOLE 500 MG: 500 INJECTION, SOLUTION INTRAVENOUS at 06:22

## 2019-07-28 RX ADMIN — SENNOSIDES AND DOCUSATE SODIUM 2 TABLET: 8.6; 5 TABLET ORAL at 09:55

## 2019-07-28 RX ADMIN — OXYBUTYNIN CHLORIDE 5 MG: 5 TABLET, EXTENDED RELEASE ORAL at 09:57

## 2019-07-28 RX ADMIN — Medication 500 MCG: at 09:55

## 2019-07-28 RX ADMIN — METRONIDAZOLE 500 MG: 500 INJECTION, SOLUTION INTRAVENOUS at 21:08

## 2019-07-28 RX ADMIN — METRONIDAZOLE 500 MG: 500 INJECTION, SOLUTION INTRAVENOUS at 14:32

## 2019-07-28 RX ADMIN — SODIUM CHLORIDE, PRESERVATIVE FREE 3 ML: 5 INJECTION INTRAVENOUS at 20:57

## 2019-07-28 RX ADMIN — CEFTRIAXONE SODIUM 1 G: 1 INJECTION, SOLUTION INTRAVENOUS at 18:17

## 2019-07-28 RX ADMIN — Medication 100 MG: at 09:55

## 2019-07-28 RX ADMIN — POLYETHYLENE GLYCOL 3350 17 G: 17 POWDER, FOR SOLUTION ORAL at 20:57

## 2019-07-29 ENCOUNTER — APPOINTMENT (OUTPATIENT)
Dept: GENERAL RADIOLOGY | Facility: HOSPITAL | Age: 84
End: 2019-07-29

## 2019-07-29 PROBLEM — H61.23 BILATERAL IMPACTED CERUMEN: Chronic | Status: ACTIVE | Noted: 2019-07-29

## 2019-07-29 LAB
ANION GAP SERPL CALCULATED.3IONS-SCNC: 9.7 MMOL/L (ref 5–15)
BASOPHILS # BLD AUTO: 0.04 10*3/MM3 (ref 0–0.2)
BASOPHILS NFR BLD AUTO: 0.6 % (ref 0–1.5)
BUN BLD-MCNC: 23 MG/DL (ref 8–23)
BUN/CREAT SERPL: 21.3 (ref 7–25)
CALCIUM SPEC-SCNC: 8.5 MG/DL (ref 8.2–9.6)
CHLORIDE SERPL-SCNC: 103 MMOL/L (ref 98–107)
CO2 SERPL-SCNC: 21.3 MMOL/L (ref 22–29)
CREAT BLD-MCNC: 1.08 MG/DL (ref 0.57–1)
DEPRECATED RDW RBC AUTO: 49.9 FL (ref 37–54)
EOSINOPHIL # BLD AUTO: 0.3 10*3/MM3 (ref 0–0.4)
EOSINOPHIL NFR BLD AUTO: 4.9 % (ref 0.3–6.2)
ERYTHROCYTE [DISTWIDTH] IN BLOOD BY AUTOMATED COUNT: 14.7 % (ref 12.3–15.4)
GFR SERPL CREATININE-BSD FRML MDRD: 47 ML/MIN/1.73
GLUCOSE BLD-MCNC: 95 MG/DL (ref 65–99)
HCT VFR BLD AUTO: 38.8 % (ref 34–46.6)
HGB BLD-MCNC: 12.2 G/DL (ref 12–15.9)
IMM GRANULOCYTES # BLD AUTO: 0.02 10*3/MM3 (ref 0–0.05)
IMM GRANULOCYTES NFR BLD AUTO: 0.3 % (ref 0–0.5)
LYMPHOCYTES # BLD AUTO: 1.24 10*3/MM3 (ref 0.7–3.1)
LYMPHOCYTES NFR BLD AUTO: 20.1 % (ref 19.6–45.3)
MCH RBC QN AUTO: 28.9 PG (ref 26.6–33)
MCHC RBC AUTO-ENTMCNC: 31.4 G/DL (ref 31.5–35.7)
MCV RBC AUTO: 91.9 FL (ref 79–97)
MONOCYTES # BLD AUTO: 0.7 10*3/MM3 (ref 0.1–0.9)
MONOCYTES NFR BLD AUTO: 11.3 % (ref 5–12)
NEUTROPHILS # BLD AUTO: 3.88 10*3/MM3 (ref 1.7–7)
NEUTROPHILS NFR BLD AUTO: 62.8 % (ref 42.7–76)
NRBC BLD AUTO-RTO: 0 /100 WBC (ref 0–0.2)
PLATELET # BLD AUTO: 170 10*3/MM3 (ref 140–450)
PMV BLD AUTO: 11.1 FL (ref 6–12)
POTASSIUM BLD-SCNC: 4.1 MMOL/L (ref 3.5–5.2)
RBC # BLD AUTO: 4.22 10*6/MM3 (ref 3.77–5.28)
SODIUM BLD-SCNC: 134 MMOL/L (ref 136–145)
WBC NRBC COR # BLD: 6.18 10*3/MM3 (ref 3.4–10.8)

## 2019-07-29 PROCEDURE — 80048 BASIC METABOLIC PNL TOTAL CA: CPT | Performed by: HOSPITALIST

## 2019-07-29 PROCEDURE — 74018 RADEX ABDOMEN 1 VIEW: CPT

## 2019-07-29 PROCEDURE — 25810000003 SODIUM CHLORIDE 0.9 % WITH KCL 20 MEQ 20-0.9 MEQ/L-% SOLUTION: Performed by: HOSPITALIST

## 2019-07-29 PROCEDURE — 97110 THERAPEUTIC EXERCISES: CPT

## 2019-07-29 PROCEDURE — 85025 COMPLETE CBC W/AUTO DIFF WBC: CPT | Performed by: HOSPITALIST

## 2019-07-29 RX ADMIN — POLYETHYLENE GLYCOL 3350 17 G: 17 POWDER, FOR SOLUTION ORAL at 20:27

## 2019-07-29 RX ADMIN — FUROSEMIDE 20 MG: 20 TABLET ORAL at 08:14

## 2019-07-29 RX ADMIN — Medication 100 MG: at 08:14

## 2019-07-29 RX ADMIN — ASPIRIN 81 MG: 81 TABLET, CHEWABLE ORAL at 08:14

## 2019-07-29 RX ADMIN — OXYBUTYNIN CHLORIDE 5 MG: 5 TABLET, EXTENDED RELEASE ORAL at 08:14

## 2019-07-29 RX ADMIN — POTASSIUM CHLORIDE AND SODIUM CHLORIDE 100 ML/HR: 900; 150 INJECTION, SOLUTION INTRAVENOUS at 06:06

## 2019-07-29 RX ADMIN — SENNOSIDES AND DOCUSATE SODIUM 2 TABLET: 8.6; 5 TABLET ORAL at 08:14

## 2019-07-29 RX ADMIN — Medication 500 MCG: at 08:14

## 2019-07-29 RX ADMIN — SODIUM CHLORIDE, PRESERVATIVE FREE 3 ML: 5 INJECTION INTRAVENOUS at 20:27

## 2019-07-30 LAB
ALBUMIN SERPL-MCNC: 3.4 G/DL (ref 3.5–5.2)
ALBUMIN/GLOB SERPL: 1.3 G/DL
ALP SERPL-CCNC: 45 U/L (ref 39–117)
ALT SERPL W P-5'-P-CCNC: 20 U/L (ref 1–33)
ANION GAP SERPL CALCULATED.3IONS-SCNC: 11.4 MMOL/L (ref 5–15)
AST SERPL-CCNC: 24 U/L (ref 1–32)
BASOPHILS # BLD AUTO: 0.07 10*3/MM3 (ref 0–0.2)
BASOPHILS NFR BLD AUTO: 1 % (ref 0–1.5)
BILIRUB SERPL-MCNC: 0.3 MG/DL (ref 0.2–1.2)
BUN BLD-MCNC: 20 MG/DL (ref 8–23)
BUN/CREAT SERPL: 23.3 (ref 7–25)
CALCIUM SPEC-SCNC: 9 MG/DL (ref 8.2–9.6)
CHLORIDE SERPL-SCNC: 106 MMOL/L (ref 98–107)
CO2 SERPL-SCNC: 22.6 MMOL/L (ref 22–29)
CREAT BLD-MCNC: 0.86 MG/DL (ref 0.57–1)
DEPRECATED RDW RBC AUTO: 47.5 FL (ref 37–54)
EOSINOPHIL # BLD AUTO: 0.26 10*3/MM3 (ref 0–0.4)
EOSINOPHIL NFR BLD AUTO: 3.6 % (ref 0.3–6.2)
ERYTHROCYTE [DISTWIDTH] IN BLOOD BY AUTOMATED COUNT: 14.6 % (ref 12.3–15.4)
GFR SERPL CREATININE-BSD FRML MDRD: 61 ML/MIN/1.73
GLOBULIN UR ELPH-MCNC: 2.6 GM/DL
GLUCOSE BLD-MCNC: 97 MG/DL (ref 65–99)
HCT VFR BLD AUTO: 37.5 % (ref 34–46.6)
HGB BLD-MCNC: 12.2 G/DL (ref 12–15.9)
IMM GRANULOCYTES # BLD AUTO: 0.02 10*3/MM3 (ref 0–0.05)
IMM GRANULOCYTES NFR BLD AUTO: 0.3 % (ref 0–0.5)
LYMPHOCYTES # BLD AUTO: 1.55 10*3/MM3 (ref 0.7–3.1)
LYMPHOCYTES NFR BLD AUTO: 21.4 % (ref 19.6–45.3)
MCH RBC QN AUTO: 28.7 PG (ref 26.6–33)
MCHC RBC AUTO-ENTMCNC: 32.5 G/DL (ref 31.5–35.7)
MCV RBC AUTO: 88.2 FL (ref 79–97)
MONOCYTES # BLD AUTO: 0.78 10*3/MM3 (ref 0.1–0.9)
MONOCYTES NFR BLD AUTO: 10.8 % (ref 5–12)
NEUTROPHILS # BLD AUTO: 4.56 10*3/MM3 (ref 1.7–7)
NEUTROPHILS NFR BLD AUTO: 62.9 % (ref 42.7–76)
NRBC BLD AUTO-RTO: 0 /100 WBC (ref 0–0.2)
PLATELET # BLD AUTO: 178 10*3/MM3 (ref 140–450)
PMV BLD AUTO: 11.4 FL (ref 6–12)
POTASSIUM BLD-SCNC: 4.4 MMOL/L (ref 3.5–5.2)
PROT SERPL-MCNC: 6 G/DL (ref 6–8.5)
RBC # BLD AUTO: 4.25 10*6/MM3 (ref 3.77–5.28)
SODIUM BLD-SCNC: 140 MMOL/L (ref 136–145)
WBC NRBC COR # BLD: 7.24 10*3/MM3 (ref 3.4–10.8)

## 2019-07-30 PROCEDURE — 85025 COMPLETE CBC W/AUTO DIFF WBC: CPT | Performed by: HOSPITALIST

## 2019-07-30 PROCEDURE — 80053 COMPREHEN METABOLIC PANEL: CPT | Performed by: HOSPITALIST

## 2019-07-30 RX ADMIN — Medication 100 MG: at 09:17

## 2019-07-30 RX ADMIN — FUROSEMIDE 20 MG: 20 TABLET ORAL at 09:17

## 2019-07-30 RX ADMIN — CLONAZEPAM 0.5 MG: 1 TABLET ORAL at 22:04

## 2019-07-30 RX ADMIN — Medication 500 MCG: at 09:17

## 2019-07-30 RX ADMIN — POLYETHYLENE GLYCOL 3350 17 G: 17 POWDER, FOR SOLUTION ORAL at 20:47

## 2019-07-30 RX ADMIN — SENNOSIDES AND DOCUSATE SODIUM 2 TABLET: 8.6; 5 TABLET ORAL at 09:17

## 2019-07-30 RX ADMIN — SODIUM CHLORIDE, PRESERVATIVE FREE 3 ML: 5 INJECTION INTRAVENOUS at 20:49

## 2019-07-30 RX ADMIN — OXYBUTYNIN CHLORIDE 5 MG: 5 TABLET, EXTENDED RELEASE ORAL at 09:17

## 2019-07-30 RX ADMIN — SOLIFENACIN SUCCINATE 5 MG: 5 TABLET, FILM COATED ORAL at 09:27

## 2019-07-30 RX ADMIN — ASPIRIN 81 MG: 81 TABLET, CHEWABLE ORAL at 09:17

## 2019-07-30 RX ADMIN — SODIUM CHLORIDE, PRESERVATIVE FREE 3 ML: 5 INJECTION INTRAVENOUS at 08:45

## 2019-07-30 RX ADMIN — POLYETHYLENE GLYCOL 3350 17 G: 17 POWDER, FOR SOLUTION ORAL at 09:18

## 2019-07-31 VITALS
RESPIRATION RATE: 16 BRPM | DIASTOLIC BLOOD PRESSURE: 68 MMHG | TEMPERATURE: 97.5 F | WEIGHT: 129.5 LBS | BODY MASS INDEX: 21.58 KG/M2 | OXYGEN SATURATION: 97 % | HEIGHT: 65 IN | SYSTOLIC BLOOD PRESSURE: 159 MMHG | HEART RATE: 70 BPM

## 2019-07-31 LAB
ALBUMIN SERPL-MCNC: 3.5 G/DL (ref 3.5–5.2)
ALBUMIN/GLOB SERPL: 1.2 G/DL
ALP SERPL-CCNC: 49 U/L (ref 39–117)
ALT SERPL W P-5'-P-CCNC: 27 U/L (ref 1–33)
ANION GAP SERPL CALCULATED.3IONS-SCNC: 10.4 MMOL/L (ref 5–15)
AST SERPL-CCNC: 35 U/L (ref 1–32)
BILIRUB SERPL-MCNC: 0.4 MG/DL (ref 0.2–1.2)
BUN BLD-MCNC: 19 MG/DL (ref 8–23)
BUN/CREAT SERPL: 19.4 (ref 7–25)
CALCIUM SPEC-SCNC: 9.2 MG/DL (ref 8.2–9.6)
CHLORIDE SERPL-SCNC: 104 MMOL/L (ref 98–107)
CO2 SERPL-SCNC: 23.6 MMOL/L (ref 22–29)
CREAT BLD-MCNC: 0.98 MG/DL (ref 0.57–1)
DEPRECATED RDW RBC AUTO: 46.7 FL (ref 37–54)
ERYTHROCYTE [DISTWIDTH] IN BLOOD BY AUTOMATED COUNT: 14.6 % (ref 12.3–15.4)
GFR SERPL CREATININE-BSD FRML MDRD: 53 ML/MIN/1.73
GLOBULIN UR ELPH-MCNC: 3 GM/DL
GLUCOSE BLD-MCNC: 96 MG/DL (ref 65–99)
HCT VFR BLD AUTO: 42.7 % (ref 34–46.6)
HGB BLD-MCNC: 13.9 G/DL (ref 12–15.9)
MCH RBC QN AUTO: 28.5 PG (ref 26.6–33)
MCHC RBC AUTO-ENTMCNC: 32.6 G/DL (ref 31.5–35.7)
MCV RBC AUTO: 87.5 FL (ref 79–97)
PLATELET # BLD AUTO: 213 10*3/MM3 (ref 140–450)
PMV BLD AUTO: 11.2 FL (ref 6–12)
POTASSIUM BLD-SCNC: 4.5 MMOL/L (ref 3.5–5.2)
PROT SERPL-MCNC: 6.5 G/DL (ref 6–8.5)
RBC # BLD AUTO: 4.88 10*6/MM3 (ref 3.77–5.28)
SODIUM BLD-SCNC: 138 MMOL/L (ref 136–145)
WBC NRBC COR # BLD: 7.26 10*3/MM3 (ref 3.4–10.8)

## 2019-07-31 PROCEDURE — 85027 COMPLETE CBC AUTOMATED: CPT | Performed by: HOSPITALIST

## 2019-07-31 PROCEDURE — 80053 COMPREHEN METABOLIC PANEL: CPT | Performed by: HOSPITALIST

## 2019-07-31 RX ORDER — BISACODYL 10 MG
10 SUPPOSITORY, RECTAL RECTAL DAILY PRN
Start: 2019-07-31 | End: 2020-01-30

## 2019-07-31 RX ORDER — CLONAZEPAM 0.5 MG/1
0.5 TABLET ORAL DAILY PRN
Qty: 3 TABLET | Refills: 0 | Status: SHIPPED | OUTPATIENT
Start: 2019-07-31 | End: 2020-10-28 | Stop reason: SDUPTHER

## 2019-07-31 RX ORDER — SENNA AND DOCUSATE SODIUM 50; 8.6 MG/1; MG/1
2 TABLET, FILM COATED ORAL NIGHTLY
Start: 2019-08-01 | End: 2020-01-30

## 2019-07-31 RX ADMIN — Medication 500 MCG: at 09:07

## 2019-07-31 RX ADMIN — POLYETHYLENE GLYCOL 3350 17 G: 17 POWDER, FOR SOLUTION ORAL at 09:08

## 2019-07-31 RX ADMIN — SODIUM CHLORIDE, PRESERVATIVE FREE 3 ML: 5 INJECTION INTRAVENOUS at 09:08

## 2019-07-31 RX ADMIN — OXYBUTYNIN CHLORIDE 5 MG: 5 TABLET, EXTENDED RELEASE ORAL at 09:07

## 2019-07-31 RX ADMIN — Medication 100 MG: at 09:07

## 2019-07-31 RX ADMIN — ASPIRIN 81 MG: 81 TABLET, CHEWABLE ORAL at 09:07

## 2019-07-31 RX ADMIN — FUROSEMIDE 20 MG: 20 TABLET ORAL at 09:07

## 2019-07-31 RX ADMIN — SENNOSIDES AND DOCUSATE SODIUM 2 TABLET: 8.6; 5 TABLET ORAL at 09:07

## 2019-08-01 ENCOUNTER — DOCUMENTATION (OUTPATIENT)
Dept: SOCIAL WORK | Facility: HOSPITAL | Age: 84
End: 2019-08-01

## 2019-08-01 NOTE — PROGRESS NOTES
Per [facility] hotline (Lra [phone]), report filed 7/29/19 (web ID [id number]) not accepted. Pt discharged. No additional needs. Landy Ramirez LCSW

## 2019-08-06 NOTE — PROGRESS NOTES
Continued Stay Note  [facility]     Patient Name: Shira Davila  MRN: [id number]  Today's Date: 8/6/2019    Admit Date: 7/27/2019    Discharge Plan     Row Name 08/06/19 1916       Plan    Plan Comments  Seema with [facility] stated that they can not follow the patient due to her being in between PCP's and her appointment with her new PCP being at the end of the month.  Seema stated that they can not follow a case that long.  LEAH Ledesma        Discharge Codes    No documentation.       Expected Discharge Date and Time     Expected Discharge Date Expected Discharge Time    Jul 31, 2019             LEAH Salazar

## 2019-09-09 ENCOUNTER — OFFICE VISIT (OUTPATIENT)
Dept: FAMILY MEDICINE CLINIC | Facility: CLINIC | Age: 84
End: 2019-09-09

## 2019-09-09 VITALS
DIASTOLIC BLOOD PRESSURE: 78 MMHG | HEIGHT: 64 IN | BODY MASS INDEX: 22.23 KG/M2 | OXYGEN SATURATION: 99 % | SYSTOLIC BLOOD PRESSURE: 140 MMHG | HEART RATE: 87 BPM

## 2019-09-09 DIAGNOSIS — I10 BENIGN ESSENTIAL HTN: ICD-10-CM

## 2019-09-09 DIAGNOSIS — N18.30 CHRONIC KIDNEY DISEASE, STAGE III (MODERATE) (HCC): ICD-10-CM

## 2019-09-09 DIAGNOSIS — K52.9 COLITIS: ICD-10-CM

## 2019-09-09 DIAGNOSIS — Z09 HOSPITAL DISCHARGE FOLLOW-UP: Primary | ICD-10-CM

## 2019-09-09 DIAGNOSIS — I25.119 CORONARY ARTERY DISEASE INVOLVING NATIVE HEART WITH ANGINA PECTORIS, UNSPECIFIED VESSEL OR LESION TYPE (HCC): ICD-10-CM

## 2019-09-09 DIAGNOSIS — F41.9 ANXIETY: ICD-10-CM

## 2019-09-09 PROCEDURE — 99214 OFFICE O/P EST MOD 30 MIN: CPT | Performed by: FAMILY MEDICINE

## 2019-09-09 NOTE — PROGRESS NOTES
Transitional Care Follow Up Visit  Subjective     Shira Davila is a 89 y.o. female who presents for a transitional care management visit.    Within 48 business hours after discharge our office contacted her via telephone to coordinate her care and needs.     Patient is a 89-year-old female brand-new patient for me today.  Has been recently discharged from the nursing rehab facility.  She has been at [facility].  Of admission 7/27/2019   date of discharge 7/31/2019  Charge diagnosis colitis next coronary artery disease next hypertension next chronic kidney disease next chronic systolic CHF.      I reviewed and discussed the details of that call along with the discharge summary, hospital problems, inpatient lab results, inpatient diagnostic studies, and consultation reports with Shira.     Current outpatient and discharge medications have been reconciled for the patient.  Reviewed by: Aletha Rincon MD      No flowsheet data found.  Risk for Readmission (LACE) No Data Recorded    History of Present Illness   Course During Hospital Stay:  See hospital records.     The following portions of the patient's history were reviewed and updated as appropriate: allergies, current medications, past medical history, past social history, past surgical history and problem list.    Review of Systems   Constitutional: Negative.    Respiratory: Negative.    Cardiovascular: Negative.    Gastrointestinal: Negative.        Objective   Physical Exam   Constitutional:   malnourished   Cardiovascular: Normal rate and regular rhythm. Exam reveals no friction rub.   No murmur heard.  Pulmonary/Chest: Effort normal and breath sounds normal. No respiratory distress.   Nursing note and vitals reviewed.      Assessment/Plan   Shira was seen today for transitional care management.    Diagnoses and all orders for this visit:    Hospital discharge follow-up    Benign essential HTN    Coronary artery disease involving native  heart with angina pectoris, unspecified vessel or lesion type (CMS/[facility])    Chronic kidney disease, stage III (moderate) (CMS/[facility])    Anxiety    Colitis      Return in about 6 months (around 3/9/2020) for Medicare Wellness.

## 2019-12-17 ENCOUNTER — APPOINTMENT (OUTPATIENT)
Dept: GENERAL RADIOLOGY | Facility: HOSPITAL | Age: 84
End: 2019-12-17

## 2019-12-17 PROCEDURE — 73630 X-RAY EXAM OF FOOT: CPT | Performed by: EMERGENCY MEDICINE

## 2019-12-20 ENCOUNTER — APPOINTMENT (OUTPATIENT)
Dept: CARDIOLOGY | Facility: HOSPITAL | Age: 84
End: 2019-12-20

## 2019-12-20 ENCOUNTER — APPOINTMENT (OUTPATIENT)
Dept: GENERAL RADIOLOGY | Facility: HOSPITAL | Age: 84
End: 2019-12-20

## 2019-12-20 ENCOUNTER — HOSPITAL ENCOUNTER (EMERGENCY)
Facility: HOSPITAL | Age: 84
Discharge: HOME | End: 2019-12-20
Attending: EMERGENCY MEDICINE | Admitting: EMERGENCY MEDICINE

## 2019-12-20 VITALS
RESPIRATION RATE: 18 BRPM | OXYGEN SATURATION: 97 % | TEMPERATURE: 98.5 F | BODY MASS INDEX: 23.22 KG/M2 | SYSTOLIC BLOOD PRESSURE: 154 MMHG | HEART RATE: 82 BPM | HEIGHT: 64 IN | DIASTOLIC BLOOD PRESSURE: 66 MMHG | WEIGHT: 136 LBS

## 2019-12-20 DIAGNOSIS — M79.89 RIGHT LEG SWELLING: ICD-10-CM

## 2019-12-20 DIAGNOSIS — M79.89 SWELLING OF RIGHT FOOT: Primary | ICD-10-CM

## 2019-12-20 LAB
ANION GAP SERPL CALCULATED.3IONS-SCNC: 9.7 MMOL/L (ref 5–15)
BASOPHILS # BLD AUTO: 0.05 10*3/MM3 (ref 0–0.2)
BASOPHILS NFR BLD AUTO: 0.7 % (ref 0–1.5)
BH CV LOWER VASCULAR LEFT COMMON FEMORAL AUGMENT: NORMAL
BH CV LOWER VASCULAR LEFT COMMON FEMORAL COMPETENT: NORMAL
BH CV LOWER VASCULAR LEFT COMMON FEMORAL COMPRESS: NORMAL
BH CV LOWER VASCULAR LEFT COMMON FEMORAL PHASIC: NORMAL
BH CV LOWER VASCULAR LEFT COMMON FEMORAL SPONT: NORMAL
BH CV LOWER VASCULAR RIGHT COMMON FEMORAL AUGMENT: NORMAL
BH CV LOWER VASCULAR RIGHT COMMON FEMORAL COMPETENT: NORMAL
BH CV LOWER VASCULAR RIGHT COMMON FEMORAL COMPRESS: NORMAL
BH CV LOWER VASCULAR RIGHT COMMON FEMORAL PHASIC: NORMAL
BH CV LOWER VASCULAR RIGHT COMMON FEMORAL SPONT: NORMAL
BH CV LOWER VASCULAR RIGHT DISTAL FEMORAL COMPRESS: NORMAL
BH CV LOWER VASCULAR RIGHT GASTRONEMIUS COMPRESS: NORMAL
BH CV LOWER VASCULAR RIGHT GREATER SAPH AK COMPRESS: NORMAL
BH CV LOWER VASCULAR RIGHT GREATER SAPH BK COMPRESS: NORMAL
BH CV LOWER VASCULAR RIGHT MID FEMORAL AUGMENT: NORMAL
BH CV LOWER VASCULAR RIGHT MID FEMORAL COMPETENT: NORMAL
BH CV LOWER VASCULAR RIGHT MID FEMORAL COMPRESS: NORMAL
BH CV LOWER VASCULAR RIGHT MID FEMORAL PHASIC: NORMAL
BH CV LOWER VASCULAR RIGHT MID FEMORAL SPONT: NORMAL
BH CV LOWER VASCULAR RIGHT PERONEAL COMPRESS: NORMAL
BH CV LOWER VASCULAR RIGHT POPLITEAL AUGMENT: NORMAL
BH CV LOWER VASCULAR RIGHT POPLITEAL COMPETENT: NORMAL
BH CV LOWER VASCULAR RIGHT POPLITEAL COMPRESS: NORMAL
BH CV LOWER VASCULAR RIGHT POPLITEAL PHASIC: NORMAL
BH CV LOWER VASCULAR RIGHT POPLITEAL SPONT: NORMAL
BH CV LOWER VASCULAR RIGHT POSTERIOR TIBIAL COMPRESS: NORMAL
BH CV LOWER VASCULAR RIGHT PROFUNDA FEMORAL COMPRESS: NORMAL
BH CV LOWER VASCULAR RIGHT PROXIMAL FEMORAL COMPRESS: NORMAL
BH CV LOWER VASCULAR RIGHT SAPHENOFEMORAL JUNCTION COMPRESS: NORMAL
BUN BLD-MCNC: 25 MG/DL (ref 8–23)
BUN/CREAT SERPL: 25.3 (ref 7–25)
CALCIUM SPEC-SCNC: 9.8 MG/DL (ref 8.2–9.6)
CHLORIDE SERPL-SCNC: 102 MMOL/L (ref 98–107)
CO2 SERPL-SCNC: 25.3 MMOL/L (ref 22–29)
CREAT BLD-MCNC: 0.99 MG/DL (ref 0.57–1)
DEPRECATED RDW RBC AUTO: 42.3 FL (ref 37–54)
EOSINOPHIL # BLD AUTO: 0.21 10*3/MM3 (ref 0–0.4)
EOSINOPHIL NFR BLD AUTO: 2.8 % (ref 0.3–6.2)
ERYTHROCYTE [DISTWIDTH] IN BLOOD BY AUTOMATED COUNT: 13.1 % (ref 12.3–15.4)
GFR SERPL CREATININE-BSD FRML MDRD: 52 ML/MIN/1.73
GLUCOSE BLD-MCNC: 96 MG/DL (ref 65–99)
HCT VFR BLD AUTO: 39.7 % (ref 34–46.6)
HGB BLD-MCNC: 13.1 G/DL (ref 12–15.9)
IMM GRANULOCYTES # BLD AUTO: 0.01 10*3/MM3 (ref 0–0.05)
IMM GRANULOCYTES NFR BLD AUTO: 0.1 % (ref 0–0.5)
LYMPHOCYTES # BLD AUTO: 1.43 10*3/MM3 (ref 0.7–3.1)
LYMPHOCYTES NFR BLD AUTO: 18.9 % (ref 19.6–45.3)
MCH RBC QN AUTO: 29.3 PG (ref 26.6–33)
MCHC RBC AUTO-ENTMCNC: 33 G/DL (ref 31.5–35.7)
MCV RBC AUTO: 88.8 FL (ref 79–97)
MONOCYTES # BLD AUTO: 0.7 10*3/MM3 (ref 0.1–0.9)
MONOCYTES NFR BLD AUTO: 9.3 % (ref 5–12)
NEUTROPHILS # BLD AUTO: 5.15 10*3/MM3 (ref 1.7–7)
NEUTROPHILS NFR BLD AUTO: 68.2 % (ref 42.7–76)
NRBC BLD AUTO-RTO: 0 /100 WBC (ref 0–0.2)
PLATELET # BLD AUTO: 211 10*3/MM3 (ref 140–450)
PMV BLD AUTO: 11 FL (ref 6–12)
POTASSIUM BLD-SCNC: 4.5 MMOL/L (ref 3.5–5.2)
RBC # BLD AUTO: 4.47 10*6/MM3 (ref 3.77–5.28)
SODIUM BLD-SCNC: 137 MMOL/L (ref 136–145)
WBC NRBC COR # BLD: 7.55 10*3/MM3 (ref 3.4–10.8)

## 2019-12-20 PROCEDURE — 36415 COLL VENOUS BLD VENIPUNCTURE: CPT

## 2019-12-20 PROCEDURE — 93971 EXTREMITY STUDY: CPT

## 2019-12-20 PROCEDURE — 73630 X-RAY EXAM OF FOOT: CPT

## 2019-12-20 PROCEDURE — 99283 EMERGENCY DEPT VISIT LOW MDM: CPT

## 2019-12-20 PROCEDURE — 80048 BASIC METABOLIC PNL TOTAL CA: CPT | Performed by: EMERGENCY MEDICINE

## 2019-12-20 PROCEDURE — 85025 COMPLETE CBC W/AUTO DIFF WBC: CPT | Performed by: EMERGENCY MEDICINE

## 2019-12-20 RX ORDER — SODIUM CHLORIDE 0.9 % (FLUSH) 0.9 %
10 SYRINGE (ML) INJECTION AS NEEDED
Status: DISCONTINUED | OUTPATIENT
Start: 2019-12-20 | End: 2019-12-20 | Stop reason: HOSPADM

## 2019-12-20 NOTE — PROGRESS NOTES
12/20/19 Right LEV duplex preliminary findings are negative for DVT. Preliminary report given to Dr. Hernandez.

## 2019-12-20 NOTE — ED NOTES
Pt from home, states she lives alone. Pt poor historian on details. Just states she woke up and right leg was swollen. Pt R lower foot w/ pitting edema. Pt unable to tell exact details of when swelling began. Pt tearful on exam and keeps referring back to her sisters death and that's why she cant take care of herself now.      Renetta Mace, RN  12/20/19 9434

## 2019-12-20 NOTE — ED NOTES
Spoke w/ Radha from home health agency about concerns of pt safety at home. Per staff, pt is only visited once per week but will contact family guanaco about additional follow up and care plans for the patient      Renetta Mace RN  12/20/19 1018

## 2019-12-20 NOTE — ED PROVIDER NOTES
" EMERGENCY DEPARTMENT ENCOUNTER    Room Number:  39/39  Date seen:  12/20/2019  Time seen: 1:03 PM  PCP: Aletha Rincon MD  Historian: patient      HPI:  Chief Complaint: RLE swelling  A complete HPI/ROS/PMH/PSH/SH/FH are unobtainable due to: none  Context: Shira Davila is a 89 y.o. female who presents to the ED from a condo via EMS c/o right lower leg swelling that she noticed this AM. Pt states that the swelling was \"as big as an orange\" but has improved through the day. Pt states that she was seen on 12/8/19 for right foot pain and diagnosed with a \"broken toe.\" Pt states that she walks with a walker. Pt has hx of DVT in LLE. Pt denies fever and chills. Pt states that she lives alone and has senior help come a couple times a week.               PAST MEDICAL HISTORY  Active Ambulatory Problems     Diagnosis Date Noted   • Abdominal pain, vomiting, and diarrhea 03/16/2016   • Chronic constipation 03/16/2016   • Hemorrhoid 03/16/2016   • BP (high blood pressure) 03/16/2016   • Arthralgia of hip 03/16/2016   • LBP (low back pain) 03/16/2016   • Disease of thyroid gland 03/16/2016   • Benign essential HTN 05/28/2017   • Chronic kidney disease, stage III (moderate) (CMS/[facility]) 05/28/2017   • Debility 05/28/2017   • Chronic systolic CHF (congestive heart failure) (CMS/[facility]) 05/28/2017   • Coronary artery disease 05/29/2017   • Colitis 07/27/2019   • Bilateral impacted cerumen 07/29/2019   • Anxiety 09/09/2019     Resolved Ambulatory Problems     Diagnosis Date Noted   • Colitis presumed infectious (bacterial) 05/28/2017   • Rectal bleeding 05/29/2017   • Abnormal finding in urine 05/29/2017     Past Medical History:   Diagnosis Date   • Arthritis    • CHF (congestive heart failure) (CMS/[facility])    • Diverticulitis    • Hypertension          PAST SURGICAL HISTORY  Past Surgical History:   Procedure Laterality Date   • ABDOMINAL SURGERY      liban   • APPENDECTOMY     • COLON SURGERY      fistulectomy   • EYE SURGERY      " cataract   • HYSTERECTOMY     • TONSILLECTOMY           FAMILY HISTORY  Family History   Problem Relation Age of Onset   • Hypertension Other          SOCIAL HISTORY  Social History     Socioeconomic History   • Marital status: Single     Spouse name: Not on file   • Number of children: Not on file   • Years of education: Not on file   • Highest education level: Not on file   Tobacco Use   • Smoking status: Never Smoker   • Smokeless tobacco: Never Used   Substance and Sexual Activity   • Alcohol use: No   • Drug use: No   • Sexual activity: Defer         ALLERGIES  Amoxicillin; Cortisone; Diazepam; Erythromycin; Horse-derived products; Levoxyl  [levothyroxine sodium]; Lexapro  [escitalopram oxalate]; Lipitor [atorvastatin]; Lopressor  [metoprolol tartrate]; Metaxalone; Omeprazole; Penicillins; Sesame seed (diagnostic); Solarcaine [benzocaine]; and Sulfa antibiotics        REVIEW OF SYSTEMS  Review of Systems   Constitutional: Negative for chills and fever.   HENT: Negative for sore throat.    Respiratory: Negative for cough.    Gastrointestinal: Negative for nausea and vomiting.   Genitourinary: Negative for dysuria.   Musculoskeletal: Positive for arthralgias (known fracture to right toe). Negative for back pain.        + right lower leg swelling   Psychiatric/Behavioral: The patient is not nervous/anxious.             PHYSICAL EXAM  ED Triage Vitals [12/20/19 1142]   Temp Heart Rate Resp BP SpO2   98.5 °F (36.9 °C) 82 18 130/65 97 %      Temp src Heart Rate Source Patient Position BP Location FiO2 (%)   Tympanic Monitor -- -- --         GENERAL: no acute distress  HENT: nares patent  EYES: no scleral icterus  CV: regular rhythm, normal rate, no murmur  RESPIRATORY: normal effort, clear breath sounds bilateally  MUSCULOSKELETAL: no deformity, moderate swelling of RLE with mild ecchymosis on the medial aspect of the right calf, moderate swelling of right foot with minimal erythema to the dorsum of the right foot,  intact right DP and PT pulses, mild tenderness of right foot and right calf  NEURO: alert, moves all extremities, follows commands  SKIN: warm, dry    Vital signs and nursing notes reviewed.          LAB RESULTS  Recent Results (from the past 24 hour(s))   Basic Metabolic Panel    Collection Time: 12/20/19  1:41 PM   Result Value Ref Range    Glucose 96 65 - 99 mg/dL    BUN 25 (H) 8 - 23 mg/dL    Creatinine 0.99 0.57 - 1.00 mg/dL    Sodium 137 136 - 145 mmol/L    Potassium 4.5 3.5 - 5.2 mmol/L    Chloride 102 98 - 107 mmol/L    CO2 25.3 22.0 - 29.0 mmol/L    Calcium 9.8 (H) 8.2 - 9.6 mg/dL    eGFR Non African Amer 52 (L) >60 mL/min/1.73    BUN/Creatinine Ratio 25.3 (H) 7.0 - 25.0    Anion Gap 9.7 5.0 - 15.0 mmol/L   CBC Auto Differential    Collection Time: 12/20/19  1:41 PM   Result Value Ref Range    WBC 7.55 3.40 - 10.80 10*3/mm3    RBC 4.47 3.77 - 5.28 10*6/mm3    Hemoglobin 13.1 12.0 - 15.9 g/dL    Hematocrit 39.7 34.0 - 46.6 %    MCV 88.8 79.0 - 97.0 fL    MCH 29.3 26.6 - 33.0 pg    MCHC 33.0 31.5 - 35.7 g/dL    RDW 13.1 12.3 - 15.4 %    RDW-SD 42.3 37.0 - 54.0 fl    MPV 11.0 6.0 - 12.0 fL    Platelets 211 140 - 450 10*3/mm3    Neutrophil % 68.2 42.7 - 76.0 %    Lymphocyte % 18.9 (L) 19.6 - 45.3 %    Monocyte % 9.3 5.0 - 12.0 %    Eosinophil % 2.8 0.3 - 6.2 %    Basophil % 0.7 0.0 - 1.5 %    Immature Grans % 0.1 0.0 - 0.5 %    Neutrophils, Absolute 5.15 1.70 - 7.00 10*3/mm3    Lymphocytes, Absolute 1.43 0.70 - 3.10 10*3/mm3    Monocytes, Absolute 0.70 0.10 - 0.90 10*3/mm3    Eosinophils, Absolute 0.21 0.00 - 0.40 10*3/mm3    Basophils, Absolute 0.05 0.00 - 0.20 10*3/mm3    Immature Grans, Absolute 0.01 0.00 - 0.05 10*3/mm3    nRBC 0.0 0.0 - 0.2 /100 WBC   Duplex Venous Lower Extremity - Right CAR    Collection Time: 12/20/19  2:51 PM   Result Value Ref Range    Right Common Femoral Spont Y     Right Common Femoral Phasic Y     Right Common Femoral Augment Y     Right Common Femoral Competent Y     Right Common  Femoral Compress C     Right Saphenofemoral Junction Compress C     Right Profunda Femoral Compress C     Right Proximal Femoral Compress C     Right Mid Femoral Spont Y     Right Mid Femoral Phasic Y     Right Mid Femoral Augment Y     Right Mid Femoral Competent Y     Right Mid Femoral Compress C     Right Distal Femoral Compress C     Right Popliteal Spont Y     Right Popliteal Phasic Y     Right Popliteal Augment Y     Right Popliteal Competent Y     Right Popliteal Compress C     Right Posterior Tibial Compress C     Right Peroneal Compress C     Right GastronemiusSoleal Compress C     Right Greater Saph AK Compress C     Right Greater Saph BK Compress C     Left Common Femoral Spont Y     Left Common Femoral Phasic Y     Left Common Femoral Augment Y     Left Common Femoral Competent Y     Left Common Femoral Compress C        Ordered the above labs and reviewed the results.        RADIOLOGY  Xr Foot 3+ View Right    Result Date: 12/20/2019  XR FOOT 3+ VW RIGHT-  INDICATIONS: Trauma  TECHNIQUE: 3 views of the right foot  COMPARISON: None available  FINDINGS:  Soft tissue swelling of the forefoot could be evidence of injury, inflammation, infection, correlate clinically. Hammertoe configuration of the second through fifth digits. No acute fracture or erosion is identified. Moderate calcaneal spurring.       Soft tissue swelling. No acute fracture is identified. Follow-up/further evaluation can be obtained as indicated.  This report was finalized on 12/20/2019 4:14 PM by Dr. Gurvinder Carter M.D.        Ordered the above noted radiological studies. Reviewed by me in PACS.           PROCEDURES  Procedures                MEDICATIONS GIVEN IN ER  Medications   sodium chloride 0.9 % flush 10 mL (has no administration in time range)                     MEDICAL DECISION MAKING and ED Course    ED Course as of Dec 20 1657   Fri Dec 20, 2019   1502 Vascular  reports no DVT RLE.     [JR]      ED Course User  Index  [JR] DavidEleazar MD     1306-Discussed with pt the plan to review doppler RLE to evaluate for DVT and repeat XR right foot for evaluation of fracture. Basic labs ordered. Discussed plan to order tylenol for pain. The patient indicates understanding of these issues and agrees with the plan.    1652-Rechecked pt. Pt is resting comfortably. Notified pt of XR right foot-negative acute and Doppler RLE-negative for PE. Informed pt that in general I do not see a fracture her right foot but she should continue to f/u with the pediatrist. Discussed the plan to discharge the pt home with instructions to take OTC tylenol/advil for pain. I instructed the pt to f/u with her PCP. Pt understands and agrees with the plan, all questions answered.    1709-Pt is requesting prescription for wheelchair. Informed pt that I will write for one but that she will have to contact [employer] supply. The patient indicates understanding of these issues and agrees with the plan.      MDM  Number of Diagnoses or Management Options     Amount and/or Complexity of Data Reviewed  Clinical lab tests: reviewed and ordered (WBC-WNL  creatinine-0.99  hemoglobin-13.1  potassium-4.5)  Tests in the radiology section of CPT®: reviewed and ordered (Doppler RLE and XR right foot-negative acute)  Decide to obtain previous medical records or to obtain history from someone other than the patient: yes  Independent visualization of images, tracings, or specimens: yes    89-year-old female presents with complaint of right leg swelling after recent injury of her left foot.  She also reports previous history of DVT in the left lower extremity.  Duplex ultrasound right lower extremity is negative for DVT today.  Plain films of the right foot are negative for acute fracture.  She has been able to ambulate with her walker at her home but has requested a wheelchair to assist her in getting around her home.  She feels safe going home at this time.   She has minimal erythema over the dorsum of the right foot but with no fever, normal white count, I do not suspect cellulitis at this time.  Patient advised to elevate the right lower extremity whenever possible, follow-up with podiatry as she had previously been instructed.  Return precautions were discussed.           DIAGNOSIS  Final diagnoses:   Swelling of right foot   Right leg swelling         DISPOSITION  DISCHARGE    Patient discharged in stable condition.    Reviewed implications of results, diagnosis, meds, responsibility to follow up, warning signs and symptoms of possible worsening, potential complications and reasons to return to ER.    Patient/Family voiced understanding of above instructions.    Discussed plan for discharge, as there is no emergent indication for admission. Patient referred to primary care provider for BP management due to today's BP. Pt/family is agreeable and understands need for follow up and repeat testing.  Pt is aware that discharge does not mean that nothing is wrong but it indicates no emergency is present that requires admission and they must continue care with follow-up as given below or physician of their choice.     FOLLOW-UP  Aletha Rincon MD  [id number] [id number]  [phone]    Schedule an appointment as soon as possible for a visit            Medication List      No changes were made to your prescriptions during this visit.                   Latest Documented Vital Signs:  As of 4:57 PM  BP- 154/66 HR- 82 Temp- 98.5 °F (36.9 °C) (Tympanic) O2 sat- 97%        --  Documentation assistance provided by henrique Andino for Dr. DAVINA Hernandez MD.  Information recorded by the scribe was done at my direction and has been verified and validated by me.      Please note that portions of this were completed with a voice recognition program.                         Jody Andino  12/20/19 [id number]       Eleazar Hernandez,  MD  12/20/19 [id number]

## 2019-12-20 NOTE — ED TRIAGE NOTES
Pt woke up to hematoma to rt shin area this morning. Pt was seen at [facility] 12/17/19 for fx of toe on rt foot

## 2020-01-18 ENCOUNTER — DOCUMENTATION (OUTPATIENT)
Dept: FAMILY MEDICINE CLINIC | Facility: CLINIC | Age: 85
End: 2020-01-18

## 2020-01-20 ENCOUNTER — TELEPHONE (OUTPATIENT)
Dept: FAMILY MEDICINE CLINIC | Facility: CLINIC | Age: 85
End: 2020-01-20

## 2020-01-20 NOTE — TELEPHONE ENCOUNTER
Pt called on call doctor this weekend and wanted to be seen before March appt.  Stated not feeling well.  Pt's provider is out of the office but was going to try to scheduled on Dr. Jordan Tuesday.  Called number on file, no answer, no machine to leave message.  Will try patient later.

## 2020-01-30 ENCOUNTER — OFFICE VISIT (OUTPATIENT)
Dept: FAMILY MEDICINE CLINIC | Facility: CLINIC | Age: 85
End: 2020-01-30

## 2020-01-30 VITALS
BODY MASS INDEX: 21.21 KG/M2 | DIASTOLIC BLOOD PRESSURE: 80 MMHG | TEMPERATURE: 97.4 F | HEART RATE: 80 BPM | OXYGEN SATURATION: 97 % | SYSTOLIC BLOOD PRESSURE: 124 MMHG | HEIGHT: 64 IN | WEIGHT: 124.25 LBS

## 2020-01-30 DIAGNOSIS — B49 FUNGAL INFECTION: Primary | ICD-10-CM

## 2020-01-30 DIAGNOSIS — Z65.9 POOR SOCIAL SITUATION: ICD-10-CM

## 2020-01-30 DIAGNOSIS — M79.604 LEG PAIN, ANTERIOR, RIGHT: ICD-10-CM

## 2020-01-30 DIAGNOSIS — B49 FUNGAL INFECTION: ICD-10-CM

## 2020-01-30 PROCEDURE — 99214 OFFICE O/P EST MOD 30 MIN: CPT | Performed by: FAMILY MEDICINE

## 2020-01-30 RX ORDER — SOLIFENACIN SUCCINATE 5 MG/1
TABLET, FILM COATED ORAL
COMMUNITY
Start: 2020-01-11 | End: 2020-11-12

## 2020-01-30 NOTE — PROGRESS NOTES
Subjective   Shira Davila is a 89 y.o. female.     Chief Complaint   Patient presents with   • Transitional Care Management   • Leg Pain     spot on right leg   • rash and warts   • Back Pain       Leg Pain    Incident onset: follow up from er for cellulitis from 4 weeks ago, much improved. The pain is present in the right leg. The pain is at a severity of 5/10. The pain is mild. The symptoms are aggravated by movement. She has tried elevation for the symptoms.   Rash   This is a new problem. The current episode started more than 1 month ago. The problem has been gradually worsening since onset. The affected locations include the abdomen, groin and back. The rash is characterized by pain, redness, swelling, burning and itchiness. Associated with: urine incontinence. Past treatments include nothing.      Patient lives alone at her age.  She has no relatives.  There is a lady coming to her house helping her with some fast food bringing her food to eat.  Patient says that everything else at the house she does by herself as well as taking care of herself.  I believe we need to contact medical  through home health to evaluate home situation.    The following portions of the patient's history were reviewed and updated as appropriate: allergies, current medications, past family history, past medical history, past social history, past surgical history and problem list.    Past Medical History:   Diagnosis Date   • Anxiety    • Arthritis    • CHF (congestive heart failure) (CMS/HCC)    • Coronary artery disease    • Disease of thyroid gland 3/16/2016   • Diverticulitis    • Hypertension        Past Surgical History:   Procedure Laterality Date   • ABDOMINAL SURGERY      liban   • APPENDECTOMY     • COLON SURGERY      fistulectomy   • EYE SURGERY      cataract   • HYSTERECTOMY     • TONSILLECTOMY         Family History   Problem Relation Age of Onset   • Hypertension Other        Social History      Socioeconomic History   • Marital status: Single     Spouse name: Not on file   • Number of children: Not on file   • Years of education: Not on file   • Highest education level: Not on file   Tobacco Use   • Smoking status: Never Smoker   • Smokeless tobacco: Never Used   Substance and Sexual Activity   • Alcohol use: No   • Drug use: No   • Sexual activity: Defer       Current Outpatient Medications on File Prior to Visit   Medication Sig Dispense Refill   • aspirin 81 MG tablet Take 81 mg by mouth Daily.     • clonazePAM (KlonoPIN) 0.5 MG tablet Take 1 tablet by mouth Daily As Needed for Anxiety or Seizures. 3 tablet 0   • furosemide (LASIX) 20 MG tablet Take 20 mg by mouth daily.     • pyridoxine (B6 NATURAL) 100 MG tablet Take 100 mg by mouth Daily.     • vitamin B-12 (CYANOCOBALAMIN) 500 MCG tablet Take 500 mcg by mouth Daily.     • bisacodyl (DULCOLAX) 10 MG suppository Insert 1 suppository into the rectum Daily As Needed for Constipation.     • polyethylene glycol (MIRALAX) pack packet Take 17 g by mouth 3 (Three) Times a Day.     • sennosides-docusate sodium (SENOKOT-S) 8.6-50 MG tablet Take 2 tablets by mouth Every Night.     • VESICARE 5 MG tablet        No current facility-administered medications on file prior to visit.        Review of Systems   Respiratory: Negative.    Cardiovascular: Negative.    Skin: Positive for rash and skin lesions.       Recent Results (from the past 4704 hour(s))   Comprehensive Metabolic Panel    Collection Time: 07/27/19  4:31 PM   Result Value Ref Range    Glucose 134 (H) 65 - 99 mg/dL    BUN 28 (H) 8 - 23 mg/dL    Creatinine 1.18 (H) 0.57 - 1.00 mg/dL    Sodium 139 136 - 145 mmol/L    Potassium 4.8 3.5 - 5.2 mmol/L    Chloride 99 98 - 107 mmol/L    CO2 23.4 22.0 - 29.0 mmol/L    Calcium 9.8 (H) 8.2 - 9.6 mg/dL    Total Protein 6.9 6.0 - 8.5 g/dL    Albumin 4.00 3.50 - 5.20 g/dL    ALT (SGPT) 29 1 - 33 U/L    AST (SGOT) 37 (H) 1 - 32 U/L    Alkaline Phosphatase 65 39 -  117 U/L    Total Bilirubin 0.7 0.2 - 1.2 mg/dL    eGFR Non African Amer 42 (L) >60 mL/min/1.73    Globulin 2.9 gm/dL    A/G Ratio 1.4 g/dL    BUN/Creatinine Ratio 23.7 7.0 - 25.0    Anion Gap 16.6 (H) 5.0 - 15.0 mmol/L   Lipase    Collection Time: 07/27/19  4:31 PM   Result Value Ref Range    Lipase 43 13 - 60 U/L   Amylase    Collection Time: 07/27/19  4:31 PM   Result Value Ref Range    Amylase 122 (H) 28 - 100 U/L   CBC Auto Differential    Collection Time: 07/27/19  4:31 PM   Result Value Ref Range    WBC 15.01 (H) 3.40 - 10.80 10*3/mm3    RBC 5.11 3.77 - 5.28 10*6/mm3    Hemoglobin 14.8 12.0 - 15.9 g/dL    Hematocrit 45.1 34.0 - 46.6 %    MCV 88.3 79.0 - 97.0 fL    MCH 29.0 26.6 - 33.0 pg    MCHC 32.8 31.5 - 35.7 g/dL    RDW 14.3 12.3 - 15.4 %    RDW-SD 46.3 37.0 - 54.0 fl    MPV 11.3 6.0 - 12.0 fL    Platelets 238 140 - 450 10*3/mm3    Neutrophil % 84.5 (H) 42.7 - 76.0 %    Lymphocyte % 7.3 (L) 19.6 - 45.3 %    Monocyte % 7.0 5.0 - 12.0 %    Eosinophil % 0.2 (L) 0.3 - 6.2 %    Basophil % 0.4 0.0 - 1.5 %    Immature Grans % 0.6 (H) 0.0 - 0.5 %    Neutrophils, Absolute 12.68 (H) 1.70 - 7.00 10*3/mm3    Lymphocytes, Absolute 1.10 0.70 - 3.10 10*3/mm3    Monocytes, Absolute 1.05 (H) 0.10 - 0.90 10*3/mm3    Eosinophils, Absolute 0.03 0.00 - 0.40 10*3/mm3    Basophils, Absolute 0.06 0.00 - 0.20 10*3/mm3    Immature Grans, Absolute 0.09 (H) 0.00 - 0.05 10*3/mm3    nRBC 0.0 0.0 - 0.2 /100 WBC   Light Blue Top    Collection Time: 07/27/19  4:31 PM   Result Value Ref Range    Extra Tube hold for add-on    Gold Top - SST    Collection Time: 07/27/19  4:31 PM   Result Value Ref Range    Extra Tube Hold for add-ons.    Basic Metabolic Panel    Collection Time: 07/28/19  7:13 AM   Result Value Ref Range    Glucose 96 65 - 99 mg/dL    BUN 27 (H) 8 - 23 mg/dL    Creatinine 1.25 (H) 0.57 - 1.00 mg/dL    Sodium 136 136 - 145 mmol/L    Potassium 4.7 3.5 - 5.2 mmol/L    Chloride 102 98 - 107 mmol/L    CO2 24.6 22.0 - 29.0 mmol/L     Calcium 9.0 8.2 - 9.6 mg/dL    eGFR Non African Amer 40 (L) >60 mL/min/1.73    BUN/Creatinine Ratio 21.6 7.0 - 25.0    Anion Gap 9.4 5.0 - 15.0 mmol/L   CBC (No Diff)    Collection Time: 07/28/19  7:13 AM   Result Value Ref Range    WBC 9.10 3.40 - 10.80 10*3/mm3    RBC 4.26 3.77 - 5.28 10*6/mm3    Hemoglobin 12.0 12.0 - 15.9 g/dL    Hematocrit 37.9 34.0 - 46.6 %    MCV 89.0 79.0 - 97.0 fL    MCH 28.2 26.6 - 33.0 pg    MCHC 31.7 31.5 - 35.7 g/dL    RDW 14.7 12.3 - 15.4 %    RDW-SD 47.7 37.0 - 54.0 fl    MPV 11.1 6.0 - 12.0 fL    Platelets 188 140 - 450 10*3/mm3   Magnesium    Collection Time: 07/28/19  7:13 AM   Result Value Ref Range    Magnesium 2.7 (H) 1.7 - 2.3 mg/dL   Phosphorus    Collection Time: 07/28/19  7:13 AM   Result Value Ref Range    Phosphorus 4.0 2.5 - 4.5 mg/dL   Basic Metabolic Panel    Collection Time: 07/29/19  6:06 AM   Result Value Ref Range    Glucose 95 65 - 99 mg/dL    BUN 23 8 - 23 mg/dL    Creatinine 1.08 (H) 0.57 - 1.00 mg/dL    Sodium 134 (L) 136 - 145 mmol/L    Potassium 4.1 3.5 - 5.2 mmol/L    Chloride 103 98 - 107 mmol/L    CO2 21.3 (L) 22.0 - 29.0 mmol/L    Calcium 8.5 8.2 - 9.6 mg/dL    eGFR Non African Amer 47 (L) >60 mL/min/1.73    BUN/Creatinine Ratio 21.3 7.0 - 25.0    Anion Gap 9.7 5.0 - 15.0 mmol/L   CBC Auto Differential    Collection Time: 07/29/19  6:06 AM   Result Value Ref Range    WBC 6.18 3.40 - 10.80 10*3/mm3    RBC 4.22 3.77 - 5.28 10*6/mm3    Hemoglobin 12.2 12.0 - 15.9 g/dL    Hematocrit 38.8 34.0 - 46.6 %    MCV 91.9 79.0 - 97.0 fL    MCH 28.9 26.6 - 33.0 pg    MCHC 31.4 (L) 31.5 - 35.7 g/dL    RDW 14.7 12.3 - 15.4 %    RDW-SD 49.9 37.0 - 54.0 fl    MPV 11.1 6.0 - 12.0 fL    Platelets 170 140 - 450 10*3/mm3    Neutrophil % 62.8 42.7 - 76.0 %    Lymphocyte % 20.1 19.6 - 45.3 %    Monocyte % 11.3 5.0 - 12.0 %    Eosinophil % 4.9 0.3 - 6.2 %    Basophil % 0.6 0.0 - 1.5 %    Immature Grans % 0.3 0.0 - 0.5 %    Neutrophils, Absolute 3.88 1.70 - 7.00 10*3/mm3     Lymphocytes, Absolute 1.24 0.70 - 3.10 10*3/mm3    Monocytes, Absolute 0.70 0.10 - 0.90 10*3/mm3    Eosinophils, Absolute 0.30 0.00 - 0.40 10*3/mm3    Basophils, Absolute 0.04 0.00 - 0.20 10*3/mm3    Immature Grans, Absolute 0.02 0.00 - 0.05 10*3/mm3    nRBC 0.0 0.0 - 0.2 /100 WBC   Comprehensive Metabolic Panel    Collection Time: 07/30/19  5:36 AM   Result Value Ref Range    Glucose 97 65 - 99 mg/dL    BUN 20 8 - 23 mg/dL    Creatinine 0.86 0.57 - 1.00 mg/dL    Sodium 140 136 - 145 mmol/L    Potassium 4.4 3.5 - 5.2 mmol/L    Chloride 106 98 - 107 mmol/L    CO2 22.6 22.0 - 29.0 mmol/L    Calcium 9.0 8.2 - 9.6 mg/dL    Total Protein 6.0 6.0 - 8.5 g/dL    Albumin 3.40 (L) 3.50 - 5.20 g/dL    ALT (SGPT) 20 1 - 33 U/L    AST (SGOT) 24 1 - 32 U/L    Alkaline Phosphatase 45 39 - 117 U/L    Total Bilirubin 0.3 0.2 - 1.2 mg/dL    eGFR Non African Amer 61 >60 mL/min/1.73    Globulin 2.6 gm/dL    A/G Ratio 1.3 g/dL    BUN/Creatinine Ratio 23.3 7.0 - 25.0    Anion Gap 11.4 5.0 - 15.0 mmol/L   CBC Auto Differential    Collection Time: 07/30/19  5:36 AM   Result Value Ref Range    WBC 7.24 3.40 - 10.80 10*3/mm3    RBC 4.25 3.77 - 5.28 10*6/mm3    Hemoglobin 12.2 12.0 - 15.9 g/dL    Hematocrit 37.5 34.0 - 46.6 %    MCV 88.2 79.0 - 97.0 fL    MCH 28.7 26.6 - 33.0 pg    MCHC 32.5 31.5 - 35.7 g/dL    RDW 14.6 12.3 - 15.4 %    RDW-SD 47.5 37.0 - 54.0 fl    MPV 11.4 6.0 - 12.0 fL    Platelets 178 140 - 450 10*3/mm3    Neutrophil % 62.9 42.7 - 76.0 %    Lymphocyte % 21.4 19.6 - 45.3 %    Monocyte % 10.8 5.0 - 12.0 %    Eosinophil % 3.6 0.3 - 6.2 %    Basophil % 1.0 0.0 - 1.5 %    Immature Grans % 0.3 0.0 - 0.5 %    Neutrophils, Absolute 4.56 1.70 - 7.00 10*3/mm3    Lymphocytes, Absolute 1.55 0.70 - 3.10 10*3/mm3    Monocytes, Absolute 0.78 0.10 - 0.90 10*3/mm3    Eosinophils, Absolute 0.26 0.00 - 0.40 10*3/mm3    Basophils, Absolute 0.07 0.00 - 0.20 10*3/mm3    Immature Grans, Absolute 0.02 0.00 - 0.05 10*3/mm3    nRBC 0.0 0.0 - 0.2  /100 WBC   Comprehensive Metabolic Panel    Collection Time: 07/31/19  4:54 AM   Result Value Ref Range    Glucose 96 65 - 99 mg/dL    BUN 19 8 - 23 mg/dL    Creatinine 0.98 0.57 - 1.00 mg/dL    Sodium 138 136 - 145 mmol/L    Potassium 4.5 3.5 - 5.2 mmol/L    Chloride 104 98 - 107 mmol/L    CO2 23.6 22.0 - 29.0 mmol/L    Calcium 9.2 8.2 - 9.6 mg/dL    Total Protein 6.5 6.0 - 8.5 g/dL    Albumin 3.50 3.50 - 5.20 g/dL    ALT (SGPT) 27 1 - 33 U/L    AST (SGOT) 35 (H) 1 - 32 U/L    Alkaline Phosphatase 49 39 - 117 U/L    Total Bilirubin 0.4 0.2 - 1.2 mg/dL    eGFR Non African Amer 53 (L) >60 mL/min/1.73    Globulin 3.0 gm/dL    A/G Ratio 1.2 g/dL    BUN/Creatinine Ratio 19.4 7.0 - 25.0    Anion Gap 10.4 5.0 - 15.0 mmol/L   CBC (No Diff)    Collection Time: 07/31/19  4:54 AM   Result Value Ref Range    WBC 7.26 3.40 - 10.80 10*3/mm3    RBC 4.88 3.77 - 5.28 10*6/mm3    Hemoglobin 13.9 12.0 - 15.9 g/dL    Hematocrit 42.7 34.0 - 46.6 %    MCV 87.5 79.0 - 97.0 fL    MCH 28.5 26.6 - 33.0 pg    MCHC 32.6 31.5 - 35.7 g/dL    RDW 14.6 12.3 - 15.4 %    RDW-SD 46.7 37.0 - 54.0 fl    MPV 11.2 6.0 - 12.0 fL    Platelets 213 140 - 450 10*3/mm3   Basic Metabolic Panel    Collection Time: 12/20/19  1:41 PM   Result Value Ref Range    Glucose 96 65 - 99 mg/dL    BUN 25 (H) 8 - 23 mg/dL    Creatinine 0.99 0.57 - 1.00 mg/dL    Sodium 137 136 - 145 mmol/L    Potassium 4.5 3.5 - 5.2 mmol/L    Chloride 102 98 - 107 mmol/L    CO2 25.3 22.0 - 29.0 mmol/L    Calcium 9.8 (H) 8.2 - 9.6 mg/dL    eGFR Non African Amer 52 (L) >60 mL/min/1.73    BUN/Creatinine Ratio 25.3 (H) 7.0 - 25.0    Anion Gap 9.7 5.0 - 15.0 mmol/L   CBC Auto Differential    Collection Time: 12/20/19  1:41 PM   Result Value Ref Range    WBC 7.55 3.40 - 10.80 10*3/mm3    RBC 4.47 3.77 - 5.28 10*6/mm3    Hemoglobin 13.1 12.0 - 15.9 g/dL    Hematocrit 39.7 34.0 - 46.6 %    MCV 88.8 79.0 - 97.0 fL    MCH 29.3 26.6 - 33.0 pg    MCHC 33.0 31.5 - 35.7 g/dL    RDW 13.1 12.3 - 15.4 %     "RDW-SD 42.3 37.0 - 54.0 fl    MPV 11.0 6.0 - 12.0 fL    Platelets 211 140 - 450 10*3/mm3    Neutrophil % 68.2 42.7 - 76.0 %    Lymphocyte % 18.9 (L) 19.6 - 45.3 %    Monocyte % 9.3 5.0 - 12.0 %    Eosinophil % 2.8 0.3 - 6.2 %    Basophil % 0.7 0.0 - 1.5 %    Immature Grans % 0.1 0.0 - 0.5 %    Neutrophils, Absolute 5.15 1.70 - 7.00 10*3/mm3    Lymphocytes, Absolute 1.43 0.70 - 3.10 10*3/mm3    Monocytes, Absolute 0.70 0.10 - 0.90 10*3/mm3    Eosinophils, Absolute 0.21 0.00 - 0.40 10*3/mm3    Basophils, Absolute 0.05 0.00 - 0.20 10*3/mm3    Immature Grans, Absolute 0.01 0.00 - 0.05 10*3/mm3    nRBC 0.0 0.0 - 0.2 /100 WBC   Duplex Venous Lower Extremity - Right CAR    Collection Time: 12/20/19  2:51 PM   Result Value Ref Range    Right Common Femoral Spont Y     Right Common Femoral Phasic Y     Right Common Femoral Augment Y     Right Common Femoral Competent Y     Right Common Femoral Compress C     Right Saphenofemoral Junction Compress C     Right Profunda Femoral Compress C     Right Proximal Femoral Compress C     Right Mid Femoral Spont Y     Right Mid Femoral Phasic Y     Right Mid Femoral Augment Y     Right Mid Femoral Competent Y     Right Mid Femoral Compress C     Right Distal Femoral Compress C     Right Popliteal Spont Y     Right Popliteal Phasic Y     Right Popliteal Augment Y     Right Popliteal Competent Y     Right Popliteal Compress C     Right Posterior Tibial Compress C     Right Peroneal Compress C     Right GastronemiusSoleal Compress C     Right Greater Saph AK Compress C     Right Greater Saph BK Compress C     Left Common Femoral Spont Y     Left Common Femoral Phasic Y     Left Common Femoral Augment Y     Left Common Femoral Competent Y     Left Common Femoral Compress C      Objective   Vitals:    01/30/20 1552   BP: 124/80   Pulse: 80   Temp: 97.4 °F (36.3 °C)   SpO2: 97%   Weight: 56.4 kg (124 lb 4 oz)   Height: 162.6 cm (64.02\")     Body mass index is 21.32 kg/m².  Physical Exam "   Constitutional: She appears well-developed and well-nourished. No distress.   Cardiovascular: Normal rate and regular rhythm.   Pulmonary/Chest: Effort normal and breath sounds normal. No respiratory distress. She has no wheezes.   Skin: She is not diaphoretic.   Severe fungal skin infection ob lower abdomen, buttocks, groin, perineum   Nursing note and vitals reviewed.        Assessment/Plan   Shira was seen today for transitional care management, leg pain, rash and warts and back pain.    Diagnoses and all orders for this visit:    Fungal infection  -     nystatin-zinc oxide; Apply to bid to skin    Poor social situation  -     Ambulatory Referral to Home Health    Return if symptoms worsen or fail to improve.

## 2020-02-03 ENCOUNTER — TELEPHONE (OUTPATIENT)
Dept: FAMILY MEDICINE CLINIC | Facility: CLINIC | Age: 85
End: 2020-02-03

## 2020-02-03 DIAGNOSIS — N18.30 CHRONIC KIDNEY DISEASE, STAGE III (MODERATE) (HCC): Primary | ICD-10-CM

## 2020-02-03 DIAGNOSIS — R53.81 DEBILITY: ICD-10-CM

## 2020-02-03 NOTE — TELEPHONE ENCOUNTER
Pam marin/ [facility] called regarding the order for . They are not able to only send  out the order must have some thing besides .   Pam can be reached at the following number [phone].

## 2020-02-27 RX ORDER — FUROSEMIDE 20 MG/1
TABLET ORAL
Qty: 90 TABLET | Refills: 2 | Status: SHIPPED | OUTPATIENT
Start: 2020-02-27 | End: 2021-09-30 | Stop reason: SDUPTHER

## 2020-09-03 RX ORDER — SOLIFENACIN SUCCINATE 5 MG/1
TABLET, FILM COATED ORAL
Qty: 90 TABLET | Refills: 1 | Status: SHIPPED | OUTPATIENT
Start: 2020-09-03 | End: 2021-05-03

## 2020-10-19 ENCOUNTER — HOSPITAL ENCOUNTER (EMERGENCY)
Facility: HOSPITAL | Age: 85
Discharge: LEFT BEFORE DISCHARGE | End: 2020-10-19

## 2020-10-19 VITALS — OXYGEN SATURATION: 91 % | RESPIRATION RATE: 18 BRPM | HEART RATE: 49 BPM | TEMPERATURE: 96.7 F

## 2020-10-26 ENCOUNTER — TELEPHONE (OUTPATIENT)
Dept: FAMILY MEDICINE CLINIC | Facility: CLINIC | Age: 85
End: 2020-10-26

## 2020-10-26 NOTE — TELEPHONE ENCOUNTER
Patient is requesting a refill on her clonazepam .5 mg tablets.  She says she had a bottle with 14 pills left, but some how has lost it.  She said her nerves are really bad and she really needs to take one (she was crying as she was talking to me).  She said she only takes a half a pill when she does use them.  Her pharmacy is [facility] on file.  Her phone number is [phone]. If someone could call her and let her know when it's called in, she would appreciate.

## 2020-10-27 NOTE — TELEPHONE ENCOUNTER
Please call patient and let patient know that she needs appointment. We have not seen her in 6 months. She can schedule a video visit or telephone visit if patient in not able to make it out. Patient needs appointment before refill.

## 2020-10-28 ENCOUNTER — OFFICE VISIT (OUTPATIENT)
Dept: FAMILY MEDICINE CLINIC | Facility: CLINIC | Age: 85
End: 2020-10-28

## 2020-10-28 DIAGNOSIS — Z74.09 PROLONGED IMMOBILIZATION: ICD-10-CM

## 2020-10-28 DIAGNOSIS — F41.9 ANXIETY: Primary | ICD-10-CM

## 2020-10-28 PROCEDURE — 99442 PR PHYS/QHP TELEPHONE EVALUATION 11-20 MIN: CPT | Performed by: FAMILY MEDICINE

## 2020-10-28 RX ORDER — CLONAZEPAM 0.5 MG/1
0.5 TABLET ORAL DAILY PRN
Qty: 30 TABLET | Refills: 3 | Status: SHIPPED | OUTPATIENT
Start: 2020-10-28 | End: 2021-10-14 | Stop reason: HOSPADM

## 2020-10-28 RX ORDER — CLONAZEPAM 0.5 MG/1
0.5 TABLET ORAL DAILY PRN
Qty: 30 TABLET | Refills: 3 | Status: SHIPPED | OUTPATIENT
Start: 2020-10-28 | End: 2020-10-28

## 2020-10-28 NOTE — PROGRESS NOTES
Subjective   Shira Davila is a 89 y.o. female.       Consent given    Time 15 min    Telephone visit     CC: anxiety is stable on medication.    History of Present Illness   Anxiety is stable on medications.    The following portions of the patient's history were reviewed and updated as appropriate: allergies, current medications, past family history, past medical history, past social history, past surgical history and problem list.    Past Medical History:   Diagnosis Date   • Anxiety    • Arthritis    • CHF (congestive heart failure) (CMS/[facility])    • Coronary artery disease    • Disease of thyroid gland 3/16/2016   • Diverticulitis    • Hypertension        Past Surgical History:   Procedure Laterality Date   • ABDOMINAL SURGERY      liban   • APPENDECTOMY     • COLON SURGERY      fistulectomy   • EYE SURGERY      cataract   • HYSTERECTOMY     • TONSILLECTOMY         Family History   Problem Relation Age of Onset   • Hypertension Other        Social History     Socioeconomic History   • Marital status: Single     Spouse name: Not on file   • Number of children: Not on file   • Years of education: Not on file   • Highest education level: Not on file   Tobacco Use   • Smoking status: Never Smoker   • Smokeless tobacco: Never Used   Substance and Sexual Activity   • Alcohol use: No   • Drug use: No   • Sexual activity: Defer       Current Outpatient Medications on File Prior to Visit   Medication Sig Dispense Refill   • aspirin 81 MG tablet Take 81 mg by mouth Daily.     • furosemide (LASIX) 20 MG tablet TAKE 1 TABLET EVERY DAY 90 tablet 2   • furosemide (LASIX) 20 MG tablet Take 20 mg by mouth Daily.     • nystatin-zinc oxide Apply to bid to skin 100 g 11   • pyridoxine (B6 NATURAL) 100 MG tablet Take 100 mg by mouth Daily.     • solifenacin (VESICARE) 5 MG tablet TAKE 1 TABLET DAILY 90 tablet 1   • VESICARE 5 MG tablet      • vitamin B-12 (CYANOCOBALAMIN) 500 MCG tablet Take 500 mcg by mouth Daily.     •  [DISCONTINUED] clonazePAM (KlonoPIN) 0.5 MG tablet Take 1 tablet by mouth Daily As Needed for Anxiety or Seizures. 3 tablet 0     No current facility-administered medications on file prior to visit.        Review of Systems   Psychiatric/Behavioral: The patient is nervous/anxious.        Recent Results (from the past 4704 hour(s))   CBC AND DIFFERENTIAL    Collection Time: 04/30/20 12:38 AM    Specimen type and source: Whole Blood, Blood   Result Value Ref Range    WBC 7.63 4.5 - 11.0 10*3/uL    RBC 4.67 4.0 - 5.2 10*6/uL    Hemoglobin 13.5 12.0 - 16.0 g/dL    Hematocrit 42.2 36.0 - 46.0 %    MCV 90.4 80.0 - 100.0 fL    MCH 28.9 26.0 - 34.0 pg    MCHC 32.0 31.0 - 37.0 g/dL    RDW 15.0 12.0 - 16.8 %    Platelets 204 140 - 440 10*3/uL    MPV 11.4 (H) 6.7 - 10.8 fL    Differential Type Hospital CBC w/AutoDiff (arb'U)    Neutrophil Rel % 69.1 45 - 80 %    Lymphocyte Rel % 20.6 15 - 50 %    Monocyte Rel % 6.8 0 - 15 %    Eosinophil % 2.9 0 - 7 %    Basophil Rel % 0.5 0 - 2 %    Immature Grans % 0.1 (H) 0 %    nRBC 0 0 /100(WBC)    Neutrophils Absolute 5.27 2.0 - 8.8 10*3/uL    Lymphocytes Absolute 1.57 0.7 - 5.5 10*3/uL    Monocytes Absolute 0.52 0.0 - 1.7 10*3/uL    Eosinophils Absolute 0.22 0.0 - 0.8 10*3/uL    Basophils Absolute 0.04 0.0 - 0.2 10*3/uL    Immature Grans, Absolute 0.01 <1 10*3/uL   COMPREHENSIVE METABOLIC PANEL    Collection Time: 04/30/20 12:38 AM    Specimen: Fresh Frozen Plasma    Specimen type and source: Plasma, Blood   Result Value Ref Range    Sodium 138 137 - 145 mmol/L    Potassium 4.2 3.5 - 5.1 mmol/L    Chloride 106 98 - 107 mmol/L    Total CO2 28 22 - 30 mmol/L    Glucose 94 74 - 99 mg/dL    BUN 27 (H) 7 - 20 mg/dL    Creatinine 1.1 0.7 - 1.5 mg/dL    BUN/Creatinine Ratio 24.5 RATIO    Est GFR by Clearance 46 (L) >60 /1.73 m2    Total Protein 7.1 6.3 - 8.2 g/dL    Albumin 3.9 3.5 - 5.0 g/dL    Globulin 3.2 1.5 - 4.5 g/dL    A/G Ratio 1.2 1.1 - 2.5 RATIO    Calcium 9.5 8.4 - 10.2 mg/dL     Total Bilirubin 0.6 0.2 - 1.3 mg/dL    AST (SGOT) 25 15 - 46 U/L    ALT (SGPT) 16 0 - 35 U/L    Alkaline Phosphatase 56 38 - 126 U/L   LIPASE    Collection Time: 04/30/20 12:38 AM    Specimen: Fresh Frozen Plasma    Specimen type and source: Plasma, Blood   Result Value Ref Range    Lipase 122 23 - 300 U/L   LACTIC ACID, PLASMA    Collection Time: 04/30/20 12:38 AM    Specimen: Fresh Frozen Plasma    Specimen type and source: Plasma, Blood   Result Value Ref Range    Lactate 0.7 0.7 - 2.0 mmol/L   TROPONIN (IN-HOUSE)    Collection Time: 04/30/20 12:38 AM    Specimen: Fresh Frozen Plasma    Specimen type and source: Plasma, Blood   Result Value Ref Range    Troponin I 0.019 0.000 - 0.034 ng/mL   Culture, Routine - Swab, Leg, Right    Collection Time: 08/31/20  4:17 PM    Specimen: Leg, Right; Swab   Result Value Ref Range    Culture Final report     Result 1 Mixed skin linda      Objective   There were no vitals filed for this visit.  There is no height or weight on file to calculate BMI.  Physical Exam      Diagnoses and all orders for this visit:    1. Anxiety (Primary)  -     Discontinue: clonazePAM (KlonoPIN) 0.5 MG tablet; Take 1 tablet by mouth Daily As Needed for Anxiety.  Dispense: 30 tablet; Refill: 3  -     clonazePAM (KlonoPIN) 0.5 MG tablet; Take 1 tablet by mouth Daily As Needed for Anxiety.  Dispense: 30 tablet; Refill: 3    Return if symptoms worsen or fail to improve.

## 2020-10-30 ENCOUNTER — TELEPHONE (OUTPATIENT)
Dept: FAMILY MEDICINE CLINIC | Facility: CLINIC | Age: 85
End: 2020-10-30

## 2020-10-30 NOTE — TELEPHONE ENCOUNTER
Meli from [facility] called, she said she had already spoke to Caitlyn but, they can take the patient under the following guidelines.  She said medicare won't pay for a stand alone  to come out by themselves.  There has to be nursing or PT assigned as well along with  Social work services.  Also, patient's last visit was a telephone visit, she needs to have a face to face visit and there needs to be a diagnosis listed.  If you have any questions/concerns her number is [phone].

## 2020-11-06 ENCOUNTER — TELEPHONE (OUTPATIENT)
Dept: FAMILY MEDICINE CLINIC | Facility: CLINIC | Age: 85
End: 2020-11-06

## 2020-11-06 NOTE — TELEPHONE ENCOUNTER
PT IS CALLING IN STATING THAT SHE TALKED TO DR OROSCO ASSISTANT A FEW DAYS AGO.  SHE WAS TOLD THAT SOMEONE WOULD CONTACT HER ABOUT SENDING SOMEONE TO HER HOME TO TALK TO HER ABOUT HER DIET/FOOD.  PT SAID THAT ALL SHE EATS IS FAST FOOD.  PT SAID THAT SHE DOES NOT ANSWER HER PHONE MUCH SO SHE WANTS TO KNOW WHAT TIME SOMEONE WILL BE CALLING HER BACK TO SPEAK TO HER ABOUT THIS.  PT DOES NOT WANT MEALS ON WHEELS. PT DOES NOT ANSWER HER PHONE IN THE MORNING BECAUSE SHE HAS TOO MANY CALLS THAT COME IN.  PT WANTS TO BE CALLED AFTER 2PM.    PT CALL

## 2020-11-09 NOTE — TELEPHONE ENCOUNTER
I ordered Home health, please double check with referrals if something worked out. I think they could not acomodate her Home visits for social issues only. Please ask referrals to inform pt one way or the other.

## 2020-11-10 NOTE — TELEPHONE ENCOUNTER
Returned patient call to try to get her scheduled for a face to face visit and patient picked up and hung up phone.

## 2020-11-12 ENCOUNTER — TELEPHONE (OUTPATIENT)
Dept: FAMILY MEDICINE CLINIC | Facility: CLINIC | Age: 85
End: 2020-11-12

## 2020-11-12 ENCOUNTER — OFFICE VISIT (OUTPATIENT)
Dept: FAMILY MEDICINE CLINIC | Facility: CLINIC | Age: 85
End: 2020-11-12

## 2020-11-12 VITALS
WEIGHT: 121 LBS | HEIGHT: 64 IN | HEART RATE: 83 BPM | BODY MASS INDEX: 20.66 KG/M2 | DIASTOLIC BLOOD PRESSURE: 75 MMHG | TEMPERATURE: 97.5 F | SYSTOLIC BLOOD PRESSURE: 116 MMHG | OXYGEN SATURATION: 94 %

## 2020-11-12 DIAGNOSIS — R53.1 WEAKNESS: ICD-10-CM

## 2020-11-12 DIAGNOSIS — Z65.9 POOR SOCIAL SITUATION: ICD-10-CM

## 2020-11-12 DIAGNOSIS — G89.29 CHRONIC BILATERAL LOW BACK PAIN WITH BILATERAL SCIATICA: ICD-10-CM

## 2020-11-12 DIAGNOSIS — R60.0 BILATERAL LEG EDEMA: ICD-10-CM

## 2020-11-12 DIAGNOSIS — Z74.09 POOR MOBILITY: ICD-10-CM

## 2020-11-12 DIAGNOSIS — N18.32 STAGE 3B CHRONIC KIDNEY DISEASE (HCC): ICD-10-CM

## 2020-11-12 DIAGNOSIS — R54 AGE-RELATED PHYSICAL DEBILITY: Primary | ICD-10-CM

## 2020-11-12 DIAGNOSIS — M25.551 PAIN OF BOTH HIP JOINTS: ICD-10-CM

## 2020-11-12 DIAGNOSIS — M54.41 CHRONIC BILATERAL LOW BACK PAIN WITH BILATERAL SCIATICA: ICD-10-CM

## 2020-11-12 DIAGNOSIS — M25.552 PAIN OF BOTH HIP JOINTS: ICD-10-CM

## 2020-11-12 DIAGNOSIS — R53.82 CHRONIC FATIGUE: ICD-10-CM

## 2020-11-12 DIAGNOSIS — M54.42 CHRONIC BILATERAL LOW BACK PAIN WITH BILATERAL SCIATICA: ICD-10-CM

## 2020-11-12 DIAGNOSIS — I50.22 CHRONIC SYSTOLIC CHF (CONGESTIVE HEART FAILURE) (HCC): ICD-10-CM

## 2020-11-12 DIAGNOSIS — I50.82 BIVENTRICULAR CONGESTIVE HEART FAILURE (HCC): ICD-10-CM

## 2020-11-12 DIAGNOSIS — I25.119 CORONARY ARTERY DISEASE INVOLVING NATIVE HEART WITH ANGINA PECTORIS, UNSPECIFIED VESSEL OR LESION TYPE (HCC): ICD-10-CM

## 2020-11-12 DIAGNOSIS — R53.81 DEBILITY: ICD-10-CM

## 2020-11-12 DIAGNOSIS — I10 BENIGN ESSENTIAL HTN: Primary | ICD-10-CM

## 2020-11-12 DIAGNOSIS — R63.4 WEIGHT LOSS: ICD-10-CM

## 2020-11-12 PROCEDURE — 99214 OFFICE O/P EST MOD 30 MIN: CPT | Performed by: FAMILY MEDICINE

## 2020-11-12 NOTE — PROGRESS NOTES
Subjective   Shira Davila is a 89 y.o. female.     Chief Complaint   Patient presents with   • Edema     Both legs    • Face to face        History of Present Illness   Ms. Frye is here today for follow-up.  She is a very elderly debilitated lady of 89 years old be.  With multiple medical complaints and issues.  CHF at this time is stable on no medications.  Hypertension stable on no medications.  Anxiety at this time is controlled on Klonopin.  She is also on multiple supplements and vitamins.    This is complaining on a chronic bilateral lower extremities which is is chronic and stable at this time she is on every day Lasix for it.    She is complaining on multiple skin tears however by the appointment today they are all healed up.  She has been gradually losing weight and unable to ambulate by herself.  Patient will greatly benefit from home health services with the physical and Occupational Therapy as well as  to be called to evaluate her living situation      The following portions of the patient's history were reviewed and updated as appropriate: allergies, current medications, past family history, past medical history, past social history, past surgical history and problem list.    Past Medical History:   Diagnosis Date   • Anxiety    • Arthritis    • CHF (congestive heart failure) (CMS/HCC)    • Coronary artery disease    • Disease of thyroid gland 3/16/2016   • Diverticulitis    • Hypertension        Past Surgical History:   Procedure Laterality Date   • ABDOMINAL SURGERY      liban   • APPENDECTOMY     • COLON SURGERY      fistulectomy   • EYE SURGERY      cataract   • HYSTERECTOMY     • TONSILLECTOMY         Family History   Problem Relation Age of Onset   • Hypertension Other        Social History     Socioeconomic History   • Marital status: Single     Spouse name: Not on file   • Number of children: Not on file   • Years of education: Not on file   • Highest education  "level: Not on file   Tobacco Use   • Smoking status: Never Smoker   • Smokeless tobacco: Never Used   Substance and Sexual Activity   • Alcohol use: No   • Drug use: No   • Sexual activity: Defer       Current Outpatient Medications on File Prior to Visit   Medication Sig Dispense Refill   • aspirin 81 MG tablet Take 81 mg by mouth Daily.     • clonazePAM (KlonoPIN) 0.5 MG tablet Take 1 tablet by mouth Daily As Needed for Anxiety. 30 tablet 3   • furosemide (LASIX) 20 MG tablet TAKE 1 TABLET EVERY DAY 90 tablet 2   • nystatin-zinc oxide Apply to bid to skin 100 g 11   • pyridoxine (B6 NATURAL) 100 MG tablet Take 100 mg by mouth Daily.     • solifenacin (VESICARE) 5 MG tablet TAKE 1 TABLET DAILY 90 tablet 1   • vitamin B-12 (CYANOCOBALAMIN) 500 MCG tablet Take 500 mcg by mouth Daily.     • [DISCONTINUED] furosemide (LASIX) 20 MG tablet Take 20 mg by mouth Daily.     • [DISCONTINUED] VESICARE 5 MG tablet        No current facility-administered medications on file prior to visit.        Review of Systems   Constitutional: Positive for fatigue.   HENT: Positive for hearing loss.    Respiratory: Negative.    Cardiovascular: Negative.    Gastrointestinal: Negative.    Skin:        Skin bruising   Psychiatric/Behavioral: Positive for sleep disturbance.       Recent Results (from the past 4704 hour(s))   Culture, Routine - Swab, Leg, Right    Collection Time: 08/31/20  4:17 PM    Specimen: Leg, Right; Swab   Result Value Ref Range    Culture Final report     Result 1 Mixed skin linda      Objective   Vitals:    11/12/20 1415   BP: 116/75   Pulse: 83   Temp: 97.5 °F (36.4 °C)   SpO2: 94%   Weight: 54.9 kg (121 lb)   Height: 162.6 cm (64.02\")     Body mass index is 20.76 kg/m².  Physical Exam  Vitals signs and nursing note reviewed.   Constitutional:       General: She is not in acute distress.     Appearance: She is well-developed. She is not diaphoretic.   Cardiovascular:      Rate and Rhythm: Normal rate and regular " rhythm.   Pulmonary:      Effort: Pulmonary effort is normal. No respiratory distress.      Breath sounds: Normal breath sounds. No wheezing.           Diagnoses and all orders for this visit:    1. Benign essential HTN (Primary)    2. Chronic systolic CHF (congestive heart failure) (CMS/HCC)    3. Coronary artery disease involving native heart with angina pectoris, unspecified vessel or lesion type (CMS/HCC)    4. Pain of both hip joints    5. Chronic bilateral low back pain with bilateral sciatica    6. Debility    7. Stage 3b chronic kidney disease    8. Poor social situation    9. Weakness    10. Poor mobility    11. Weight loss    12. Chronic fatigue    13. Bilateral leg edema    Continue current medications.  Home health and  will be called.    Patient will greatly benefit from home health services with the physical and Occupational Therapy as well as  to be called to evaluate her living situation

## 2020-11-12 NOTE — TELEPHONE ENCOUNTER
Meli from [facility] called and states at this time they are unable to accept the patient due to staffing issues.

## 2020-11-12 NOTE — TELEPHONE ENCOUNTER
Jessica with [facility] called and said that they cannot accept the referral because nutrition/food is not a skilled need

## 2021-01-11 ENCOUNTER — TELEPHONE (OUTPATIENT)
Dept: FAMILY MEDICINE CLINIC | Facility: CLINIC | Age: 86
End: 2021-01-11

## 2021-01-11 NOTE — TELEPHONE ENCOUNTER
Patient was given an antibiotic cephalexin 500 mg. Patient can not take this medication. Patient wanted me to reach out to [facility] pharmacy ( [facility]) phone: [phone]

## 2021-01-12 NOTE — TELEPHONE ENCOUNTER
Ok for hub to relay message:    Per dr. Rincon, stop keflex and take over the counter imodium. This will help with diarrhea. Drinks lots of fluids like gatorade/gingerale.

## 2021-03-05 ENCOUNTER — APPOINTMENT (OUTPATIENT)
Dept: GENERAL RADIOLOGY | Facility: HOSPITAL | Age: 86
End: 2021-03-05

## 2021-03-05 PROCEDURE — 73560 X-RAY EXAM OF KNEE 1 OR 2: CPT | Performed by: FAMILY MEDICINE

## 2021-03-10 ENCOUNTER — TELEPHONE (OUTPATIENT)
Dept: FAMILY MEDICINE CLINIC | Facility: CLINIC | Age: 86
End: 2021-03-10

## 2021-03-10 NOTE — TELEPHONE ENCOUNTER
PATIENT  REQUEST CALL BACK. PATIENT HAS BEEN TO URGENT CARE AND NEEDS TO FOLLOW UP.  PATIENT IS VERY NERVOUS AND NOT FEELING WELL AT ALL.      PLEASE CALL ASAP: [phone]

## 2021-03-10 NOTE — TELEPHONE ENCOUNTER
Spoke with patient and she is want to go back nursing facility and would like to speak with  or [place].  I informed her both were out of the office until tomorrow but I would give the message.  I also informed her that if she feels she needs another tonight to call 911.  She stated that she lives alone.

## 2021-03-11 ENCOUNTER — TELEPHONE (OUTPATIENT)
Dept: FAMILY MEDICINE CLINIC | Facility: CLINIC | Age: 86
End: 2021-03-11

## 2021-03-11 DIAGNOSIS — Z74.09 POOR MOBILITY: Primary | ICD-10-CM

## 2021-03-11 DIAGNOSIS — M25.552 PAIN OF BOTH HIP JOINTS: Primary | ICD-10-CM

## 2021-03-11 DIAGNOSIS — M25.551 PAIN OF BOTH HIP JOINTS: Primary | ICD-10-CM

## 2021-03-11 DIAGNOSIS — R53.81 DEBILITY: ICD-10-CM

## 2021-03-11 NOTE — TELEPHONE ENCOUNTER
Called pt: she can not ambulate, can not fix her own food. Will send a  to her her house.   (0) independent

## 2021-03-11 NOTE — TELEPHONE ENCOUNTER
Meli with [facility] called and stated they received the referral, but it cannot be just for . The referral will have to be for skilled nursing, physical therapy, speech, ect. For them to go out.

## 2021-03-25 ENCOUNTER — OUTSIDE FACILITY SERVICE (OUTPATIENT)
Dept: FAMILY MEDICINE CLINIC | Facility: CLINIC | Age: 86
End: 2021-03-25

## 2021-03-25 PROCEDURE — G0180 MD CERTIFICATION HHA PATIENT: HCPCS | Performed by: FAMILY MEDICINE

## 2021-03-26 ENCOUNTER — TELEPHONE (OUTPATIENT)
Dept: FAMILY MEDICINE CLINIC | Facility: CLINIC | Age: 86
End: 2021-03-26

## 2021-03-26 NOTE — TELEPHONE ENCOUNTER
Patient called and stated she only has blue cross and blue shield.    When asked what she was needing help.    Patient states she needs to go to nursing home.    Patient stated that she talked to someone hear but did not know that name but stated that her doctor has to order for her to go to nursing home.    Patient stated she needs help.  Limping around and not able to make food.

## 2021-03-26 NOTE — TELEPHONE ENCOUNTER
Ok for hub to relay message:    Tried to call patient to let patient know that we can not put orders for nursing home. We do have home health coming out to the house.

## 2021-05-03 RX ORDER — SOLIFENACIN SUCCINATE 5 MG/1
TABLET, FILM COATED ORAL
Qty: 90 TABLET | Refills: 1 | Status: SHIPPED | OUTPATIENT
Start: 2021-05-03 | End: 2021-11-26

## 2021-08-19 ENCOUNTER — HOSPITAL ENCOUNTER (EMERGENCY)
Facility: HOSPITAL | Age: 86
Discharge: LEFT BEFORE DISCHARGE | End: 2021-08-19

## 2021-08-19 VITALS
HEART RATE: 79 BPM | OXYGEN SATURATION: 97 % | HEIGHT: 64 IN | SYSTOLIC BLOOD PRESSURE: 137 MMHG | WEIGHT: 125 LBS | TEMPERATURE: 98.6 F | BODY MASS INDEX: 21.34 KG/M2 | RESPIRATION RATE: 16 BRPM | DIASTOLIC BLOOD PRESSURE: 81 MMHG

## 2021-08-19 PROCEDURE — 99211 OFF/OP EST MAY X REQ PHY/QHP: CPT

## 2021-08-19 NOTE — ED NOTES
According to registration, they called pt to register them and they said they decided to leave.      Debbie Conn, RN  08/19/21 5826

## 2021-08-19 NOTE — ED NOTES
Pt states that she called all the people that she knew because he was having a panic attack. Pt reports having them several years ago. Pt is anxious and wants to talk to someone about her living circumstances.      Ashleigh Jackson RN  08/19/21 1682

## 2021-08-19 NOTE — ED NOTES
Patient from home with c/o anxiety attack that happened today. Patient has hx of anxiety and is not treated for anxiety.      Blanquita Porter, RN  08/19/21 9303

## 2021-09-30 ENCOUNTER — TELEPHONE (OUTPATIENT)
Dept: FAMILY MEDICINE CLINIC | Facility: CLINIC | Age: 86
End: 2021-09-30

## 2021-09-30 DIAGNOSIS — R60.0 BILATERAL LEG EDEMA: Primary | ICD-10-CM

## 2021-09-30 RX ORDER — POTASSIUM CHLORIDE 750 MG/1
10 TABLET, EXTENDED RELEASE ORAL 2 TIMES DAILY
Qty: 60 TABLET | Refills: 3 | Status: SHIPPED | OUTPATIENT
Start: 2021-09-30 | End: 2021-10-14 | Stop reason: HOSPADM

## 2021-09-30 RX ORDER — FUROSEMIDE 20 MG/1
20 TABLET ORAL DAILY
Qty: 90 TABLET | Refills: 6 | Status: SHIPPED | OUTPATIENT
Start: 2021-09-30 | End: 2021-10-14 | Stop reason: HOSPADM

## 2021-09-30 NOTE — TELEPHONE ENCOUNTER
Ok for hub to relay message:    Returned adin's call ( home health nurse).    Per provider:    Lasix was refilled, to give up to 3 times a day as needed to get swelling down , potassium to be takes as well as many as Lasix. ER only if swelling goes up to groin.     Attempt to call patient, phone continue to ring.

## 2021-09-30 NOTE — TELEPHONE ENCOUNTER
Annamarie (Caregiver w/[facility]) calling stated pt is having severe swelling in both legs below knee, mainly calf & foot area and wants to know from Dr Rincon if she suggest pt going to ER  Please call Annamarie  to advise

## 2021-10-05 ENCOUNTER — HOSPITAL ENCOUNTER (INPATIENT)
Facility: HOSPITAL | Age: 86
LOS: 9 days | Discharge: NURSING, REHAB OR LONG-TERM CARE | End: 2021-10-14
Attending: EMERGENCY MEDICINE | Admitting: INTERNAL MEDICINE

## 2021-10-05 ENCOUNTER — APPOINTMENT (OUTPATIENT)
Dept: CT IMAGING | Facility: HOSPITAL | Age: 86
End: 2021-10-05

## 2021-10-05 ENCOUNTER — APPOINTMENT (OUTPATIENT)
Dept: CARDIOLOGY | Facility: HOSPITAL | Age: 86
End: 2021-10-05

## 2021-10-05 ENCOUNTER — APPOINTMENT (OUTPATIENT)
Dept: GENERAL RADIOLOGY | Facility: HOSPITAL | Age: 86
End: 2021-10-05

## 2021-10-05 ENCOUNTER — TELEPHONE (OUTPATIENT)
Dept: FAMILY MEDICINE CLINIC | Facility: CLINIC | Age: 86
End: 2021-10-05

## 2021-10-05 DIAGNOSIS — I48.91 ATRIAL FIBRILLATION WITH RAPID VENTRICULAR RESPONSE (HCC): ICD-10-CM

## 2021-10-05 DIAGNOSIS — I82.4Y1 DVT, LOWER EXTREMITY, PROXIMAL, ACUTE, RIGHT (HCC): ICD-10-CM

## 2021-10-05 DIAGNOSIS — R91.8 LUNG INFILTRATE: Primary | ICD-10-CM

## 2021-10-05 DIAGNOSIS — F41.9 ANXIETY: ICD-10-CM

## 2021-10-05 DIAGNOSIS — I50.9 ACUTE CONGESTIVE HEART FAILURE, UNSPECIFIED HEART FAILURE TYPE (HCC): Primary | ICD-10-CM

## 2021-10-05 PROBLEM — I82.419 DEEP VEIN THROMBOSIS (DVT) OF FEMORAL VEIN (HCC): Status: ACTIVE | Noted: 2021-10-05

## 2021-10-05 LAB
ALBUMIN SERPL-MCNC: 4 G/DL (ref 3.5–5.2)
ALBUMIN/GLOB SERPL: 1.7 G/DL
ALP SERPL-CCNC: 75 U/L (ref 39–117)
ALT SERPL W P-5'-P-CCNC: 43 U/L (ref 1–33)
ANION GAP SERPL CALCULATED.3IONS-SCNC: 9.9 MMOL/L (ref 5–15)
AST SERPL-CCNC: 32 U/L (ref 1–32)
BASOPHILS # BLD AUTO: 0.03 10*3/MM3 (ref 0–0.2)
BASOPHILS NFR BLD AUTO: 0.4 % (ref 0–1.5)
BH CV LOW VAS RIGHT DISTAL FEMORAL SPONT: 1
BH CV LOW VAS RIGHT MID FEMORAL SPONT: 1
BH CV LOWER VASCULAR LEFT COMMON FEMORAL AUGMENT: NORMAL
BH CV LOWER VASCULAR LEFT COMMON FEMORAL COMPETENT: NORMAL
BH CV LOWER VASCULAR LEFT COMMON FEMORAL COMPRESS: NORMAL
BH CV LOWER VASCULAR LEFT COMMON FEMORAL PHASIC: NORMAL
BH CV LOWER VASCULAR LEFT COMMON FEMORAL SPONT: NORMAL
BH CV LOWER VASCULAR RIGHT COMMON FEMORAL AUGMENT: NORMAL
BH CV LOWER VASCULAR RIGHT COMMON FEMORAL COMPETENT: NORMAL
BH CV LOWER VASCULAR RIGHT COMMON FEMORAL COMPRESS: NORMAL
BH CV LOWER VASCULAR RIGHT COMMON FEMORAL PHASIC: NORMAL
BH CV LOWER VASCULAR RIGHT COMMON FEMORAL SPONT: NORMAL
BH CV LOWER VASCULAR RIGHT DISTAL FEMORAL AUGMENT: NORMAL
BH CV LOWER VASCULAR RIGHT DISTAL FEMORAL COMPETENT: NORMAL
BH CV LOWER VASCULAR RIGHT DISTAL FEMORAL COMPRESS: NORMAL
BH CV LOWER VASCULAR RIGHT DISTAL FEMORAL PHASIC: NORMAL
BH CV LOWER VASCULAR RIGHT DISTAL FEMORAL SPONT: NORMAL
BH CV LOWER VASCULAR RIGHT GASTRONEMIUS COMPRESS: NORMAL
BH CV LOWER VASCULAR RIGHT GREATER SAPH AK COMPRESS: NORMAL
BH CV LOWER VASCULAR RIGHT GREATER SAPH BK COMPRESS: NORMAL
BH CV LOWER VASCULAR RIGHT LESSER SAPH COMPRESS: NORMAL
BH CV LOWER VASCULAR RIGHT MID FEMORAL AUGMENT: NORMAL
BH CV LOWER VASCULAR RIGHT MID FEMORAL COMPETENT: NORMAL
BH CV LOWER VASCULAR RIGHT MID FEMORAL COMPRESS: NORMAL
BH CV LOWER VASCULAR RIGHT MID FEMORAL PHASIC: NORMAL
BH CV LOWER VASCULAR RIGHT MID FEMORAL SPONT: NORMAL
BH CV LOWER VASCULAR RIGHT MID FEMORAL THROMBUS: NORMAL
BH CV LOWER VASCULAR RIGHT PERONEAL COMPRESS: NORMAL
BH CV LOWER VASCULAR RIGHT POPLITEAL AUGMENT: NORMAL
BH CV LOWER VASCULAR RIGHT POPLITEAL COMPETENT: NORMAL
BH CV LOWER VASCULAR RIGHT POPLITEAL COMPRESS: NORMAL
BH CV LOWER VASCULAR RIGHT POPLITEAL PHASIC: NORMAL
BH CV LOWER VASCULAR RIGHT POPLITEAL SPONT: NORMAL
BH CV LOWER VASCULAR RIGHT POSTERIOR TIBIAL COMPRESS: NORMAL
BH CV LOWER VASCULAR RIGHT PROFUNDA FEMORAL COMPRESS: NORMAL
BH CV LOWER VASCULAR RIGHT PROXIMAL FEMORAL COMPRESS: NORMAL
BH CV LOWER VASCULAR RIGHT SAPHENOFEMORAL JUNCTION COMPRESS: NORMAL
BILIRUB SERPL-MCNC: 0.4 MG/DL (ref 0–1.2)
BUN SERPL-MCNC: 24 MG/DL (ref 8–23)
BUN/CREAT SERPL: 19.2 (ref 7–25)
CALCIUM SPEC-SCNC: 9.5 MG/DL (ref 8.2–9.6)
CHLORIDE SERPL-SCNC: 103 MMOL/L (ref 98–107)
CO2 SERPL-SCNC: 26.1 MMOL/L (ref 22–29)
CREAT SERPL-MCNC: 1.25 MG/DL (ref 0.57–1)
DEPRECATED RDW RBC AUTO: 42.6 FL (ref 37–54)
EOSINOPHIL # BLD AUTO: 0.04 10*3/MM3 (ref 0–0.4)
EOSINOPHIL NFR BLD AUTO: 0.6 % (ref 0.3–6.2)
ERYTHROCYTE [DISTWIDTH] IN BLOOD BY AUTOMATED COUNT: 13.3 % (ref 12.3–15.4)
GFR SERPL CREATININE-BSD FRML MDRD: 39 ML/MIN/1.73
GLOBULIN UR ELPH-MCNC: 2.3 GM/DL
GLUCOSE SERPL-MCNC: 107 MG/DL (ref 65–99)
HCT VFR BLD AUTO: 37 % (ref 34–46.6)
HGB BLD-MCNC: 12.2 G/DL (ref 12–15.9)
IMM GRANULOCYTES # BLD AUTO: 0.02 10*3/MM3 (ref 0–0.05)
IMM GRANULOCYTES NFR BLD AUTO: 0.3 % (ref 0–0.5)
INR PPP: 1.1 (ref 0.9–1.1)
LYMPHOCYTES # BLD AUTO: 1.18 10*3/MM3 (ref 0.7–3.1)
LYMPHOCYTES NFR BLD AUTO: 17.1 % (ref 19.6–45.3)
MAGNESIUM SERPL-MCNC: 2.1 MG/DL (ref 1.7–2.3)
MAXIMAL PREDICTED HEART RATE: 121 BPM
MCH RBC QN AUTO: 28.9 PG (ref 26.6–33)
MCHC RBC AUTO-ENTMCNC: 33 G/DL (ref 31.5–35.7)
MCV RBC AUTO: 87.7 FL (ref 79–97)
MONOCYTES # BLD AUTO: 0.64 10*3/MM3 (ref 0.1–0.9)
MONOCYTES NFR BLD AUTO: 9.3 % (ref 5–12)
NEUTROPHILS NFR BLD AUTO: 4.98 10*3/MM3 (ref 1.7–7)
NEUTROPHILS NFR BLD AUTO: 72.3 % (ref 42.7–76)
NRBC BLD AUTO-RTO: 0 /100 WBC (ref 0–0.2)
NT-PROBNP SERPL-MCNC: ABNORMAL PG/ML (ref 0–1800)
PLATELET # BLD AUTO: 184 10*3/MM3 (ref 140–450)
PMV BLD AUTO: 11 FL (ref 6–12)
POTASSIUM SERPL-SCNC: 4.2 MMOL/L (ref 3.5–5.2)
PROT SERPL-MCNC: 6.3 G/DL (ref 6–8.5)
PROTHROMBIN TIME: 14 SECONDS (ref 11.7–14.2)
QT INTERVAL: 356 MS
RBC # BLD AUTO: 4.22 10*6/MM3 (ref 3.77–5.28)
SARS-COV-2 RNA PNL SPEC NAA+PROBE: NOT DETECTED
SODIUM SERPL-SCNC: 139 MMOL/L (ref 136–145)
STRESS TARGET HR: 103 BPM
TROPONIN T SERPL-MCNC: 0.04 NG/ML (ref 0–0.03)
WBC # BLD AUTO: 6.89 10*3/MM3 (ref 3.4–10.8)

## 2021-10-05 PROCEDURE — 25010000002 FUROSEMIDE PER 20 MG: Performed by: EMERGENCY MEDICINE

## 2021-10-05 PROCEDURE — 0 IOPAMIDOL PER 1 ML: Performed by: EMERGENCY MEDICINE

## 2021-10-05 PROCEDURE — 83880 ASSAY OF NATRIURETIC PEPTIDE: CPT | Performed by: EMERGENCY MEDICINE

## 2021-10-05 PROCEDURE — 87635 SARS-COV-2 COVID-19 AMP PRB: CPT | Performed by: EMERGENCY MEDICINE

## 2021-10-05 PROCEDURE — 25010000002 ENOXAPARIN PER 10 MG: Performed by: EMERGENCY MEDICINE

## 2021-10-05 PROCEDURE — 85025 COMPLETE CBC W/AUTO DIFF WBC: CPT | Performed by: EMERGENCY MEDICINE

## 2021-10-05 PROCEDURE — 93971 EXTREMITY STUDY: CPT

## 2021-10-05 PROCEDURE — 85610 PROTHROMBIN TIME: CPT | Performed by: EMERGENCY MEDICINE

## 2021-10-05 PROCEDURE — 99285 EMERGENCY DEPT VISIT HI MDM: CPT

## 2021-10-05 PROCEDURE — 93010 ELECTROCARDIOGRAM REPORT: CPT | Performed by: INTERNAL MEDICINE

## 2021-10-05 PROCEDURE — 93005 ELECTROCARDIOGRAM TRACING: CPT | Performed by: EMERGENCY MEDICINE

## 2021-10-05 PROCEDURE — 83735 ASSAY OF MAGNESIUM: CPT | Performed by: EMERGENCY MEDICINE

## 2021-10-05 PROCEDURE — 71045 X-RAY EXAM CHEST 1 VIEW: CPT

## 2021-10-05 PROCEDURE — 84484 ASSAY OF TROPONIN QUANT: CPT | Performed by: EMERGENCY MEDICINE

## 2021-10-05 PROCEDURE — 71275 CT ANGIOGRAPHY CHEST: CPT

## 2021-10-05 PROCEDURE — 80053 COMPREHEN METABOLIC PANEL: CPT | Performed by: EMERGENCY MEDICINE

## 2021-10-05 RX ORDER — UREA 10 %
3 LOTION (ML) TOPICAL NIGHTLY PRN
Status: DISCONTINUED | OUTPATIENT
Start: 2021-10-05 | End: 2021-10-14 | Stop reason: HOSPADM

## 2021-10-05 RX ORDER — FUROSEMIDE 10 MG/ML
20 INJECTION INTRAMUSCULAR; INTRAVENOUS DAILY
Status: DISCONTINUED | OUTPATIENT
Start: 2021-10-06 | End: 2021-10-07

## 2021-10-05 RX ORDER — FUROSEMIDE 10 MG/ML
40 INJECTION INTRAMUSCULAR; INTRAVENOUS ONCE
Status: COMPLETED | OUTPATIENT
Start: 2021-10-05 | End: 2021-10-05

## 2021-10-05 RX ORDER — ASPIRIN 81 MG/1
81 TABLET, CHEWABLE ORAL DAILY
Status: DISCONTINUED | OUTPATIENT
Start: 2021-10-06 | End: 2021-10-14 | Stop reason: HOSPADM

## 2021-10-05 RX ORDER — FUROSEMIDE 20 MG/1
20 TABLET ORAL DAILY
Status: DISCONTINUED | OUTPATIENT
Start: 2021-10-06 | End: 2021-10-05

## 2021-10-05 RX ORDER — OXYBUTYNIN CHLORIDE 5 MG/1
5 TABLET, EXTENDED RELEASE ORAL DAILY
Refills: 1 | Status: DISCONTINUED | OUTPATIENT
Start: 2021-10-06 | End: 2021-10-06

## 2021-10-05 RX ORDER — POTASSIUM CHLORIDE 750 MG/1
10 TABLET, FILM COATED, EXTENDED RELEASE ORAL 2 TIMES DAILY
Status: DISCONTINUED | OUTPATIENT
Start: 2021-10-05 | End: 2021-10-11

## 2021-10-05 RX ORDER — ONDANSETRON 2 MG/ML
4 INJECTION INTRAMUSCULAR; INTRAVENOUS EVERY 6 HOURS PRN
Status: DISCONTINUED | OUTPATIENT
Start: 2021-10-05 | End: 2021-10-14 | Stop reason: HOSPADM

## 2021-10-05 RX ORDER — SODIUM CHLORIDE 0.9 % (FLUSH) 0.9 %
10 SYRINGE (ML) INJECTION AS NEEDED
Status: DISCONTINUED | OUTPATIENT
Start: 2021-10-05 | End: 2021-10-14 | Stop reason: HOSPADM

## 2021-10-05 RX ORDER — ACETAMINOPHEN 325 MG/1
650 TABLET ORAL EVERY 4 HOURS PRN
Status: DISCONTINUED | OUTPATIENT
Start: 2021-10-05 | End: 2021-10-14 | Stop reason: HOSPADM

## 2021-10-05 RX ORDER — ONDANSETRON 4 MG/1
4 TABLET, FILM COATED ORAL EVERY 6 HOURS PRN
Status: DISCONTINUED | OUTPATIENT
Start: 2021-10-05 | End: 2021-10-14 | Stop reason: HOSPADM

## 2021-10-05 RX ORDER — NITROGLYCERIN 0.4 MG/1
0.4 TABLET SUBLINGUAL
Status: DISCONTINUED | OUTPATIENT
Start: 2021-10-05 | End: 2021-10-14 | Stop reason: HOSPADM

## 2021-10-05 RX ORDER — CEPHALEXIN 500 MG/1
500 CAPSULE ORAL 3 TIMES DAILY
Qty: 21 CAPSULE | Refills: 0 | Status: SHIPPED | OUTPATIENT
Start: 2021-10-05 | End: 2021-10-14 | Stop reason: HOSPADM

## 2021-10-05 RX ORDER — CLONAZEPAM 0.5 MG/1
0.5 TABLET ORAL DAILY PRN
Status: DISCONTINUED | OUTPATIENT
Start: 2021-10-05 | End: 2021-10-08

## 2021-10-05 RX ADMIN — METOPROLOL TARTRATE 25 MG: 25 TABLET, FILM COATED ORAL at 16:41

## 2021-10-05 RX ADMIN — ENOXAPARIN SODIUM 60 MG: 60 INJECTION, SOLUTION INTRAVENOUS; SUBCUTANEOUS at 21:22

## 2021-10-05 RX ADMIN — FUROSEMIDE 40 MG: 20 INJECTION, SOLUTION INTRAMUSCULAR; INTRAVENOUS at 16:48

## 2021-10-05 RX ADMIN — IOPAMIDOL 95 ML: 755 INJECTION, SOLUTION INTRAVENOUS at 20:34

## 2021-10-05 NOTE — ED NOTES
Pt arrives from [facility] with c/o SOA and bilateral leg edema. [facility] sent her here for further eval.     Patient wearing mask during triage. RN wearing appropriate PPE during triage. Hand hygiene performed.       Melody Holm, RN  10/05/21 1817

## 2021-10-05 NOTE — ED NOTES
Pt reports to not give any information to Carlos Browne, her neighbor. Pt request that we make her nephew her emergency contact. Her nephew is Dr. Corey Cruz, [phone].     Angel Miranda, RN  10/05/21 3729

## 2021-10-05 NOTE — ED PROVIDER NOTES
EMERGENCY DEPARTMENT ENCOUNTER    Room Number:  36/36  Date seen:  10/5/2021  PCP: Aletha Rincon MD  Historian: Patient      HPI:  Chief Complaint: Leg swelling, short of breath  A complete HPI/ROS/PMH/PSH/SH/FH are unobtainable due to: Nothing  Context: Shira Davila is a 89 y.o. female who presents to the ED c/o leg swelling that has been ongoing for about 1 week now.  Last night she reports that she became short of breath, particular when lying down.  She denies significant shortness of breath with walking however she reports that she does not walk very much at her age.  She denies cough, fever, chills.  She denies chest pain currently.  She reports that she has fleeting chest pains from time to time that she attributes to arthritis.  She reports that her right leg has been more swollen than her left.  She reports a previous history of blood clot in her left leg.  She is not currently on blood thinner medications.  She reports that she was diagnosed with congestive heart failure about 10 years ago.  She denies history of atrial fibrillation or irregular heartbeat.  She reports she is also had some mild constipation.  No nausea or vomiting.  She is prescribed furosemide for her congestive heart failure and also solefanacin which she takes for overactive bladder.            PAST MEDICAL HISTORY  Active Ambulatory Problems     Diagnosis Date Noted   • Abdominal pain, vomiting, and diarrhea 03/16/2016   • Chronic constipation 03/16/2016   • Hemorrhoid 03/16/2016   • BP (high blood pressure) 03/16/2016   • Arthralgia of hip 03/16/2016   • LBP (low back pain) 03/16/2016   • Disease of thyroid gland 03/16/2016   • Benign essential HTN 05/28/2017   • Chronic kidney disease, stage III (moderate) (CMS/HCC) 05/28/2017   • Chronic fatigue 05/28/2017   • Chronic systolic CHF (congestive heart failure) (formerly [facility]) 05/28/2017   • Coronary artery disease 05/29/2017   • Colitis 07/27/2019   • Bilateral impacted cerumen 07/29/2019    • Anxiety 09/09/2019   • Fungal infection 01/30/2020   • Poor social situation 01/30/2020   • Leg pain, anterior, right 01/30/2020   • Poor mobility 11/12/2020   • Weight loss 11/12/2020   • Bilateral leg edema 11/12/2020     Resolved Ambulatory Problems     Diagnosis Date Noted   • Colitis presumed infectious (bacterial) 05/28/2017   • Rectal bleeding 05/29/2017   • Abnormal finding in urine 05/29/2017     Past Medical History:   Diagnosis Date   • Arthritis    • CHF (congestive heart failure) (HCC)    • Diverticulitis    • Hypertension          PAST SURGICAL HISTORY  Past Surgical History:   Procedure Laterality Date   • ABDOMINAL SURGERY      liban   • APPENDECTOMY     • COLON SURGERY      fistulectomy   • EYE SURGERY      cataract   • HYSTERECTOMY     • TONSILLECTOMY           FAMILY HISTORY  Family History   Problem Relation Age of Onset   • Hypertension Other          SOCIAL HISTORY  Social History     Socioeconomic History   • Marital status: Single     Spouse name: Not on file   • Number of children: Not on file   • Years of education: Not on file   • Highest education level: Not on file   Tobacco Use   • Smoking status: Never Smoker   • Smokeless tobacco: Never Used   Substance and Sexual Activity   • Alcohol use: No   • Drug use: No   • Sexual activity: Defer         ALLERGIES  Cephalexin, Atorvastatin calcium, Sesame oil, Amoxicillin, Cortisone, Diazepam, Doxycycline, Erythromycin, Horse-derived products, Levoxyl [levothyroxine sodium], Lexapro [escitalopram oxalate], Lipitor [atorvastatin], Lopressor [metoprolol tartrate], Metaxalone, Omeprazole, Penicillins, Sesame seed (diagnostic), Solarcaine [benzocaine], and Sulfa antibiotics        REVIEW OF SYSTEMS  Review of Systems   Review of all 14 systems is negative other than stated in the HPI above.      PHYSICAL EXAM  ED Triage Vitals [10/05/21 1436]   Temp Heart Rate Resp BP SpO2   98 °F (36.7 °C) (!) 121 18 -- 95 %      Temp src Heart Rate Source  Patient Position BP Location FiO2 (%)   Temporal Monitor -- -- --         GENERAL: Awake and alert, frail-appearing, no acute distress  HENT: nares patent  EYES: no scleral icterus  CV: irregular rhythm, mild tachycardia, no murmur appreciated  RESPIRATORY: normal effort, lungs clear auscultation bilaterally  ABDOMEN: soft, nondistended, nontender throughout  MUSCULOSKELETAL: no deformity.  There is 2+ pitting edema of the right lower extremity, 1+ pitting edema left lower extremity.  No erythema or warmth present bilaterally.  There is mild tenderness present particular in the right lower extremity.  Normal cap refill present in the toes bilaterally.  NEURO: alert, moves all extremities, follows commands, baseline hearing impairment  PSYCH:  calm, cooperative  SKIN: warm, dry    Vital signs and nursing notes reviewed.          LAB RESULTS  Recent Results (from the past 24 hour(s))   ECG 12 Lead    Collection Time: 10/05/21  3:19 PM   Result Value Ref Range    QT Interval 356 ms   Comprehensive Metabolic Panel    Collection Time: 10/05/21  3:20 PM    Specimen: Blood   Result Value Ref Range    Glucose 107 (H) 65 - 99 mg/dL    BUN 24 (H) 8 - 23 mg/dL    Creatinine 1.25 (H) 0.57 - 1.00 mg/dL    Sodium 139 136 - 145 mmol/L    Potassium 4.2 3.5 - 5.2 mmol/L    Chloride 103 98 - 107 mmol/L    CO2 26.1 22.0 - 29.0 mmol/L    Calcium 9.5 8.2 - 9.6 mg/dL    Total Protein 6.3 6.0 - 8.5 g/dL    Albumin 4.00 3.50 - 5.20 g/dL    ALT (SGPT) 43 (H) 1 - 33 U/L    AST (SGOT) 32 1 - 32 U/L    Alkaline Phosphatase 75 39 - 117 U/L    Total Bilirubin 0.4 0.0 - 1.2 mg/dL    eGFR Non African Amer 39 (L) >60 mL/min/1.73    Globulin 2.3 gm/dL    A/G Ratio 1.7 g/dL    BUN/Creatinine Ratio 19.2 7.0 - 25.0    Anion Gap 9.9 5.0 - 15.0 mmol/L   Protime-INR    Collection Time: 10/05/21  3:20 PM    Specimen: Blood   Result Value Ref Range    Protime 14.0 11.7 - 14.2 Seconds    INR 1.10 0.90 - 1.10   Troponin    Collection Time: 10/05/21  3:20 PM     Specimen: Blood   Result Value Ref Range    Troponin T 0.045 (C) 0.000 - 0.030 ng/mL   BNP    Collection Time: 10/05/21  3:20 PM    Specimen: Blood   Result Value Ref Range    proBNP 11,205.0 (H) 0.0-1,800.0 pg/mL   CBC Auto Differential    Collection Time: 10/05/21  3:20 PM    Specimen: Blood   Result Value Ref Range    WBC 6.89 3.40 - 10.80 10*3/mm3    RBC 4.22 3.77 - 5.28 10*6/mm3    Hemoglobin 12.2 12.0 - 15.9 g/dL    Hematocrit 37.0 34.0 - 46.6 %    MCV 87.7 79.0 - 97.0 fL    MCH 28.9 26.6 - 33.0 pg    MCHC 33.0 31.5 - 35.7 g/dL    RDW 13.3 12.3 - 15.4 %    RDW-SD 42.6 37.0 - 54.0 fl    MPV 11.0 6.0 - 12.0 fL    Platelets 184 140 - 450 10*3/mm3    Neutrophil % 72.3 42.7 - 76.0 %    Lymphocyte % 17.1 (L) 19.6 - 45.3 %    Monocyte % 9.3 5.0 - 12.0 %    Eosinophil % 0.6 0.3 - 6.2 %    Basophil % 0.4 0.0 - 1.5 %    Immature Grans % 0.3 0.0 - 0.5 %    Neutrophils, Absolute 4.98 1.70 - 7.00 10*3/mm3    Lymphocytes, Absolute 1.18 0.70 - 3.10 10*3/mm3    Monocytes, Absolute 0.64 0.10 - 0.90 10*3/mm3    Eosinophils, Absolute 0.04 0.00 - 0.40 10*3/mm3    Basophils, Absolute 0.03 0.00 - 0.20 10*3/mm3    Immature Grans, Absolute 0.02 0.00 - 0.05 10*3/mm3    nRBC 0.0 0.0 - 0.2 /100 WBC   Magnesium    Collection Time: 10/05/21  3:20 PM    Specimen: Blood   Result Value Ref Range    Magnesium 2.1 1.7 - 2.3 mg/dL   Duplex Venous Lower Extremity - Right CAR    Collection Time: 10/05/21  5:31 PM   Result Value Ref Range    Target HR (85%) 103 bpm    Max. Pred. HR (100%) 121 bpm    Right Mid Femoral Spont 1     Right Distal Femoral Spont 1     Right Common Femoral Spont Y     Right Common Femoral Phasic Y     Right Common Femoral Augment Y     Right Common Femoral Competent Y     Right Common Femoral Compress C     Right Saphenofemoral Junction Compress C     Right Profunda Femoral Compress C     Right Proximal Femoral Compress C     Right Mid Femoral Spont N     Right Mid Femoral Phasic N     Right Mid Femoral Augment Y      Right Mid Femoral Competent N     Right Mid Femoral Compress P     Right Mid Femoral Thrombus A     Right Distal Femoral Spont N     Right Distal Femoral Phasic N     Right Distal Femoral Augment N     Right Distal Femoral Competent N     Right Distal Femoral Compress N     Right Popliteal Spont Y     Right Popliteal Phasic Y     Right Popliteal Augment Y     Right Popliteal Competent Y     Right Popliteal Compress C     Right Posterior Tibial Compress C     Right Peroneal Compress C     Right Gastronemius Compress C     Right Greater Saph AK Compress C     Right Greater Saph BK Compress C     Right Lesser Saph Compress C     Left Common Femoral Spont Y     Left Common Femoral Phasic Y     Left Common Femoral Augment Y     Left Common Femoral Competent Y     Left Common Femoral Compress C    COVID-19,[facility] IN-HOUSE CEPHEID/MOHSEN NP SWAB IN TRANSPORT MEDIA 8-12 HR TAT - Swab, Nasopharynx    Collection Time: 10/05/21  5:57 PM    Specimen: Nasopharynx; Swab   Result Value Ref Range    COVID19 Not Detected Not Detected - Ref. Range       Ordered the above labs and reviewed the results.        RADIOLOGY  XR Chest 1 View    Result Date: 10/5/2021  XR CHEST 1 VW-  HISTORY: Female who is 89 years-old,  short of breath  TECHNIQUE: Frontal view of the chest  COMPARISON: 04/21/2018  FINDINGS: The heart is enlarged. Aorta is tortuous. Pulmonary vasculature is unremarkable. Minimal likely atelectasis or infiltrate at the right base, minimal bilateral pleural effusions. No pneumothorax. No acute osseous process.      Minimal likely atelectasis or infiltrate at the right base, minimal bilateral pleural effusions, follow-up recommended. Cardiomegaly. Tortuous aorta.  This report was finalized on 10/5/2021 3:22 PM by Dr. Gurvinder Carter M.D.      Duplex Venous Lower Extremity - Right CAR    Result Date: 10/5/2021  · Acute right lower extremity deep vein thrombosis noted in the mid femoral and distal femoral. · All other right  sided veins appeared normal.      CT Angiogram Chest    Result Date: 10/5/2021  CT ANGIOGRAM CHEST-  INDICATIONS: Short of breath, atrial fibrillation, right lower extremity DVT.  Radiation dose reduction techniques were utilized, including automated exposure control and exposure modulation based on body size.  TECHNIQUE: CT angiography of the chest, with three-dimensional reconstructions  COMPARISON: Correlated with chest x-ray from 10/05/2021  FINDINGS:  No pulmonary embolism. The aortic lumen is not evaluated owing to phase of contrast enhancement.  The heart size is enlarged without pericardial effusion. A few small subcentimeter short axis mediastinal lymph nodes are seen that are not significant by size criteria.  The airways appear clear.  Mild right more than left pleural effusions are seen.  The lungs show atelectasis versus infiltrate in the right more than left lower lungs. Old granulomatous disease is seen. A 2 mm nodule in the right upper lobe on axial image 9 is nonspecific, interval follow-up could be obtained if indicated.  Upper abdominal structures show no acute findings. Mild to moderate colonic fecal retention is apparent.  Degenerative changes are seen in the spine, shoulders. No acute fracture is identified.       No pulmonary embolism. Cardiomegaly. Mild right more than left pleural effusions and lower lung atelectasis versus infiltrate. Tiny right upper lobe indeterminate pulmonary nodule.  This report was finalized on 10/5/2021 8:54 PM by Dr. Gurvinder Carter M.D.        Ordered the above noted radiological studies. Reviewed by me in PACS.            PROCEDURES  Procedures            MEDICATIONS GIVEN IN ER  Medications   sodium chloride 0.9 % flush 10 mL (has no administration in time range)   nitroglycerin (NITROSTAT) SL tablet 0.4 mg (has no administration in time range)   acetaminophen (TYLENOL) tablet 650 mg (has no administration in time range)   ondansetron (ZOFRAN) tablet 4 mg (has  no administration in time range)     Or   ondansetron (ZOFRAN) injection 4 mg (has no administration in time range)   melatonin tablet 3 mg (has no administration in time range)   Pharmacy to Dose enoxaparin (LOVENOX) (has no administration in time range)   enoxaparin (LOVENOX) syringe 60 mg (has no administration in time range)   metoprolol tartrate (LOPRESSOR) tablet 25 mg (25 mg Oral Given 10/5/21 1641)   furosemide (LASIX) injection 40 mg (40 mg Intravenous Given 10/5/21 1648)   iopamidol (ISOVUE-370) 76 % injection 100 mL (95 mL Intravenous Given 10/5/21 2034)   enoxaparin (LOVENOX) syringe 60 mg (60 mg Subcutaneous Given 10/5/21 2122)                   MEDICAL DECISION MAKING, PROGRESS, and CONSULTS    All labs have been independently reviewed by me.  All radiology studies have been reviewed by me and discussed with radiologist dictating the report.   EKG's independently viewed and interpreted by me.  Discussion below represents my analysis of pertinent findings related to patient's condition, differential diagnosis, treatment plan and final disposition.      Differential diagnosis includes but is not limited to:  Acute CHF  Symptomatic atrial fibrillation  Pulmonary embolus  DVT  Pneumonia      ED Course as of Oct 05 2159   Tue Oct 05, 2021   1604 Creatinine(!): 1.25 [JR]   1604 Glucose(!): 107 [JR]   1604 INR: 1.10 [JR]   1604 proBNP(!): 11,205.0 [JR]   1604 WBC: 6.89 [JR]   1604 Hemoglobin: 12.2 [JR]   1605 Chest x-ray independently reviewed in PACS.  There is a small right pleural effusion, possible atelectasis or infiltrate at the right lung base also.    [JR]   1605 EKG          EKG time: 1519  Rhythm/Rate: A. fib, 114  P waves and AR: N/A  QRS, axis: LAFB, single PVC  ST and T waves: Nonspecific T wave changes laterally    Interpreted Contemporaneously by me, independently viewed  Similar compared to prior 5/29/2018 however A. fib is new today, rate is faster today          [JR]   7400 Vascular   reports that patient does have a DVT in the right mid thigh.  There is incidental notation of duplicate vessels, 1 with clot, 1 without.    [JR]   1857 Patient's nephew, Dr. Corey Cruz, was updated by telephone on patient's diagnosis of DVT, plan for CTA chest and admission.    [JR]   2111 Discussed with Dr. G[facility], who agrees to admit to inpatient telemetry bed.    [JR]      ED Course User Index  [JR] Eleazar Hernandez MD              I wore an N95 mask, face shield, and gloves during this patient encounter.  Patient also wearing a surgical mask.  Hand hygeine performed before and after seeing the patient.    DIAGNOSIS  Final diagnoses:   Acute congestive heart failure, unspecified heart failure type (HCC)   Atrial fibrillation with rapid ventricular response (HCC)   DVT, lower extremity, proximal, acute, right (HCC)         DISPOSITION  ADMIT            Latest Documented Vital Signs:  As of 21:59 EDT  BP- 140/86 HR- 79 Temp- 98 °F (36.7 °C) (Temporal) O2 sat- 91%        --    Please note that portions of this were completed with a voice recognition program.          Eleazar Hernandez MD  10/05/21 7211

## 2021-10-05 NOTE — TELEPHONE ENCOUNTER
Called adin ( [facility] health nurse) due to patient having a hard time hearing.    Went over lab results and xray results.

## 2021-10-06 ENCOUNTER — APPOINTMENT (OUTPATIENT)
Dept: GENERAL RADIOLOGY | Facility: HOSPITAL | Age: 86
End: 2021-10-06

## 2021-10-06 PROBLEM — I48.91 NEW ONSET A-FIB (HCC): Status: ACTIVE | Noted: 2021-10-06

## 2021-10-06 PROBLEM — N18.31 STAGE 3A CHRONIC KIDNEY DISEASE (HCC): Chronic | Status: ACTIVE | Noted: 2021-10-06

## 2021-10-06 PROBLEM — I50.23 ACUTE ON CHRONIC SYSTOLIC CHF (CONGESTIVE HEART FAILURE) (HCC): Status: ACTIVE | Noted: 2021-10-06

## 2021-10-06 PROBLEM — J90 PLEURAL EFFUSION, BILATERAL: Status: ACTIVE | Noted: 2021-10-06

## 2021-10-06 PROBLEM — I47.20 V-TACH (HCC): Status: ACTIVE | Noted: 2021-10-06

## 2021-10-06 PROBLEM — I82.411 ACUTE DEEP VEIN THROMBOSIS (DVT) OF FEMORAL VEIN OF RIGHT LOWER EXTREMITY (HCC): Status: ACTIVE | Noted: 2021-10-06

## 2021-10-06 LAB
ANION GAP SERPL CALCULATED.3IONS-SCNC: 7.9 MMOL/L (ref 5–15)
BUN SERPL-MCNC: 25 MG/DL (ref 8–23)
BUN/CREAT SERPL: 22.7 (ref 7–25)
CALCIUM SPEC-SCNC: 8.9 MG/DL (ref 8.2–9.6)
CHLORIDE SERPL-SCNC: 103 MMOL/L (ref 98–107)
CO2 SERPL-SCNC: 27.1 MMOL/L (ref 22–29)
CREAT SERPL-MCNC: 1.1 MG/DL (ref 0.57–1)
DEPRECATED RDW RBC AUTO: 43.7 FL (ref 37–54)
ERYTHROCYTE [DISTWIDTH] IN BLOOD BY AUTOMATED COUNT: 13.4 % (ref 12.3–15.4)
GFR SERPL CREATININE-BSD FRML MDRD: 46 ML/MIN/1.73
GLUCOSE SERPL-MCNC: 85 MG/DL (ref 65–99)
HCT VFR BLD AUTO: 35.8 % (ref 34–46.6)
HGB BLD-MCNC: 11.6 G/DL (ref 12–15.9)
MAGNESIUM SERPL-MCNC: 2 MG/DL (ref 1.7–2.3)
MCH RBC QN AUTO: 28.6 PG (ref 26.6–33)
MCHC RBC AUTO-ENTMCNC: 32.4 G/DL (ref 31.5–35.7)
MCV RBC AUTO: 88.4 FL (ref 79–97)
PLATELET # BLD AUTO: 171 10*3/MM3 (ref 140–450)
PMV BLD AUTO: 11.2 FL (ref 6–12)
POTASSIUM SERPL-SCNC: 4 MMOL/L (ref 3.5–5.2)
RBC # BLD AUTO: 4.05 10*6/MM3 (ref 3.77–5.28)
SODIUM SERPL-SCNC: 138 MMOL/L (ref 136–145)
TROPONIN T SERPL-MCNC: 0.04 NG/ML (ref 0–0.03)
TSH SERPL DL<=0.05 MIU/L-ACNC: 3.32 UIU/ML (ref 0.27–4.2)
WBC # BLD AUTO: 6.18 10*3/MM3 (ref 3.4–10.8)

## 2021-10-06 PROCEDURE — 99222 1ST HOSP IP/OBS MODERATE 55: CPT | Performed by: INTERNAL MEDICINE

## 2021-10-06 PROCEDURE — 97162 PT EVAL MOD COMPLEX 30 MIN: CPT

## 2021-10-06 PROCEDURE — 97166 OT EVAL MOD COMPLEX 45 MIN: CPT

## 2021-10-06 PROCEDURE — 97110 THERAPEUTIC EXERCISES: CPT

## 2021-10-06 PROCEDURE — 83735 ASSAY OF MAGNESIUM: CPT | Performed by: NURSE PRACTITIONER

## 2021-10-06 PROCEDURE — 25010000002 FUROSEMIDE PER 20 MG

## 2021-10-06 PROCEDURE — 84484 ASSAY OF TROPONIN QUANT: CPT | Performed by: NURSE PRACTITIONER

## 2021-10-06 PROCEDURE — 97535 SELF CARE MNGMENT TRAINING: CPT

## 2021-10-06 PROCEDURE — 84443 ASSAY THYROID STIM HORMONE: CPT | Performed by: INTERNAL MEDICINE

## 2021-10-06 PROCEDURE — 97530 THERAPEUTIC ACTIVITIES: CPT

## 2021-10-06 PROCEDURE — 36415 COLL VENOUS BLD VENIPUNCTURE: CPT

## 2021-10-06 PROCEDURE — 74018 RADEX ABDOMEN 1 VIEW: CPT

## 2021-10-06 PROCEDURE — 25010000002 ENOXAPARIN PER 10 MG

## 2021-10-06 PROCEDURE — 85027 COMPLETE CBC AUTOMATED: CPT

## 2021-10-06 PROCEDURE — 80048 BASIC METABOLIC PNL TOTAL CA: CPT

## 2021-10-06 RX ORDER — OXYBUTYNIN CHLORIDE 5 MG/1
5 TABLET, EXTENDED RELEASE ORAL
Status: DISCONTINUED | OUTPATIENT
Start: 2021-10-06 | End: 2021-10-11

## 2021-10-06 RX ORDER — CASTOR OIL AND BALSAM, PERU 788; 87 MG/G; MG/G
1 OINTMENT TOPICAL EVERY 12 HOURS SCHEDULED
Status: DISCONTINUED | OUTPATIENT
Start: 2021-10-06 | End: 2021-10-14 | Stop reason: HOSPADM

## 2021-10-06 RX ORDER — AMOXICILLIN 250 MG
2 CAPSULE ORAL NIGHTLY
Status: DISCONTINUED | OUTPATIENT
Start: 2021-10-06 | End: 2021-10-14 | Stop reason: HOSPADM

## 2021-10-06 RX ORDER — CHOLECALCIFEROL (VITAMIN D3) 125 MCG
500 CAPSULE ORAL DAILY
Status: DISCONTINUED | OUTPATIENT
Start: 2021-10-06 | End: 2021-10-14 | Stop reason: HOSPADM

## 2021-10-06 RX ORDER — POLYETHYLENE GLYCOL 3350 17 G/17G
17 POWDER, FOR SOLUTION ORAL DAILY
Status: DISCONTINUED | OUTPATIENT
Start: 2021-10-06 | End: 2021-10-14 | Stop reason: HOSPADM

## 2021-10-06 RX ORDER — METOPROLOL SUCCINATE 25 MG/1
25 TABLET, EXTENDED RELEASE ORAL
Status: DISCONTINUED | OUTPATIENT
Start: 2021-10-07 | End: 2021-10-11

## 2021-10-06 RX ADMIN — CASTOR OIL AND BALSAM, PERU 1 APPLICATION: 788; 87 OINTMENT TOPICAL at 21:06

## 2021-10-06 RX ADMIN — OXYBUTYNIN CHLORIDE 5 MG: 5 TABLET, EXTENDED RELEASE ORAL at 17:57

## 2021-10-06 RX ADMIN — FUROSEMIDE 20 MG: 20 INJECTION, SOLUTION INTRAMUSCULAR; INTRAVENOUS at 08:19

## 2021-10-06 RX ADMIN — CARBAMIDE PEROXIDE 6.5% 10 DROP: 6.5 LIQUID AURICULAR (OTIC) at 00:04

## 2021-10-06 RX ADMIN — POLYETHYLENE GLYCOL 3350 17 G: 17 POWDER, FOR SOLUTION ORAL at 14:33

## 2021-10-06 RX ADMIN — ASPIRIN 81 MG: 81 TABLET, CHEWABLE ORAL at 08:18

## 2021-10-06 RX ADMIN — POTASSIUM CHLORIDE 10 MEQ: 750 TABLET, EXTENDED RELEASE ORAL at 21:07

## 2021-10-06 RX ADMIN — ENOXAPARIN SODIUM 60 MG: 60 INJECTION, SOLUTION INTRAVENOUS; SUBCUTANEOUS at 21:06

## 2021-10-06 RX ADMIN — METOPROLOL TARTRATE 25 MG: 25 TABLET, FILM COATED ORAL at 08:18

## 2021-10-06 RX ADMIN — OXYBUTYNIN CHLORIDE 5 MG: 5 TABLET, EXTENDED RELEASE ORAL at 08:18

## 2021-10-06 RX ADMIN — POTASSIUM CHLORIDE 10 MEQ: 750 TABLET, EXTENDED RELEASE ORAL at 00:04

## 2021-10-06 RX ADMIN — Medication 500 MCG: at 14:33

## 2021-10-06 RX ADMIN — DOCUSATE SODIUM 50MG AND SENNOSIDES 8.6MG 2 TABLET: 8.6; 5 TABLET, FILM COATED ORAL at 21:07

## 2021-10-06 RX ADMIN — POTASSIUM CHLORIDE 10 MEQ: 750 TABLET, EXTENDED RELEASE ORAL at 08:19

## 2021-10-06 RX ADMIN — CASTOR OIL AND BALSAM, PERU 1 APPLICATION: 788; 87 OINTMENT TOPICAL at 14:33

## 2021-10-06 RX ADMIN — CARBAMIDE PEROXIDE 6.5% 10 DROP: 6.5 LIQUID AURICULAR (OTIC) at 21:07

## 2021-10-06 RX ADMIN — METOPROLOL TARTRATE 25 MG: 25 TABLET, FILM COATED ORAL at 00:04

## 2021-10-06 RX ADMIN — CARBAMIDE PEROXIDE 6.5% 10 DROP: 6.5 LIQUID AURICULAR (OTIC) at 08:20

## 2021-10-06 NOTE — ED NOTES
Advised floor nurse Lamberto that pt would need a dental soft diet in leiu of a regular texture. She states the floor nurses are able to downgrade to dental soft.      Nathalie Malcolm, RN  10/05/21 1964

## 2021-10-06 NOTE — NURSING NOTE
"CWON note: pt seen for evaluation of skin issues POA. Pt is pleasantly confused at times and is very difficult to convince regarding best practice pressure injury prevention strategies. She stated \"I used hand lotion to get my skin healed on my bottom\"  And she is refusing to have an Optifoam placed on her sacral skin for protection.     Sacral skin has 8x 6cm area of deep red blanchable and nonblanchable erythema. There is a 1x 1cm area of scuffed skin on rt buttocks.  Pt is refusing Optifoam, so will order cream for protection per her request.   Harvey heels are boggy with blanchable and nonblanchable erythema. Venelex ointment ordered and heel boots to off-load heels at all times. Prevention standing orders placed in EPIC, accumax pump to be added to pt's bed, and waffle cushion to be used while sitting in chair. Discussed all with RN.  "

## 2021-10-06 NOTE — PROGRESS NOTES
"Pharmacy Consult - Lovenox    Shira Davila has been consulted for pharmacy to dose Lovenox for active DVT per [facility] [place] 's request.       Relevant clinical data and objective history reviewed:  89 y.o. female 162.6 cm (64\") 58.5 kg (129 lb)  Body mass index is 22.14 kg/m².    Home Anticoagulation: none listed    Past Medical History:   Diagnosis Date   • Anxiety    • Arthritis    • CHF (congestive heart failure) (HCC)    • Coronary artery disease    • Disease of thyroid gland 3/16/2016   • Diverticulitis    • Hypertension      is allergic to cephalexin, atorvastatin calcium, sesame oil, amoxicillin, cortisone, diazepam, doxycycline, erythromycin, horse-derived products, levoxyl [levothyroxine sodium], lexapro [escitalopram oxalate], lipitor [atorvastatin], lopressor [metoprolol tartrate], metaxalone, omeprazole, penicillins, sesame seed (diagnostic), solarcaine [benzocaine], and sulfa antibiotics.    Lab Results   Component Value Date    WBC 6.89 10/05/2021    HGB 12.2 10/05/2021    HCT 37.0 10/05/2021    MCV 87.7 10/05/2021     10/05/2021     Lab Results   Component Value Date    GLUCOSE 107 (H) 10/05/2021    CALCIUM 9.5 10/05/2021     10/05/2021    K 4.2 10/05/2021    CO2 26.1 10/05/2021     10/05/2021    BUN 24 (H) 10/05/2021    CREATININE 1.25 (H) 10/05/2021    EGFRIFNONA 39 (L) 10/05/2021    BCR 19.2 10/05/2021    ANIONGAP 9.9 10/05/2021       Estimated Creatinine Clearance: 22.7 mL/min (A) (by C-G formula based on SCr of 1.25 mg/dL (H)).    Active Inpatient Anticoagulation Orders: Patient due to receive one-time dose of Lovenox 60 mg (1 mg/kg) in the ED    Assessment/Plan  · Duplex venous lower extremity scan positive for Acute right lower extremity deep vein thrombosis noted in the mid femoral and distal femoral.      1. Will start patient on Lovenox 60 mg (1 mg/kg) subcutaneous every 24 hours adjusted for CrCL < 30 mL/min  2. Pharmacy will continue to follow daily and adjust " dosing as necessary    Liza Reynolds, PharmD  Clinical Pharmacist, Emergency Medicine   Phone: [phone]

## 2021-10-06 NOTE — ED NOTES
"Nursing report ED to floor  Shira Davila  89 y.o.  female    HPI (triage note):   Chief Complaint   Patient presents with   • Shortness of Breath   • Leg Swelling       Admitting doctor:   Gera Rodrigues MD    Admitting diagnosis:   The primary encounter diagnosis was Acute congestive heart failure, unspecified heart failure type (HCC). Diagnoses of Atrial fibrillation with rapid ventricular response (HCC) and DVT, lower extremity, proximal, acute, right (HCC) were also pertinent to this visit.    Code status:   Current Code Status     Date Active Code Status Order ID Comments User Context       10/5/2021 2115 CPR [id number]  Brandon Fuentes, [facility]     Advance Care Planning Activity      Questions for Current Code Status     Question Answer Comment    Code Status CPR     Medical Interventions (Level of Support Prior to Arrest) Full     Level Of Support Discussed With Patient           Allergies:   Cephalexin, Atorvastatin calcium, Sesame oil, Amoxicillin, Cortisone, Diazepam, Doxycycline, Erythromycin, Horse-derived products, Levoxyl [levothyroxine sodium], Lexapro [escitalopram oxalate], Lipitor [atorvastatin], Lopressor [metoprolol tartrate], Metaxalone, Omeprazole, Penicillins, Sesame seed (diagnostic), Solarcaine [benzocaine], and Sulfa antibiotics    Weight:       10/05/21  2118   Weight: 58.5 kg (129 lb)       Most recent vitals:   Vitals:    10/05/21 1901 10/05/21 2056 10/05/21 2101 10/05/21 2118   BP: 113/81  140/86    Pulse: 79  79    Resp:   16    Temp:       TempSrc:       SpO2: 95%  91%    Weight:    58.5 kg (129 lb)   Height:  162.6 cm (64\")         Active LDAs/IV Access:   Lines, Drains & Airways    Active LDAs     Name:   Placement date:   Placement time:   Site:   Days:    Peripheral IV 10/05/21 1519 Left Antecubital   10/05/21    1519    Antecubital   less than 1                Labs (abnormal labs have a star):   Labs Reviewed   COMPREHENSIVE METABOLIC PANEL - Abnormal; Notable for the " following components:       Result Value    Glucose 107 (*)     BUN 24 (*)     Creatinine 1.25 (*)     ALT (SGPT) 43 (*)     eGFR Non  Amer 39 (*)     All other components within normal limits    Narrative:     GFR Normal >60  Chronic Kidney Disease <60  Kidney Failure <15     TROPONIN (IN-HOUSE) - Abnormal; Notable for the following components:    Troponin T 0.045 (*)     All other components within normal limits    Narrative:     Troponin T Reference Range:  <= 0.03 ng/mL-   Negative for AMI  >0.03 ng/mL-     Abnormal for myocardial necrosis.  Clinicians would have to utilize clinical acumen, EKG, Troponin and serial changes to determine if it is an Acute Myocardial Infarction or myocardial injury due to an underlying chronic condition.       Results may be falsely decreased if patient taking Biotin.     BNP (IN-HOUSE) - Abnormal; Notable for the following components:    proBNP 11,205.0 (*)     All other components within normal limits    Narrative:     Among patients with dyspnea, NT-proBNP is highly sensitive for the detection of acute congestive heart failure. In addition NT-proBNP of <300 pg/ml effectively rules out acute congestive heart failure with 99% negative predictive value.    Results may be falsely decreased if patient taking Biotin.     CBC WITH AUTO DIFFERENTIAL - Abnormal; Notable for the following components:    Lymphocyte % 17.1 (*)     All other components within normal limits   COVID-19,[facility] THI IN-HOUSE CEPHEID/MOHSEN, NP SWAB IN TRANSPORT MEDIA 8-12 HR TAT - Normal    Narrative:     Fact sheet for providers: https://www.fda.gov/media/168730/download    Fact sheet for patients: https://www.fda.gov/media/070521/download    Test performed by PCR.   PROTIME-INR - Normal   MAGNESIUM - Normal   COVID PRE-OP / PRE-PROCEDURE SCREENING ORDER (NO ISOLATION)    Narrative:     The following orders were created for panel order COVID PRE-OP / PRE-PROCEDURE SCREENING ORDER (NO ISOLATION) - Swab,  Nasopharynx.  Procedure                               Abnormality         Status                     ---------                               -----------         ------                     COVID-19, THI IN-HOUSE...[[id number]]  Normal              Final result                 Please view results for these tests on the individual orders.   CBC AND DIFFERENTIAL    Narrative:     The following orders were created for panel order CBC & Differential.  Procedure                               Abnormality         Status                     ---------                               -----------         ------                     CBC Auto Differential[[id number]]        Abnormal            Final result                 Please view results for these tests on the individual orders.       EKG:   ECG 12 Lead   Final Result   HEART RATE= 114  bpm   RR Interval= 524  ms   MD Interval=   ms   P Horizontal Axis=   deg   P Front Axis=   deg   QRSD Interval= 95  ms   QT Interval= 356  ms   QRS Axis= -54  deg   T Wave Axis= 92  deg   - ABNORMAL ECG -   Atrial fibrillation   Ventricular premature complex   Left anterior fascicular block   Anterior infarct, old   Nonspecific T abnormalities, lateral leads   When compared with ECG of 29-May-2018 19:18:22,   Significant change in rhythm: previously sinus   New or worsened ischemia or infarction   New conduction abnormality   Electronically Signed By: Yao Blackmon ([facility]) 05-Oct-2021 19:38:04   Date and Time of Study: 2021-10-05 15:19:34          Meds given in ED:   Medications   sodium chloride 0.9 % flush 10 mL (has no administration in time range)   nitroglycerin (NITROSTAT) SL tablet 0.4 mg (has no administration in time range)   acetaminophen (TYLENOL) tablet 650 mg (has no administration in time range)   ondansetron (ZOFRAN) tablet 4 mg (has no administration in time range)     Or   ondansetron (ZOFRAN) injection 4 mg (has no administration in time range)   melatonin tablet 3 mg (has no  administration in time range)   Pharmacy to Dose enoxaparin (LOVENOX) (has no administration in time range)   enoxaparin (LOVENOX) syringe 60 mg (has no administration in time range)   metoprolol tartrate (LOPRESSOR) tablet 25 mg (25 mg Oral Given 10/5/21 1641)   furosemide (LASIX) injection 40 mg (40 mg Intravenous Given 10/5/21 1648)   iopamidol (ISOVUE-370) 76 % injection 100 mL (95 mL Intravenous Given 10/5/21 2034)   enoxaparin (LOVENOX) syringe 60 mg (60 mg Subcutaneous Given 10/5/21 2122)       Imaging results:  XR Chest 1 View    Result Date: 10/5/2021  Minimal likely atelectasis or infiltrate at the right base, minimal bilateral pleural effusions, follow-up recommended. Cardiomegaly. Tortuous aorta.  This report was finalized on 10/5/2021 3:22 PM by Dr. Gurvinder Carter M.D.      CT Angiogram Chest    Result Date: 10/5/2021   No pulmonary embolism. Cardiomegaly. Mild right more than left pleural effusions and lower lung atelectasis versus infiltrate. Tiny right upper lobe indeterminate pulmonary nodule.  This report was finalized on 10/5/2021 8:54 PM by Dr. Gurvinder Carter M.D.        Ambulatory status:   Ad jett    Social issues:   Social History     Socioeconomic History   • Marital status: Single     Spouse name: Not on file   • Number of children: Not on file   • Years of education: Not on file   • Highest education level: Not on file   Tobacco Use   • Smoking status: Never Smoker   • Smokeless tobacco: Never Used   Substance and Sexual Activity   • Alcohol use: No   • Drug use: No   • Sexual activity: Defer    Nursing report ED to floor       Nathalie Malcolm RN  10/05/21 6653

## 2021-10-06 NOTE — H&P
Patient Name:  Shira Davila  YOB: 1921  MRN:  [id number]  Admit Date:  10/5/2021  Patient Care Team:  Aletha Rincon MD as PCP - General (Family Medicine)      Subjective   History Present Illness     Chief Complaint   Patient presents with   • Shortness of Breath   • Leg Swelling       Ms. Davila is a 89 y.o. female with a history of CHF, CAD, HTN that presents to [facility] complaining of RLE swelling and pain/tenderness x1 week recently accompanied by shortness of air.  Patient noted to be in atrial fibrillation in the emergency department but denies any history of arrhythmias.  Patient also noted to be in CHF exacerbation requiring IV Lasix to diurese excess fluid. Patient denies having any chest pain at this time states that she has not experienced any chest pain, chest tightness or dizziness.  Denies cough, fever or chills. Patient is ambulatory but has decreased mobility due to her elderly state.  Patient is very hard of hearing and attributes that to cerumen impaction bilateral ears.  States that she has had procedure several years back where a physician has removed large amounts of earwax from her ears.  Does not have hearing aids at this time.        has a past medical history of Anxiety, Arthritis, CHF (congestive heart failure) ([facility]), Coronary artery disease, Disease of thyroid gland (3/16/2016), Diverticulitis, and Hypertension.    History of Present Illness  Review of Systems   Constitutional: Positive for activity change. Negative for chills, fatigue and fever.   HENT: Negative for congestion.         Bilateral cerumen impaction     Eyes: Negative for discharge and redness.   Respiratory: Positive for shortness of breath. Negative for cough and chest tightness.    Cardiovascular: Positive for leg swelling. Negative for chest pain and palpitations.   Gastrointestinal: Negative.    Endocrine: Negative.    Genitourinary: Negative.    Musculoskeletal: Positive for  back pain, gait problem and joint swelling.   Skin: Negative.    Neurological: Positive for weakness. Negative for dizziness, numbness and headaches.   Psychiatric/Behavioral: Negative for agitation, confusion and decreased concentration.        Personal History     Past Medical History:   Diagnosis Date   • Anxiety    • Arthritis    • CHF (congestive heart failure) (HCC)    • Coronary artery disease    • Disease of thyroid gland 3/16/2016   • Diverticulitis    • Hypertension      Past Surgical History:   Procedure Laterality Date   • ABDOMINAL SURGERY      liban   • APPENDECTOMY     • COLON SURGERY      fistulectomy   • EYE SURGERY      cataract   • HYSTERECTOMY     • TONSILLECTOMY       Family History   Problem Relation Age of Onset   • Hypertension Other      Social History     Tobacco Use   • Smoking status: Never Smoker   • Smokeless tobacco: Never Used   Substance Use Topics   • Alcohol use: No   • Drug use: No     No current facility-administered medications on file prior to encounter.     Current Outpatient Medications on File Prior to Encounter   Medication Sig Dispense Refill   • aspirin 81 MG tablet Take 81 mg by mouth Daily.     • clonazePAM (KlonoPIN) 0.5 MG tablet Take 1 tablet by mouth Daily As Needed for Anxiety. 30 tablet 3   • furosemide (LASIX) 20 MG tablet Take 1 tablet by mouth Daily. 90 tablet 6   • nystatin-zinc oxide Apply to bid to skin 100 g 11   • potassium chloride (K-DUR,KLOR-CON) 10 MEQ CR tablet Take 1 tablet by mouth 2 (Two) Times a Day. 60 tablet 3   • pyridoxine (B6 NATURAL) 100 MG tablet Take 100 mg by mouth Daily.     • solifenacin (VESICARE) 5 MG tablet TAKE 1 TABLET BY MOUTH EVERY DAY 90 tablet 1   • vitamin B-12 (CYANOCOBALAMIN) 500 MCG tablet Take 500 mcg by mouth Daily.       Allergies   Allergen Reactions   • Cephalexin Diarrhea   • Atorvastatin Calcium Unknown - Low Severity   • Sesame Oil Unknown - Low Severity   • Amoxicillin Unknown - Low Severity   • Cortisone Unknown -  Low Severity   • Diazepam Unknown - Low Severity   • Doxycycline Rash   • Erythromycin Unknown - Low Severity   • Horse-Derived Products Unknown - Low Severity   • Levoxyl [Levothyroxine Sodium] Unknown - Low Severity   • Lexapro [Escitalopram Oxalate] Unknown - Low Severity   • Lipitor [Atorvastatin] Unknown - Low Severity   • Lopressor [Metoprolol Tartrate] Unknown - Low Severity   • Metaxalone Unknown - Low Severity   • Omeprazole Unknown - Low Severity   • Penicillins Unknown - Low Severity     AND ALL RELATED    • Sesame Seed (Diagnostic) Unknown - Low Severity   • Solarcaine [Benzocaine] Unknown - Low Severity   • Sulfa Antibiotics Unknown - Low Severity       Objective    Objective     Vital Signs  Temp:  [96.8 °F (36 °C)-98 °F (36.7 °C)] 98 °F (36.7 °C)  Heart Rate:  [] 76  Resp:  [16-18] 16  BP: (110-140)/(81-86) 139/82  SpO2:  [87 %-97 %] 91 %  on   ;   Device (Oxygen Therapy): room air  Body mass index is 20.32 kg/m².    Physical Exam  HENT:      Head: Normocephalic.      Right Ear: External ear normal. There is impacted cerumen.      Left Ear: External ear normal. There is impacted cerumen.      Mouth/Throat:      Pharynx: Oropharynx is clear.   Eyes:      Extraocular Movements: Extraocular movements intact.      Conjunctiva/sclera: Conjunctivae normal.   Cardiovascular:      Rate and Rhythm: Normal rate. Rhythm irregularly irregular.   Pulmonary:      Effort: Pulmonary effort is normal.      Breath sounds: Normal breath sounds.   Abdominal:      General: Bowel sounds are normal.   Musculoskeletal:         General: Swelling and tenderness present.      Cervical back: Normal range of motion and neck supple. No rigidity or tenderness.      Right lower leg: 3+ Edema present.   Skin:     General: Skin is warm and dry.      Capillary Refill: Capillary refill takes less than 2 seconds.      Findings: Bruising present.   Neurological:      Mental Status: She is alert and oriented to person, place, and  time. Mental status is at baseline.   Psychiatric:         Mood and Affect: Mood normal.         Behavior: Behavior normal.         Thought Content: Thought content normal.         Judgment: Judgment normal.         Results Review:  I reviewed the patient's new clinical results.  I reviewed the patient's new imaging results and agree with the interpretation.  I reviewed the patient's other test results and agree with the interpretation  I personally viewed and interpreted the patient's EKG/Telemetry data  Discussed with ED provider.    Lab Results (last 24 hours)     Procedure Component Value Units Date/Time    CBC & Differential [[id number]]  (Abnormal) Collected: 10/05/21 1520    Specimen: Blood Updated: 10/05/21 1540    Narrative:      The following orders were created for panel order CBC & Differential.  Procedure                               Abnormality         Status                     ---------                               -----------         ------                     CBC Auto Differential[[id number]]        Abnormal            Final result                 Please view results for these tests on the individual orders.    Comprehensive Metabolic Panel [[id number]]  (Abnormal) Collected: 10/05/21 1520    Specimen: Blood Updated: 10/05/21 1558     Glucose 107 mg/dL      BUN 24 mg/dL      Creatinine 1.25 mg/dL      Sodium 139 mmol/L      Potassium 4.2 mmol/L      Chloride 103 mmol/L      CO2 26.1 mmol/L      Calcium 9.5 mg/dL      Total Protein 6.3 g/dL      Albumin 4.00 g/dL      ALT (SGPT) 43 U/L      AST (SGOT) 32 U/L      Alkaline Phosphatase 75 U/L      Total Bilirubin 0.4 mg/dL      eGFR Non African Amer 39 mL/min/1.73      Globulin 2.3 gm/dL      A/G Ratio 1.7 g/dL      BUN/Creatinine Ratio 19.2     Anion Gap 9.9 mmol/L     Narrative:      GFR Normal >60  Chronic Kidney Disease <60  Kidney Failure <15      Protime-INR [[id number]]  (Normal) Collected: 10/05/21 1520    Specimen: Blood Updated: 10/05/21 1550      Protime 14.0 Seconds      INR 1.10    Troponin [[id number]]  (Abnormal) Collected: 10/05/21 1520    Specimen: Blood Updated: 10/05/21 1611     Troponin T 0.045 ng/mL     Narrative:      Troponin T Reference Range:  <= 0.03 ng/mL-   Negative for AMI  >0.03 ng/mL-     Abnormal for myocardial necrosis.  Clinicians would have to utilize clinical acumen, EKG, Troponin and serial changes to determine if it is an Acute Myocardial Infarction or myocardial injury due to an underlying chronic condition.       Results may be falsely decreased if patient taking Biotin.      BNP [[id number]]  (Abnormal) Collected: 10/05/21 1520    Specimen: Blood Updated: 10/05/21 1556     proBNP 11,205.0 pg/mL     Narrative:      Among patients with dyspnea, NT-proBNP is highly sensitive for the detection of acute congestive heart failure. In addition NT-proBNP of <300 pg/ml effectively rules out acute congestive heart failure with 99% negative predictive value.    Results may be falsely decreased if patient taking Biotin.      CBC Auto Differential [[id number]]  (Abnormal) Collected: 10/05/21 1520    Specimen: Blood Updated: 10/05/21 1540     WBC 6.89 10*3/mm3      RBC 4.22 10*6/mm3      Hemoglobin 12.2 g/dL      Hematocrit 37.0 %      MCV 87.7 fL      MCH 28.9 pg      MCHC 33.0 g/dL      RDW 13.3 %      RDW-SD 42.6 fl      MPV 11.0 fL      Platelets 184 10*3/mm3      Neutrophil % 72.3 %      Lymphocyte % 17.1 %      Monocyte % 9.3 %      Eosinophil % 0.6 %      Basophil % 0.4 %      Immature Grans % 0.3 %      Neutrophils, Absolute 4.98 10*3/mm3      Lymphocytes, Absolute 1.18 10*3/mm3      Monocytes, Absolute 0.64 10*3/mm3      Eosinophils, Absolute 0.04 10*3/mm3      Basophils, Absolute 0.03 10*3/mm3      Immature Grans, Absolute 0.02 10*3/mm3      nRBC 0.0 /100 WBC     Magnesium [[id number]]  (Normal) Collected: 10/05/21 1520    Specimen: Blood Updated: 10/05/21 2116     Magnesium 2.1 mg/dL     COVID PRE-OP / PRE-PROCEDURE SCREENING ORDER  (NO ISOLATION) - Swab, Nasopharynx [[id number]]  (Normal) Collected: 10/05/21 1757    Specimen: Swab from Nasopharynx Updated: 10/05/21 1824    Narrative:      The following orders were created for panel order COVID PRE-OP / PRE-PROCEDURE SCREENING ORDER (NO ISOLATION) - Swab, Nasopharynx.  Procedure                               Abnormality         Status                     ---------                               -----------         ------                     COVID-19,[facility] IN-HOUSE...[[id number]]  Normal              Final result                 Please view results for these tests on the individual orders.    COVID-19,[facility] IN-HOUSE CEPHEID/MOHSEN NP SWAB IN TRANSPORT MEDIA 8-12 HR TAT - Swab, Nasopharynx [[id number]]  (Normal) Collected: 10/05/21 1757    Specimen: Swab from Nasopharynx Updated: 10/05/21 1824     COVID19 Not Detected    Narrative:      Fact sheet for providers: https://www.fda.gov/media/978388/download    Fact sheet for patients: https://www.fda.gov/media/949274/download    Test performed by PCR.          Imaging Results (Last 24 Hours)     Procedure Component Value Units Date/Time    CT Angiogram Chest [[id number]] Collected: 10/05/21 2043     Updated: 10/05/21 2057    Narrative:      CT ANGIOGRAM CHEST-     INDICATIONS: Short of breath, atrial fibrillation, right lower extremity  DVT.  Radiation dose reduction techniques were utilized, including  automated exposure control and exposure modulation based on body size.     TECHNIQUE: CT angiography of the chest, with three-dimensional  reconstructions     COMPARISON: Correlated with chest x-ray from 10/05/2021     FINDINGS:     No pulmonary embolism. The aortic lumen is not evaluated owing to phase  of contrast enhancement.     The heart size is enlarged without pericardial effusion. A few small  subcentimeter short axis mediastinal lymph nodes are seen that are not  significant by size criteria.     The airways appear clear.     Mild right more than  left pleural effusions are seen.     The lungs show atelectasis versus infiltrate in the right more than left  lower lungs. Old granulomatous disease is seen. A 2 mm nodule in the  right upper lobe on axial image 9 is nonspecific, interval follow-up  could be obtained if indicated.     Upper abdominal structures show no acute findings. Mild to moderate  colonic fecal retention is apparent.     Degenerative changes are seen in the spine, shoulders. No acute fracture  is identified.       Impression:         No pulmonary embolism. Cardiomegaly. Mild right more than left pleural  effusions and lower lung atelectasis versus infiltrate. Tiny right upper  lobe indeterminate pulmonary nodule.     This report was finalized on 10/5/2021 8:54 PM by Dr. Gurvinder Carter M.D.       XR Chest 1 View [[id number]] Collected: 10/05/21 1521     Updated: 10/05/21 1525    Narrative:      XR CHEST 1 VW-     HISTORY: Female who is 89 years-old,  short of breath     TECHNIQUE: Frontal view of the chest     COMPARISON: 04/21/2018     FINDINGS: The heart is enlarged. Aorta is tortuous. Pulmonary  vasculature is unremarkable. Minimal likely atelectasis or infiltrate at  the right base, minimal bilateral pleural effusions. No pneumothorax. No  acute osseous process.       Impression:      Minimal likely atelectasis or infiltrate at the right base,  minimal bilateral pleural effusions, follow-up recommended.  Cardiomegaly. Tortuous aorta.      This report was finalized on 10/5/2021 3:22 PM by Dr. Gurvinder Carter M.D.               ECG 12 Lead   Final Result   HEART RATE= 114  bpm   RR Interval= 524  ms   OK Interval=   ms   P Horizontal Axis=   deg   P Front Axis=   deg   QRSD Interval= 95  ms   QT Interval= 356  ms   QRS Axis= -54  deg   T Wave Axis= 92  deg   - ABNORMAL ECG -   Atrial fibrillation   Ventricular premature complex   Left anterior fascicular block   Anterior infarct, old   Nonspecific T abnormalities, lateral leads   When  compared with ECG of 29-May-2018 19:18:22,   Significant change in rhythm: previously sinus   New or worsened ischemia or infarction   New conduction abnormality   Electronically Signed By: Yao Blackmon ([facility]) 05-Oct-2021 19:38:04   Date and Time of Study: 2021-10-05 15:19:34        Assessment/Plan   Assessment/Plan   Active Hospital Problems    Diagnosis  POA   • **Deep vein thrombosis (DVT) of femoral vein (HCC) [I82.419]  Unknown   • Acute congestive heart failure (HCC) [I50.9]  Yes   • Atrial fibrillation (HCC) [I48.91]  Yes   • Bilateral impacted cerumen [H61.23]  Yes   • Coronary artery disease [I25.10]  Yes   • Chronic kidney disease, stage III (moderate) (CMS/HCC) [N18.30]  Yes   • Chronic systolic CHF (congestive heart failure) (HCC) [I50.22]  Yes   • BP (high blood pressure) [I10]  Yes      Resolved Hospital Problems   No resolved problems to display.       89 y.o. female admitted with Deep vein thrombosis (DVT) of femoral vein ([facility]).    CHF exacerbation and new onset atrial fibrillation  -Cardiology consult  -Telemetry bed for continuous cardiac monitoring  -Echocardiogram in a.m.  -Anticoagulation therapy  -Continue metoprolol scheduled  -IV Lasix  -Supplemental oxygen as needed  -Daily weights    DVT  -Lovenox pharmacy to dose  -Neurovascular checks every 4 hours  -Monitor circumference of affected extremity    Hypertension and CKD stage III  -Vital signs per hospital protocol for telemetry bed  -I's and O's  -Monitor labs especially electrolytes    · Debrox for bilateral cerumen impaction  · Full code.  · Discussed with patient, nursing staff and ED provider.      FRANCISCO Porter  [facility]  10/05/21  22:43 EDT

## 2021-10-06 NOTE — PLAN OF CARE
Goal Outcome Evaluation:           Progress: no change  Outcome Summary: Pt came into ER c/o SOA and edema in legs. CT (-). Bilateral doppler (+) for DVT in R femoral. Pt is new onset A. Fib on cardiac monitoring - cardio consulted. Pressure injury on coccyx - wound cleaned, mepilex applied and wound consulted. Pt turned q 2 hours. Purewick in place. Pills taken whole with applesauce. Diet changed to soft texture. No other changes. WCM

## 2021-10-06 NOTE — PLAN OF CARE
"Goal Outcome Evaluation:  Plan of Care Reviewed With: patient           Outcome Summary: Pt is a 89 yo female admitted with increased SOA and B LE edema. Workup revealed acute R femoral DVT and was started on Lovenox. OK to work with therapy at this time. Pt reports living alone and is fully IND with ADL and mobility. Pt with difficulty staying on task and providing subjective history information. Pt very fixated on certain things and requires increased time to attempt redirection. Pt used straight cane for transfers and ambulation in room with cga. No overt LOB but pt appears unsteady. Pt initially not wanting to return to bed stating she feels \"trapped\" in the bed. Brought recliner in room to set up however by that point pt was adament about returning to bed. Pt left in bed with all known needs met and RN aware. Recommending d/c to SNF as pt does not seem safe to return home alone.  "

## 2021-10-06 NOTE — CASE MANAGEMENT/SOCIAL WORK
Discharge Planning Assessment  [facility]     Patient Name: Shira Davila  MRN: [id number]  Today's Date: 10/6/2021    Admit Date: 10/5/2021    Discharge Needs Assessment     Row Name 10/06/21 1641       Living Environment    Lives With  alone    Current Living Arrangements  home/apartment/condo    Primary Care Provided by  self    Provides Primary Care For  no one, unable/limited ability to care for self    Family Caregiver if Needed  none    Quality of Family Relationships  unable to assess    Able to Return to Prior Arrangements  yes       Resource/Environmental Concerns    Resource/Environmental Concerns  home accessibility    Home Accessibility Concerns  stairs to enter home       Transition Planning    Patient/Family Anticipates Transition to  inpatient rehabilitation facility;home    Patient/Family Anticipated Services at Transition  skilled nursing;home health care    Transportation Anticipated  other (see comments)       Discharge Needs Assessment    Readmission Within the Last 30 Days  no previous admission in last 30 days    Current Outpatient/Agency/Support Group  homecare agency Uses services through [facility]    Equipment Currently Used at Home  cane, straight;walker, rolling    Concerns to be Addressed  care coordination/care conferences;discharge planning;decision-making    Anticipated Changes Related to Illness  none    Equipment Needed After Discharge  none    Outpatient/Agency/Support Group Needs  homecare agency    Discharge Facility/Level of Care Needs  home with home health;nursing facility, skilled    Provided Post Acute Provider List?  Refused    Refused Provider List Comment  Requests Ese Altamirano)    Provided Post Acute Provider Quality & Resource List?  Refused    Current Discharge Risk  chronically ill;lives alone;lack of support system/caregiver        Discharge Plan     Row Name 10/06/21 0322       Plan    Plan  SNF (referrals pending) vs home with  and continue services  with [facility].    Patient/Family in Agreement with Plan  yes    Plan Comments  Met with pt. at bedside. Pt is [place]. Explained roll of . Face sheet and pharmacy verified. Pt lives alone in a condominium with two steps to enter. DME equipment includes a cane and walker.  Pt states she does her own ADLs but does have [facility] come in every 3-4 days for three hrs. to clean and cook for her. States her neighbor brings her breakfast every morning. Pt states she never  or had children. States her nieces and nephews are nonexistent. 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 States she retired from [employer] in  on the day Rick . Pt has been to “[facility] now [place] for Rehab and has used [facility]. Pt states she would like to go back to [place] because their food is so good. Referral placed for [place] but no beds available. Referrals placed to additional SNF. PT recommends SNF at D/C. Pt’s PCP is Dr Rincon. Uses [facility] on [address]. Plan is to D/C on [address]. Enrolled in meds to bed. Pt does not drive. At discharge, pt. will need CCP to setup transport. Explained that CCP would follow to assess for discharge needs.  Conner Michel RN-BC        Continued Care and Services - Admitted Since 10/5/2021     Destination     Service Provider Request Status Selected Services Address Phone Fax Patient Preferred    [facility]  Pending - Request Sent N/A [address] [place] [place] [facility] [address] [phone] --    [facility]  Pending - Request Sent N/A [address] [address] [phone] --    [facility]  Pending - Request Sent N/A [address] [address] [phone] --    [facility]  Pending - Request Sent N/A [address] [address] [phone] --    [facility]  Pending - Request Sent N/A [address]  [facility] [address] [phone] --    [facility]  Pending - Request Sent N/A [address] [facility] [address] [phone] --    [facility]  Pending - Request Sent N/A [address] [facility] [address] [phone] --    [facility]  Pending - Request Sent N/A [address] [address] [phone] --          Home Medical Care     Service Provider Request Status Selected Services Address Phone Fax Patient Preferred     [facility]  Pending - Request Sent N/A [address] [address] [phone] --                Demographic Summary     Row Name 10/06/21 1639       General Information    Admission Type  inpatient    Arrived From  emergency department    Required Notices Provided  Important Message from Medicare    Reason for Consult  discharge planning;decision-making;care coordination/care conference    Preferred Language  English     Used During This Interaction  no        Functional Status     Row Name 10/06/21 1639       Functional Status    Usual Activity Tolerance  fair    Current Activity Tolerance  fair       Functional Status, IADL    Meal Preparation  assistive person Neighbor brings her breakfast every morning    Housekeeping  assistive person    Laundry  assistive person    Shopping  assistive person    IADL Comments  Pt using services with [facility] PTA for cooking and cleaning       Mental Status    General Appearance WDL  WDL       Mental Status Summary    Recent Changes in Mental Status/Cognitive Functioning  no changes       Employment/    Employment Status  retired    Current or Previous Occupation  healthcare    Employment/ Comments  Retiired nurse from [employer]                Conner Michel RN

## 2021-10-06 NOTE — PLAN OF CARE
Goal Outcome Evaluation:  Plan of Care Reviewed With: patient           Outcome Summary: Pt is a 89 y.o female admitted to [facility] with BLE swelling and SOB, has hx of CHF. + for RLE DVT currently treated with Lovenox, s/p theraputic time for mobilization with therapies. She lives alone per her report and independent with her ADLs and mobility. Pt very difficult to redirect today and difficult. She does perform functional mobility to bathroom with CGA with SPC but has poor safety awareness and has difficulty following safety commands throughout. She has increased difficulty with LBD for RLE and requires Max A. Pt is a high falls risk. Recommend continued OT to address safety with ADL and transfers. Recommend SNF at [place].      Appropriate PPE worn during encounter including gloves, mask, and eye protection. Hand hygiene completed. Pt wore a mask.

## 2021-10-06 NOTE — CASE MANAGEMENT/SOCIAL WORK
Continued Stay Note  [facility]     Patient Name: Shira Davila  MRN: [id number]  Today's Date: 10/6/2021    Admit Date: 10/5/2021    Discharge Plan     Row Name 10/06/21 6360       Plan    Plan  SNF (referrals pending) vs home with HH and continue services with [facility].    Patient/Family in Agreement with Plan  yes    Plan Comments  Met with pt. at bedside. Pt is [place]. Explained roll of . Face sheet and pharmacy verified. Pt lives alone in a condominium with two steps to enter. DME equipment includes a cane and walker.  Pt states she does her own ADLs but does have [facility] come in every 3-4 days for three hrs. to clean and cook for her. States her neighbor brings her breakfast every morning. Pt states she never  or had children. States her nieces and nephews are nonexistent. 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 States she retired from [employer] in  on the day Rick . Pt has been to [facility] now [facility] for [facility] and has used [facility] . Pt states she would like to go back to [facility] because their food is so good. Referral placed for [facility] but no beds available. Referrals placed to additional SNF. PT recommends SNF at D/C. Pt’s PCP is Dr Rincon. Uses [facility] on [address]. Plan is to D/C on [facility]. Enrolled in meds to bed. Pt does not drive. At discharge, pt. will need CCP to setup transport. Explained that CCP would follow to assess for discharge needs.  Conner Michel RN-BC        Discharge Codes    10/8/2021             Conner Michel, RN

## 2021-10-06 NOTE — CONSULTS
"Adult Nutrition  Assessment/PES    Patient Name:  Shira Davila  YOB: 1921  MRN: [id number]  Admit Date:  10/5/2021    Assessment Date:  10/6/2021    Comments:  Nutrition consult for chronic poor po. Pt here with DVT, CHF. Pt states she struggles with po, can't cook for herself anymore, difficulty chewing. C/o constipation.  KUB noted. Doesn't want ensure \"it binds me up\".  Will add prune juice bid but pt likely needs a scheduled laxative.     Will follow clinical course, nutrition needs.    Reason for Assessment     Row Name 10/06/21 1327          Reason for Assessment    Reason For Assessment  identified at risk by screening criteria;nurse/nurse practitioner consult     Diagnosis  cardiac disease Femoral DVT, CHF, cardiomyopathy, CKD     Identified At Risk by Screening Criteria  difficulty chewing/swallowing         Nutrition/Diet History     Row Name 10/06/21 1329          Nutrition/Diet History    Typical Food/Fluid Intake  can no longer cook for heraelf. Needs very soft food     Food Preferences  dislikes broccoli     Supplemental Drinks/Foods/Additives  Ensure \"binds me up\"     Factors Affecting Nutritional Intake  chewing difficulties/inability to chew food         Anthropometrics     Row Name 10/06/21 1330          Anthropometrics    Height  162.6 cm (64\")        Admit Weight    Admit Weight  53.7 kg (118 lb 6.2 oz)        Ideal Body Weight (IBW)    Ideal Body Weight (IBW) (kg)  55     % of Ideal Body Weight Assessment  -- 97% IBW        Body Mass Index (BMI)    BMI Assessment  BMI 18.5-24.9: normal BMI 20.3         Labs/Tests/Procedures/Meds     Row Name 10/06/21 1332          Labs/Procedures/Meds    Lab Results Reviewed  reviewed, pertinent     Lab Results Comments  BUN, Cr,        Diagnostic Tests/Procedures    Diagnostic Test/Procedure Reviewed  reviewed, pertinent     Diagnostic Test/Procedures Comments  KUB - Moderate colonic fecal retention        Medications    Pertinent Medications " "Reviewed  reviewed, pertinent     Pertinent Medications Comments  asa, lovenox, lasix, kcl, B12         Physical Findings     Row Name 10/06/21 1333          Physical Findings    Oral/Mouth Cavity  poor dentition     Skin  pressure injury coccyx PI         Estimated/Assessed Needs     Row Name 10/06/21 1334 10/06/21 1330       Calculation Measurements    Weight Used For Calculations  53.7 kg (118 lb 6.2 oz)  --    Height  --  162.6 cm (64\")       Estimated/Assessed Needs    Additional Documentation  KCAL/KG (Group);Protein Requirements (Group);Fluid Requirements (Group)  --       KCAL/KG    KCAL/KG  30 Kcal/Kg (kcal)  --    30 Kcal/Kg (kcal)  1611  --       Protein Requirements    Weight Used For Protein Calculations  53.7 kg (118 lb 6.2 oz)  --    Est Protein Requirement Amount (gms/kg)  1.2 gm protein  --    Estimated Protein Requirements (gms/day)  64.44  --        Nutrition Prescription Ordered     Row Name 10/06/21 1338          Nutrition Prescription PO    Current PO Diet  Soft Texture     Texture  Ground     Common Modifiers  Cardiac         Evaluation of Received Nutrient/Fluid Intake     Row Name 10/06/21 1340          PO Evaluation    % PO Intake  25%         Problem/Interventions:  Problem 1     Row Name 10/06/21 1340          Nutrition Diagnoses Problem 1    Problem 1  Increased Nutrient Needs     Macronutrient  Kcal;Protein     Signs/Symptoms (evidenced by)  Report of Mnimal PO Intake         Intervention Goal     Row Name 10/06/21 1341          Intervention Goal    General  Maintain nutrition;Reduce/improve symptoms;Improved nutrition related lab(s);Meet nutritional needs for age/condition;Disease management/therapy     PO  Tolerate PO;Increase intake;PO intake (%)     PO Intake %  75 %     Weight  Maintain weight         Nutrition Intervention     Row Name 10/06/21 1341          Nutrition Intervention    RD/Tech Action  Follow Tx progress;Care plan reviewd;Encourage intake;Interview for " preference;Advise available snack;Advise alternate selection         Nutrition Prescription     Row Name 10/06/21 1341          Nutrition Prescription PO    PO Prescription  Begin/change diet;Begin/change supplement     Begin/Change Diet to  Soft Texture     Texture  Ground     Supplement  Other (comment) prune juice     Supplement Frequency  2 times a day     New PO Prescription Ordered?  Yes         Education/Evaluation     Row Name 10/06/21 1341          Education    Education  Will Instruct as appropriate        Monitor/Evaluation    Monitor  Per protocol;Skin status;Symptoms;I&O;PO intake;Pertinent labs;Weight           Electronically signed by:  Treva Nagy RD  10/06/21 13:44 EDT

## 2021-10-06 NOTE — THERAPY EVALUATION
Patient Name: Shira Davila  : 1921    MRN: [id number]                              Today's Date: 10/6/2021       Admit Date: 10/5/2021    Visit Dx:     ICD-10-CM ICD-9-CM   1. Acute congestive heart failure, unspecified heart failure type ([facility])  I50.9 428.0   2. Atrial fibrillation with rapid ventricular response ([facility])  I48.91 427.31   3. DVT, lower extremity, proximal, acute, right ([facility])  I82.4Y1 453.41     Patient Active Problem List   Diagnosis   • Abdominal pain, vomiting, and diarrhea   • Chronic constipation   • Hemorrhoid   • Arthralgia of hip   • LBP (low back pain)   • Disease of thyroid gland   • Benign essential HTN   • Chronic fatigue   • Chronic systolic CHF (congestive heart failure) ([facility])   • Coronary artery disease   • Colitis   • Bilateral impacted cerumen   • Anxiety   • Fungal infection   • Poor social situation   • Leg pain, anterior, right   • Poor mobility   • Weight loss   • Bilateral leg edema   • Stage 3a chronic kidney disease ([facility])   • Acute deep vein thrombosis (DVT) of femoral vein of right lower extremity ([facility])   • Acute on chronic systolic CHF (congestive heart failure) ([facility])   • Pleural effusion, bilateral   • V-tach ([facility])   • New onset a-fib ([facility])     Past Medical History:   Diagnosis Date   • Anxiety    • Arthritis    • CHF (congestive heart failure) ([facility])    • Coronary artery disease    • Disease of thyroid gland 3/16/2016   • Diverticulitis    • Hypertension      Past Surgical History:   Procedure Laterality Date   • ABDOMINAL SURGERY      liban   • APPENDECTOMY     • COLON SURGERY      fistulectomy   • EYE SURGERY      cataract   • HYSTERECTOMY     • TONSILLECTOMY       General Information     Row Name 10/06/21 1502          Physical Therapy Time and Intention    Document Type  evaluation  -CF     Mode of Treatment  co-treatment;physical therapy;occupational therapy to maximize participation and for safety as RN states they were unable to mobilize with assist x2  -CF      Row Name 10/06/21 1502          General Information    Prior Level of Function  independent:;transfer;all household mobility;ADL's  -CF     Existing Precautions/Restrictions  fall  -CF     Row Name 10/06/21 1502          Living Environment    Lives With  alone  -CF     Row Name 10/06/21 1502          Home Main Entrance    Number of Stairs, Main Entrance  none  -CF     Row Name 10/06/21 1502          Cognition    Orientation Status (Cognition)  oriented to;person;place  -CF     Row Name 10/06/21 1502          Safety Issues, Functional Mobility    Safety Issues Affecting Function (Mobility)  ability to follow commands;insight into deficits/self-awareness;awareness of need for assistance;safety precautions follow-through/compliance;safety precaution awareness;impulsivity  -CF     Impairments Affecting Function (Mobility)  balance;endurance/activity tolerance;cognition;pain;strength  -CF       User Key  (r) = Recorded By, (t) = Taken By, (c) = Cosigned By    Initials Name Provider Type    CF Vidya Martinez PT Physical Therapist        Mobility     Row Name 10/06/21 1503          Bed Mobility    Bed Mobility  supine-sit;sit-supine  -CF     Supine-Sit [place] (Bed Mobility)  standby assist  -CF     Sit-Supine [place] (Bed Mobility)  standby assist  -CF     Assistive Device (Bed Mobility)  head of bed elevated;bed rails  -     Row Name 10/06/21 1503          Sit-Stand Transfer    Sit-Stand [place] (Transfers)  contact guard  -     Assistive Device (Sit-Stand Transfers)  cane, straight  -CF     Row Name 10/06/21 1503          Gait/Stairs (Locomotion)    [place] Level (Gait)  contact guard;minimum assist (75% patient effort);verbal cues  -     Assistive Device (Gait)  cane, straight  -     Distance in Feet (Gait)  30' then 5'  -CF     Deviations/Abnormal Patterns (Gait)  joselyn decreased;gait speed decreased;stride length decreased  -CF     Bilateral Gait Deviations  forward flexed posture   -CF     Comment (Gait/Stairs)  Difficult to redirect during gait, pt hitting cane on floor to reiterate a point she wants to make versus for balance assist  -CF       User Key  (r) = Recorded By, (t) = Taken By, (c) = Cosigned By    Initials Name Provider Type    Vidya Delgado PT Physical Therapist        Obj/Interventions     Row Name 10/06/21 1505          Range of Motion Comprehensive    General Range of Motion  bilateral lower extremity ROM WFL  -CF     Row Name 10/06/21 1505          Strength Comprehensive (MMT)    Comment, General Manual Muscle Testing (MMT) Assessment  Generalized weakness, formal MMT not assessed  -CF     Row Name 10/06/21 1505          Balance    Balance Assessment  sitting static balance;sitting dynamic balance;standing dynamic balance;standing static balance  -CF     Static Sitting Balance  WFL;sitting, edge of bed  -CF     Dynamic Sitting Balance  WFL;sitting, edge of bed  -CF     Static Standing Balance  mild impairment;supported  -CF     Dynamic Standing Balance  mild impairment;supported  -CF       User Key  (r) = Recorded By, (t) = Taken By, (c) = Cosigned By    Initials Name Provider Type    CF Vidya Martinez, MOLLY Physical Therapist        Goals/Plan     Row Name 10/06/21 1511          Bed Mobility Goal 1 (PT)    Activity/Assistive Device (Bed Mobility Goal 1, PT)  bed mobility activities, all  -CF     [place] Level/Cues Needed (Bed Mobility Goal 1, PT)  independent  -CF     Time Frame (Bed Mobility Goal 1, PT)  2 weeks  -CF     Row Name 10/06/21 1511          Transfer Goal 1 (PT)    Activity/Assistive Device (Transfer Goal 1, PT)  sit-to-stand/stand-to-sit;bed-to-chair/chair-to-bed;cane, straight  -CF     [place] Level/Cues Needed (Transfer Goal 1, PT)  standby assist  -CF     Time Frame (Transfer Goal 1, PT)  2 weeks  -     Row Name 10/06/21 1511          Gait Training Goal 1 (PT)    Activity/Assistive Device (Gait Training Goal 1, PT)  gait (walking  "locomotion);cane, straight  -CF     [place] Level (Gait Training Goal 1, PT)  standby assist  -CF     Distance (Gait Training Goal 1, PT)  100'  -CF     Time Frame (Gait Training Goal 1, PT)  2 weeks  -CF       User Key  (r) = Recorded By, (t) = Taken By, (c) = Cosigned By    Initials Name Provider Type    CF Vidya Martinez, PT Physical Therapist        Clinical Impression     Row Name 10/06/21 1505          Plan of Care Review    Plan of Care Reviewed With  patient  -CF     Outcome Summary  Pt is a 89 yo female admitted with increased SOA and B LE edema. Workup revealed acute R femoral DVT and was started on Lovenox. OK to work with therapy at this time. Pt reports living alone and is fully IND with ADL and mobility. Pt with difficulty staying on task and providing subjective history information. Pt very fixated on certain things and requires increased time to attempt redirection. Pt used straight cane for transfers and ambulation in room with cga. No overt LOB but pt appears unsteady. Pt initially not wanting to return to bed stating she feels \"trapped\" in the bed. Brought recliner in room to set up however by that point pt was adament about returning to bed. Pt left in bed with all known needs met and RN aware. Recommending d/c to SNF as pt does not seem safe to return home alone.  -CF     Row Name 10/06/21 1505          Therapy Assessment/Plan (PT)    Rehab Potential (PT)  good, to achieve stated therapy goals  -CF     Criteria for Skilled Interventions Met (PT)  yes  -CF     Predicted Duration of Therapy Intervention (PT)  2 weeks  -CF     Row Name 10/06/21 1506          Vital Signs    O2 Delivery Pre Treatment  room air  -CF     O2 Delivery Intra Treatment  room air  -CF     O2 Delivery Post Treatment  room air  -CF     Row Name 10/06/21 1506          Positioning and Restraints    Pre-Treatment Position  in bed  -CF     Post Treatment Position  bed  -CF     In Bed  side rails up x3;notified nsg;call " light within reach;encouraged to call for assist;exit alarm on;heels elevated;fowlers  -CF       User Key  (r) = Recorded By, (t) = Taken By, (c) = Cosigned By    Initials Name Provider Type     Vidya Martinez, MOLLY Physical Therapist        Outcome Measures     Row Name 10/06/21 1512          How much help from another person do you currently need...    Turning from your back to your side while in flat bed without using bedrails?  3  -CF     Moving from lying on back to sitting on the side of a flat bed without bedrails?  3  -CF     Moving to and from a bed to a chair (including a wheelchair)?  3  -CF     Standing up from a chair using your arms (e.g., wheelchair, bedside chair)?  3  -CF     Climbing 3-5 steps with a railing?  2  -CF     To walk in hospital room?  3  -CF     AM-PAC 6 Clicks Score (PT)  17  -CF     Row Name 10/06/21 1512 10/06/21 1019       Functional Assessment    Outcome Measure Options  AM-PAC 6 Clicks Basic Mobility (PT)  -CF  AM-PAC 6 Clicks Daily Activity (OT)  -SM      User Key  (r) = Recorded By, (t) = Taken By, (c) = Cosigned By    Initials Name Provider Type    Jody Marshall OT Occupational Therapist     Vidya Martinez PT Physical Therapist                       Physical Therapy Education                 Title: PT OT SLP Therapies (In Progress)     Topic: Physical Therapy (In Progress)     Point: Mobility training (In Progress)     Learning Progress Summary           Patient Acceptance, E, NR by  at 10/6/2021 1512                   Point: Home exercise program (Not Started)     Learner Progress:  Not documented in this visit.          Point: Body mechanics (Not Started)     Learner Progress:  Not documented in this visit.          Point: Precautions (Not Started)     Learner Progress:  Not documented in this visit.                      User Key     Initials Effective Dates Name Provider Type Discipline     06/16/21 -  Vidya Martinez, MOLLY Physical Therapist PT        "       PT Recommendation and Plan  Planned Therapy Interventions (PT): balance training, bed mobility training, gait training, patient/family education, strengthening, transfer training, ROM (range of motion)  Plan of Care Reviewed With: patient  Outcome Summary: Pt is a 89 yo female admitted with increased SOA and B LE edema. Workup revealed acute R femoral DVT and was started on Lovenox. OK to work with therapy at this time. Pt reports living alone and is fully IND with ADL and mobility. Pt with difficulty staying on task and providing subjective history information. Pt very fixated on certain things and requires increased time to attempt redirection. Pt used straight cane for transfers and ambulation in room with cga. No overt LOB but pt appears unsteady. Pt initially not wanting to return to bed stating she feels \"trapped\" in the bed. Brought recliner in room to set up however by that point pt was adament about returning to bed. Pt left in bed with all known needs met and RN aware. Recommending d/c to SNF as pt does not seem safe to return home alone.     Time Calculation:   PT Charges     Row Name 10/06/21 1501             Time Calculation    Start Time  0930  -CF      Stop Time  1002  -      Time Calculation (min)  32 min  -      PT Received On  10/06/21  -      PT - Next Appointment  10/07/21  -      PT Goal Re-Cert Due Date  10/20/21  -         Time Calculation- PT    Total Timed Code Minutes- PT  25 minute(s)  -CF        User Key  (r) = Recorded By, (t) = Taken By, (c) = Cosigned By    Initials Name Provider Type    CF Vidya Martinez, MOLLY Physical Therapist        Therapy Charges for Today     Code Description Service Date Service Provider Modifiers Qty    [id number]  PT EVAL MOD COMPLEXITY 2 10/6/2021 Vidya Martinez, PT GP 1    [id number]  PT THERAPEUTIC ACT EA 15 MIN 10/6/2021 Vidya Martinez, PT GP 1    [id number]  PT THER PROC EA 15 MIN 10/6/2021 Vidya Martinez, PT GP 1    "       PT G-Codes  Outcome Measure Options: AM-PAC 6 Clicks Basic Mobility (PT)  AM-PAC 6 Clicks Score (PT): 17  AM-PAC 6 Clicks Score (OT): 16    Vidya Martinez PT  10/6/2021

## 2021-10-06 NOTE — PLAN OF CARE
Goal Outcome Evaluation:              Outcome Summary: P admitted to [facility]. Attempted to call nephew for home med verification. 2 attempts to call with no answer and no voicemail to leave message. Will try again later. Home meds completed with meds brought from home (lasix, bit b 12, ASA, solifenacin)

## 2021-10-06 NOTE — PLAN OF CARE
Problem: Adult Inpatient Plan of Care  Goal: Plan of Care Review  Outcome: Ongoing, Progressing  Flowsheets (Taken 10/6/2021 1818)  Progress: no change  Plan of Care Reviewed With: patient  Outcome Summary: VSS. KUB and medications ordered for complaints of constipation. Seen by wound nurse- venelex applied to heels, ronal boots on, cream to coccyx. No c/o pain. Will continue to monitor.

## 2021-10-06 NOTE — THERAPY EVALUATION
Patient Name: Shira Davila  : 1921    MRN: [id number]                              Today's Date: 10/6/2021       Admit Date: 10/5/2021    Visit Dx:     ICD-10-CM ICD-9-CM   1. Acute congestive heart failure, unspecified heart failure type (Former[facility])  I50.9 428.0   2. Atrial fibrillation with rapid ventricular response ([facility])  I48.91 427.31   3. DVT, lower extremity, proximal, acute, right ([facility])  I82.4Y1 453.41     Patient Active Problem List   Diagnosis   • Abdominal pain, vomiting, and diarrhea   • Chronic constipation   • Hemorrhoid   • BP (high blood pressure)   • Arthralgia of hip   • LBP (low back pain)   • Disease of thyroid gland   • Benign essential HTN   • Chronic kidney disease, stage III (moderate) (CMS/[facility])   • Chronic fatigue   • Chronic systolic CHF (congestive heart failure) ([facility]ly [facility])   • Coronary artery disease   • Colitis   • Bilateral impacted cerumen   • Anxiety   • Fungal infection   • Poor social situation   • Leg pain, anterior, right   • Poor mobility   • Weight loss   • Bilateral leg edema   • Acute congestive heart failure (HCC)   • Deep vein thrombosis (DVT) of femoral vein ([facility])   • Atrial fibrillation ([facility])     Past Medical History:   Diagnosis Date   • Anxiety    • Arthritis    • CHF (congestive heart failure) ([facility])    • Coronary artery disease    • Disease of thyroid gland 3/16/2016   • Diverticulitis    • Hypertension      Past Surgical History:   Procedure Laterality Date   • ABDOMINAL SURGERY      liban   • APPENDECTOMY     • COLON SURGERY      fistulectomy   • EYE SURGERY      cataract   • HYSTERECTOMY     • TONSILLECTOMY       General Information     Row Name 10/06/21 1007          OT Time and Intention    Document Type  evaluation  -SM     Mode of Treatment  occupational therapy;physical therapy;co-treatment co treat for safety with two skilled therapist per nsg documentation unsuccessful attempts at mobilization with X2 assist.  -SM     Row Name 10/06/21 1007          General  Information    Patient Profile Reviewed  yes  -     Prior Level of Function  independent:;ADL's;all household mobility  -     Existing Precautions/Restrictions  fall  -     Row Name 10/06/21 1007          Occupational Profile    Environmental Supports and Barriers (Occupational Profile)  Pt uses SPC, denies other use of DME/AD.  -     Row Name 10/06/21 1007          Living Environment    Lives With  alone  -     Row Name 10/06/21 1007          Cognition    Orientation Status (Cognition)  oriented to;person;place  -     Row Name 10/06/21 1007          Safety Issues, Functional Mobility    Safety Issues Affecting Function (Mobility)  ability to follow commands;safety precautions follow-through/compliance;safety precaution awareness;impulsivity;positioning of assistive device;awareness of need for assistance;insight into deficits/self-awareness  -     Impairments Affecting Function (Mobility)  balance;pain;endurance/activity tolerance;strength  -       User Key  (r) = Recorded By, (t) = Taken By, (c) = Cosigned By    Initials Name Provider Type     Jody Ferrer OT Occupational Therapist          Mobility/ADL's     Row Name 10/06/21 1009          Bed Mobility    Bed Mobility  supine-sit;sit-supine  -     Supine-Sit [place] (Bed Mobility)  standby assist  -     Sit-Supine [place] (Bed Mobility)  standby assist  -     Assistive Device (Bed Mobility)  head of bed elevated  -     Row Name 10/06/21 1009          Transfers    Transfers  sit-stand transfer  -     Sit-Stand [place] (Transfers)  contact guard  -     Row Name 10/06/21 1009          Sit-Stand Transfer    Assistive Device (Sit-Stand Transfers)  cane, straight  -     Row Name 10/06/21 1009          Functional Mobility    Functional Mobility- Ind. Level  contact guard assist  -     Functional Mobility- Device  straight cane  -     Functional Mobility- Comment  needs continued vc's for safety with SPC, unsafe  "behaviour noted with attempting to use SPC to moving objects on ground.  -     Row Name 10/06/21 1009          Activities of Daily Living    BADL Assessment/Intervention  lower body dressing;feeding;toileting  -[facility] Name 10/06/21 1009          Lower Body Dressing Assessment/Training    [place] Level (Lower Body Dressing)  don;shoes/slippers;socks;maximum assist (25% patient effort)  -     Position (Lower Body Dressing)  edge of bed sitting  -     Comment (Lower Body Dressing)  increased difficulty with RLE.  -     Row Name 10/06/21 1009          Self-Feeding Assessment/Training    [place] Level (Feeding)  feeding skills;set up  -     Position (Self-Feeding)  sitting up in bed  -[facility] Name 10/06/21 1009          Toileting Assessment/Training    Comment (Toileting)  purewick in place, continues to report to OT \"I have a bathroom problem\" but unable to give more information when asked and denies need to use bathroom this encounter.  -       User Key  (r) = Recorded By, (t) = Taken By, (c) = Cosigned By    Initials Name Provider Type    Jody Marshall, OT Occupational Therapist        Obj/Interventions     Row Name 10/06/21 1011          Vision Assessment/Intervention    Visual Impairment/Limitations  WFL  -[facility] Name 10/06/21 1011          Range of Motion Comprehensive    Comment, General Range of Motion  difficult to asses, pt having difficulty staying on tract/following commands throughout session for assessment of AROM but appears to have some deficits with shoulder flexion carson. during ADL.  -[facility] Name 10/06/21 1011          Strength Comprehensive (MMT)    Comment, General Manual Muscle Testing (MMT) Assessment  generalized weakness, unable to complete MMT due to pt cogntive status  -[facility] Name 10/06/21 1011          Balance    Balance Assessment  sitting static balance;standing static balance;standing dynamic balance  -     Static Sitting Balance  " WFL;sitting, edge of bed  -SM     Static Standing Balance  mild impairment;supported  -SM     Dynamic Standing Balance  mild impairment;supported  -SM     Comment, Balance  CGA with SPC  -SM       User Key  (r) = Recorded By, (t) = Taken By, (c) = Cosigned By    Initials Name Provider Type    Jody Marshall OT Occupational Therapist        Goals/Plan     Row Name 10/06/21 1018          Transfer Goal 1 (OT)    Activity/Assistive Device (Transfer Goal 1, OT)  transfers, all;toilet;bed-to-chair/chair-to-bed  -SM     [place] Level/Cues Needed (Transfer Goal 1, OT)  supervision required  -SM     Time Frame (Transfer Goal 1, OT)  short term goal (STG);2 weeks  -SM     Progress/Outcome (Transfer Goal 1, OT)  goal ongoing  -SM     Row Name 10/06/21 1018          Bathing Goal 1 (OT)    Activity/Device (Bathing Goal 1, OT)  bathing skills, all  -SM     [place] Level/Cues Needed (Bathing Goal 1, OT)  supervision required  -SM     Time Frame (Bathing Goal 1, OT)  short term goal (STG);2 weeks  -SM     Progress/Outcomes (Bathing Goal 1, OT)  goal ongoing  -SM     Row Name 10/06/21 1018          Dressing Goal 1 (OT)    Activity/Device (Dressing Goal 1, OT)  dressing skills, all  -SM     [place]/Cues Needed (Dressing Goal 1, OT)  supervision required  -SM     Time Frame (Dressing Goal 1, OT)  short term goal (STG);2 weeks  -SM     Progress/Outcome (Dressing Goal 1, OT)  goal ongoing  -     Row Name 10/06/21 1018          Toileting Goal 1 (OT)    Activity/Device (Toileting Goal 1, OT)  toileting skills, all  -SM     [place] Level/Cues Needed (Toileting Goal 1, OT)  supervision required  -SM     Time Frame (Toileting Goal 1, OT)  short term goal (STG);2 weeks  -SM     Progress/Outcome (Toileting Goal 1, OT)  goal ongoing  -SM     Row Name 10/06/21 1018          Grooming Goal 1 (OT)    Activity/Device (Grooming Goal 1, OT)  grooming skills, all  -SM     [place] (Grooming Goal 1, OT)  supervision  required  -     Time Frame (Grooming Goal 1, OT)  short term goal (STG);2 weeks  -     Strategies/Barriers (Grooming Goal 1, OT)  sink side standing.  -     Row Name 10/06/21 1018          Therapy Assessment/Plan (OT)    Planned Therapy Interventions (OT)  activity tolerance training;adaptive equipment training;BADL retraining;cognitive/visual perception retraining;edema control/reduction;functional balance retraining;IADL retraining;occupation/activity based interventions;patient/caregiver education/training;ROM/therapeutic exercise;strengthening exercise;transfer/mobility retraining  -       User Key  (r) = Recorded By, (t) = Taken By, (c) = Cosigned By    Initials Name Provider Type     Jody Ferrer, VAN Occupational Therapist        Clinical Impression     Row Name 10/06/21 1013          Pain Assessment    Additional Documentation  Pain Scale: FACES Pre/Post-Treatment (Group)  -     Row Name 10/06/21 1013          Pain Scale: FACES Pre/Post-Treatment    Pain: FACES Scale, Pretreatment  0-->no hurt  -     Pre/Posttreatment Pain Comment  no c/o of pain, c/o of not being able to move RLE.  -     Row Name 10/06/21 1013          Plan of Care Review    Plan of Care Reviewed With  patient  -     Outcome Summary  Pt is a 89 y.o female admitted to [facility] with BLE swelling and SOB, has hx of CHF. + for RLE DVT currently treated with Lovenox, s/p theraputic time for mobilization with therapies. She lives alone per her report and independent with her ADLs and mobility. Pt very difficult to redirect today and difficult. She does perform functional mobility to bathroom with CGA with SPC but has poor safety awareness and has difficulty following safety commands throughout. She has increased difficulty with LBD for RLE and requires Max A. Pt is a high falls risk. Recommend continued OT to address safety with ADL and transfers. Recommend SNF at [place].  -     Row Name 10/06/21 1013          Therapy  Assessment/Plan (OT)    Rehab Potential (OT)  good, to achieve stated therapy goals  -     Criteria for Skilled Therapeutic Interventions Met (OT)  yes;skilled treatment is necessary  -     Therapy Frequency (OT)  5 times/wk  -     Row Name 10/06/21 1013          Therapy Plan Review/Discharge Plan (OT)    Anticipated Discharge Disposition (OT)  [facility] nursing facility  -     Row Name 10/06/21 1013          Vital Signs    O2 Delivery Pre Treatment  room air  -     O2 Delivery Intra Treatment  room air  -     O2 Delivery Post Treatment  room air  -     Row Name 10/06/21 1013          Positioning and Restraints    Pre-Treatment Position  in bed  -SM     Post Treatment Position  bed  -SM     In Bed  fowlers;side rails up x3;encouraged to call for assist;exit alarm on;notified nsg;call light within reach  -       User Key  (r) = Recorded By, (t) = Taken By, (c) = Cosigned By    Initials Name Provider Type    Jody Marshall OT Occupational Therapist        Outcome Measures     Row Name 10/06/21 1019          How much help from another is currently needed...    Putting on and taking off regular lower body clothing?  2  -SM     Bathing (including washing, rinsing, and drying)  2  -SM     Toileting (which includes using toilet bed pan or urinal)  2  -SM     Putting on and taking off regular upper body clothing  3  -SM     Taking care of personal grooming (such as brushing teeth)  3  -SM     Eating meals  4  -SM     AM-PAC 6 Clicks Score (OT)  16  -     Row Name 10/06/21 1019          Functional Assessment    Outcome Measure Options  AM-PAC 6 Clicks Daily Activity (OT)  -       User Key  (r) = Recorded By, (t) = Taken By, (c) = Cosigned By    Initials Name Provider Type    Jody Marshall OT Occupational Therapist          Occupational Therapy Education                 Title: PT OT SLP Therapies (In Progress)     Topic: Occupational Therapy (In Progress)     Point: ADL training (Done)      Description:   Instruct learner(s) on proper safety adaptation and remediation techniques during self care or transfers.   Instruct in proper use of assistive devices.              Learning Progress Summary           Patient Acceptance, E, VU by  at 10/6/2021 1019    Comment: Education with safety with placement of SPC during transfers and funcitonal mobility for ADL. Education regarding role of OT and POC.                   Point: Home exercise program (Not Started)     Description:   Instruct learner(s) on appropriate technique for monitoring, assisting and/or progressing therapeutic exercises/activities.              Learner Progress:  Not documented in this visit.          Point: Precautions (Not Started)     Description:   Instruct learner(s) on prescribed precautions during self-care and functional transfers.              Learner Progress:  Not documented in this visit.          Point: Body mechanics (Not Started)     Description:   Instruct learner(s) on proper positioning and spine alignment during self-care, functional mobility activities and/or exercises.              Learner Progress:  Not documented in this visit.                      User Key     Initials Effective Dates Name Provider Type Discipline     04/02/20 -  Jody Ferrer OT Occupational Therapist OT              OT Recommendation and Plan  Planned Therapy Interventions (OT): activity tolerance training, adaptive equipment training, BADL retraining, cognitive/visual perception retraining, edema control/reduction, functional balance retraining, IADL retraining, occupation/activity based interventions, patient/caregiver education/training, ROM/therapeutic exercise, strengthening exercise, transfer/mobility retraining  Therapy Frequency (OT): 5 times/wk  Plan of Care Review  Plan of Care Reviewed With: patient  Outcome Summary: Pt is a 89 y.o female admitted to [facility] with BLE swelling and SOB, has hx of CHF. + for RLE DVT currently treated  with Lovenox, s/p theraputic time for mobilization with therapies. She lives alone per her report and independent with her ADLs and mobility. Pt very difficult to redirect today and difficult. She does perform functional mobility to bathroom with CGA with SPC but has poor safety awareness and has difficulty following safety commands throughout. She has increased difficulty with LBD for RLE and requires Max A. Pt is a high falls risk. Recommend continued OT to address safety with ADL and transfers. Recommend SNF at [place].     Time Calculation:   Time Calculation- OT     Row Name 10/06/21 1020             Time Calculation- OT    OT Start Time  0930  -      OT Stop Time  1002  -SM      OT Time Calculation (min)  32 min  -SM      Total Timed Code Minutes- OT  23 minute(s)  -SM      OT Received On  10/06/21  -      OT - Next Appointment  10/07/21  -      OT Goal Re-Cert Due Date  10/20/21  -         Timed Charges    13487 - OT Therapeutic Activity Minutes  15  -SM      76203 - OT Self Care/Mgmt Minutes  8  -SM         Untimed Charges    OT Eval/Re-eval Minutes  9  -SM         Total Minutes    Timed Charges Total Minutes  23  -SM      Untimed Charges Total Minutes  9  -SM       Total Minutes  32  -SM        User Key  (r) = Recorded By, (t) = Taken By, (c) = Cosigned By    Initials Name Provider Type     Jody Ferrer OT Occupational Therapist        Therapy Charges for Today     Code Description Service Date Service Provider Modifiers Qty    03893442982 HC OT EVAL MOD COMPLEXITY 2 10/6/2021 Joyd Ferrer OT GO 1    91043108850 HC OT SELF CARE/MGMT/TRAIN EA 15 MIN 10/6/2021 Jody Ferrer OT GO 1    26753890924 HC OT THERAPEUTIC ACT EA 15 MIN 10/6/2021 Jody Ferrer OT GO 1               Jody Ferrer OT  10/6/2021

## 2021-10-06 NOTE — CONSULTS
Date of Consultation: 10/06/21    Referral Provider: Gera Rodrigues MD     Reason for Consultation: New atrial fibrillation, CHF.    Encounter Provider: Onel Herrear MD    Group of Service: [facility]     Patient Name: Shira Davila    :1921    Chief complaint: Shortness of breath, lower extremity edema.    History of Present Illness:    This is a 89 year young female patient with a history of CHF, CAD,  hypertension, cardiomyopathy, and GERD. She has followed in the past with [facility] and was last seen in their office in 2015. According to their records, she had an echocardiogram in 2011 showed EF 35-40%, left ventricular hypertrophy, and diastolic dysfunction.     She presented to [facility] on 10/05/2021 with complaints of shortness of breath and leg swelling. She was found to have RLE DVT by doppler, as well as CHF exacerbation. She was started on Lovenox. She was given IV Lasix and admitted for further evaluation.  She is feeling better since she was started on diuretics.  She is in rate controlled atrial fibrillation, which is new.  Her rate when I saw her earlier today was in the 60s to 70s.                  Past Medical History:   Diagnosis Date   • Anxiety    • Arthritis    • CHF (congestive heart failure) (HCC)    • Coronary artery disease    • Disease of thyroid gland 3/16/2016   • Diverticulitis    • Hypertension          Past Surgical History:   Procedure Laterality Date   • ABDOMINAL SURGERY      liban   • APPENDECTOMY     • COLON SURGERY      fistulectomy   • EYE SURGERY      cataract   • HYSTERECTOMY     • TONSILLECTOMY           Allergies   Allergen Reactions   • Cephalexin Diarrhea   • Atorvastatin Calcium Unknown - Low Severity   • Sesame Oil Unknown - Low Severity   • Amoxicillin Unknown - Low Severity   • Cortisone Unknown - Low Severity   • Diazepam Unknown - Low Severity   • Doxycycline Rash   • Erythromycin Unknown  - Low Severity   • Horse-Derived Products Unknown - Low Severity   • Levoxyl [Levothyroxine Sodium] Unknown - Low Severity   • Lexapro [Escitalopram Oxalate] Unknown - Low Severity   • Lipitor [Atorvastatin] Unknown - Low Severity   • Lopressor [Metoprolol Tartrate] Unknown - Low Severity   • Metaxalone Unknown - Low Severity   • Omeprazole Unknown - Low Severity   • Penicillins Unknown - Low Severity     AND ALL RELATED    • Sesame Seed (Diagnostic) Unknown - Low Severity   • Solarcaine [Benzocaine] Unknown - Low Severity   • Sulfa Antibiotics Unknown - Low Severity         No current facility-administered medications on file prior to encounter.     Current Outpatient Medications on File Prior to Encounter   Medication Sig Dispense Refill   • aspirin 81 MG tablet Take 81 mg by mouth Daily.     • furosemide (LASIX) 20 MG tablet Take 1 tablet by mouth Daily. 90 tablet 6   • solifenacin (VESICARE) 5 MG tablet TAKE 1 TABLET BY MOUTH EVERY DAY 90 tablet 1   • vitamin B-12 (CYANOCOBALAMIN) 500 MCG tablet Take 500 mcg by mouth Daily.     • clonazePAM (KlonoPIN) 0.5 MG tablet Take 1 tablet by mouth Daily As Needed for Anxiety. 30 tablet 3   • nystatin-zinc oxide Apply to bid to skin 100 g 11   • potassium chloride (K-DUR,KLOR-CON) 10 MEQ CR tablet Take 1 tablet by mouth 2 (Two) Times a Day. 60 tablet 3   • pyridoxine (B6 NATURAL) 100 MG tablet Take 100 mg by mouth Daily.           Social History     Socioeconomic History   • Marital status: Single     Spouse name: Not on file   • Number of children: Not on file   • Years of education: Not on file   • Highest education level: Not on file   Tobacco Use   • Smoking status: Never Smoker   • Smokeless tobacco: Never Used   Substance and Sexual Activity   • Alcohol use: No   • Drug use: No   • Sexual activity: Defer         Family History   Problem Relation Age of Onset   • Hypertension Other        REVIEW OF SYSTEMS:   Pertinent positives were noted in the HPI above.   "Otherwise, all other systems were reviewed, and are negative.     Objective:     Vitals:    10/05/21 2222 10/05/21 2312 10/06/21 0805 10/06/21 0818   BP: 139/82  127/73    BP Location: Right arm  Right arm    Patient Position: Lying  Lying    Pulse: 76  66 80   Resp: 16  16    Temp:  98.2 °F (36.8 °C) 97.5 °F (36.4 °C)    TempSrc: Oral Oral Oral    SpO2: 91%  94%    Weight: 53.7 kg (118 lb 6.2 oz)      Height:         Body mass index is 20.32 kg/m².  Flowsheet Rows      First Filed Value   Admission Height  162.6 cm (64\") Documented at 10/05/2021 2056   Admission Weight  58.5 kg (129 lb) Documented at 10/05/2021 2118           General:    No acute distress, alert and oriented x4, pleasant, elderly                   Head:    Normocephalic, atraumatic.   Eyes:          Conjunctivae and sclerae normal, no icterus, PERRLA   Throat:   No oral lesions, no thrush, oral mucosa moist.    Neck:   Supple, trachea midline.   Lungs:     Decreased breath sounds at the bases    Heart:    Irregularly irregular rhythm with a normal rate.  II/VI SM throughout.   Abdomen:     Soft, non-tender, non-distended, positive bowel sounds.    Extremities:   1+ edema bilateral lower extremities (R > L).  Venous stasis changes noted.   Pulses:   Pulses palpable and equal bilaterally.    Skin:   No bleeding or rash.   Neuro:   Non-focal.  Moves all extremities well.    Psychiatric:   Normal mood and affect.       Lab Review:                Results from last 7 days   Lab Units 10/06/21  0243   SODIUM mmol/L 138   POTASSIUM mmol/L 4.0   CHLORIDE mmol/L 103   CO2 mmol/L 27.1   BUN mg/dL 25*   CREATININE mg/dL 1.10*   GLUCOSE mg/dL 85   CALCIUM mg/dL 8.9     Results from last 7 days   Lab Units 10/06/21  0243 10/05/21  1520   TROPONIN T ng/mL 0.038* 0.045*     Results from last 7 days   Lab Units 10/06/21  0243   WBC 10*3/mm3 6.18   HEMOGLOBIN g/dL 11.6*   HEMATOCRIT % 35.8   PLATELETS 10*3/mm3 171     Results from last 7 days   Lab Units " 10/05/21  1520   INR  1.10         Results from last 7 days   Lab Units 10/06/21  0243   MAGNESIUM mg/dL 2.0           EKG (reviewed by me personally):    Admission EKG 10/05/2021:      05/29/2018:        Assessment:   1.  Right lower extremity DVT  2.  Acute CHF (type unknown)  3.  History of cardiomyopathy with EF 35 to 40% in 2011  4.  Atrial fibrillation (new)  5.  Stage III chronic kidney disease  6.  Hypertension    Plan:       The patient is currently being treated for a DVT with Lovenox.  This will effectively treat the newly diagnosed atrial fibrillation.  I suspect this has been a chronic issue, although she is completely asymptomatic from it.  This can likely be transitioned to Eliquis in the near future.  Her rate is controlled after 1 dose of oral metoprolol 25 mg.  I will wait to see if her heart rate goes up before adding a chronic beta-blocker in a 89-year-old patient.  I agree with diuresis with Lasix 20 mg IV once a day.  She is doing better after starting this.  I do not suspect that the troponin elevation is ischemic.  She has not had any chest discomfort per her account.  Her CT angiogram of the chest did not show pulmonary embolism.  She is not a candidate for an ischemic evaluation given her age.    Thank you very much for this consult.    Geovanni Herrera MD

## 2021-10-06 NOTE — DISCHARGE PLACEMENT REQUEST
"Shira Davila (89 y.o. Female)     Date of Birth Social Security Number Address Home Phone MRN    11/19/1921  [id number] [id number] [phone] [id number]    Anglican Marital Status          Zoroastrian Single       Admission Date Admission Type Admitting Provider Attending Provider Department, Room/Bed    10/5/21 Emergency Gera Rodrigues MD Benton, John B, MD [address], [address]/1    Discharge Date Discharge Disposition Discharge Destination                       Attending Provider: Jose Armando Wayne MD    Allergies: Cephalexin, Atorvastatin Calcium, Sesame Oil, Amoxicillin, Cortisone, Diazepam, Doxycycline, Erythromycin, Horse-derived Products, Levoxyl [Levothyroxine Sodium], Lexapro [Escitalopram Oxalate], Lipitor [Atorvastatin], Lopressor [Metoprolol Tartrate], Metaxalone, Omeprazole, Penicillins, Sesame Seed (Diagnostic), Solarcaine [Benzocaine], Sulfa Antibiotics    Isolation: None   Infection: None   Code Status: CPR    Ht: 162.6 cm (64\")   Wt: 53.7 kg (118 lb 6.2 oz)    Admission Cmt: None   Principal Problem: Acute deep vein thrombosis (DVT) of femoral vein of right lower extremity (HCC) [I82.411]                 Active Insurance as of 10/5/2021     Primary Coverage     Payor Plan Insurance Group Employer/Plan Group    [place] [id number]     Payor Plan Address Payor Plan Phone Number Payor Plan Fax Number Effective Dates    [address]   1/1/1988 - None Entered    [place] [id number]       Subscriber Name Subscriber Birth Date Member ID       SHIRA DAVILA 11/19/1921 [id number]                 Emergency Contacts      (Rel.) Home Phone Work Phone Mobile Phone    SADIE (NEPHEW)DOMINICK (Relative) [phone] -- [phone]    Kylee Rivera (Friend) [phone] -- --    Omar Tavares (Relative) [phone] -- --              "

## 2021-10-07 ENCOUNTER — HOME HEALTH ADMISSION (OUTPATIENT)
Dept: HOME HEALTH SERVICES | Facility: HOME HEALTHCARE | Age: 86
End: 2021-10-07

## 2021-10-07 ENCOUNTER — APPOINTMENT (OUTPATIENT)
Dept: CARDIOLOGY | Facility: HOSPITAL | Age: 86
End: 2021-10-07

## 2021-10-07 PROBLEM — I42.8 NICM (NONISCHEMIC CARDIOMYOPATHY) (HCC): Status: ACTIVE | Noted: 2021-10-07

## 2021-10-07 LAB
25(OH)D3 SERPL-MCNC: 24.9 NG/ML (ref 30–100)
ALBUMIN SERPL-MCNC: 3.4 G/DL (ref 3.5–5.2)
ALBUMIN/GLOB SERPL: 1.7 G/DL
ALP SERPL-CCNC: 65 U/L (ref 39–117)
ALT SERPL W P-5'-P-CCNC: 39 U/L (ref 1–33)
ANION GAP SERPL CALCULATED.3IONS-SCNC: 10.3 MMOL/L (ref 5–15)
AORTIC DIMENSIONLESS INDEX: 0.7 (DI)
AST SERPL-CCNC: 34 U/L (ref 1–32)
BASOPHILS # BLD AUTO: 0.04 10*3/MM3 (ref 0–0.2)
BASOPHILS NFR BLD AUTO: 0.7 % (ref 0–1.5)
BH CV ECHO MEAS - ACS: 1.2 CM
BH CV ECHO MEAS - AO MAX PG (FULL): 1 MMHG
BH CV ECHO MEAS - AO MAX PG: 2.1 MMHG
BH CV ECHO MEAS - AO MEAN PG (FULL): 0 MMHG
BH CV ECHO MEAS - AO MEAN PG: 1 MMHG
BH CV ECHO MEAS - AO ROOT AREA (BSA CORRECTED): 1.8
BH CV ECHO MEAS - AO ROOT AREA: 6.2 CM^2
BH CV ECHO MEAS - AO ROOT DIAM: 2.8 CM
BH CV ECHO MEAS - AO V2 MAX: 72 CM/SEC
BH CV ECHO MEAS - AO V2 MEAN: 51.1 CM/SEC
BH CV ECHO MEAS - AO V2 VTI: 13.8 CM
BH CV ECHO MEAS - ASC AORTA: 2.9 CM
BH CV ECHO MEAS - AVA(I,A): 2.8 CM^2
BH CV ECHO MEAS - AVA(I,D): 2.8 CM^2
BH CV ECHO MEAS - AVA(V,A): 2.7 CM^2
BH CV ECHO MEAS - AVA(V,D): 2.7 CM^2
BH CV ECHO MEAS - BSA(HAYCOCK): 1.6 M^2
BH CV ECHO MEAS - BSA: 1.6 M^2
BH CV ECHO MEAS - BZI_BMI: 21.1 KILOGRAMS/M^2
BH CV ECHO MEAS - BZI_METRIC_HEIGHT: 162.6 CM
BH CV ECHO MEAS - BZI_METRIC_WEIGHT: 55.8 KG
BH CV ECHO MEAS - EDV(CUBED): 157.5 ML
BH CV ECHO MEAS - EDV(MOD-SP2): 95 ML
BH CV ECHO MEAS - EDV(MOD-SP4): 111 ML
BH CV ECHO MEAS - EDV(TEICH): 141.3 ML
BH CV ECHO MEAS - EF(CUBED): 38.2 %
BH CV ECHO MEAS - EF(MOD-BP): 19.1 %
BH CV ECHO MEAS - EF(MOD-SP2): 12.6 %
BH CV ECHO MEAS - EF(MOD-SP4): 26.1 %
BH CV ECHO MEAS - EF(TEICH): 31.1 %
BH CV ECHO MEAS - ESV(CUBED): 97.3 ML
BH CV ECHO MEAS - ESV(MOD-SP2): 83 ML
BH CV ECHO MEAS - ESV(MOD-SP4): 82 ML
BH CV ECHO MEAS - ESV(TEICH): 97.3 ML
BH CV ECHO MEAS - FS: 14.8 %
BH CV ECHO MEAS - IVS/LVPW: 0.9
BH CV ECHO MEAS - IVSD: 0.9 CM
BH CV ECHO MEAS - LAT PEAK E' VEL: 7.4 CM/SEC
BH CV ECHO MEAS - LV DIASTOLIC VOL/BSA (35-75): 69.8 ML/M^2
BH CV ECHO MEAS - LV MASS(C)D: 193.3 GRAMS
BH CV ECHO MEAS - LV MASS(C)DI: 121.5 GRAMS/M^2
BH CV ECHO MEAS - LV MAX PG: 1 MMHG
BH CV ECHO MEAS - LV MEAN PG: 1 MMHG
BH CV ECHO MEAS - LV SYSTOLIC VOL/BSA (12-30): 51.5 ML/M^2
BH CV ECHO MEAS - LV V1 MAX: 51.2 CM/SEC
BH CV ECHO MEAS - LV V1 MEAN: 33.7 CM/SEC
BH CV ECHO MEAS - LV V1 VTI: 10.2 CM
BH CV ECHO MEAS - LVIDD: 5.4 CM
BH CV ECHO MEAS - LVIDS: 4.6 CM
BH CV ECHO MEAS - LVLD AP2: 6.4 CM
BH CV ECHO MEAS - LVLD AP4: 6.5 CM
BH CV ECHO MEAS - LVLS AP2: 6 CM
BH CV ECHO MEAS - LVLS AP4: 6.1 CM
BH CV ECHO MEAS - LVOT AREA (M): 3.8 CM^2
BH CV ECHO MEAS - LVOT AREA: 3.8 CM^2
BH CV ECHO MEAS - LVOT DIAM: 2.2 CM
BH CV ECHO MEAS - LVPWD: 1 CM
BH CV ECHO MEAS - MED PEAK E' VEL: 4.9 CM/SEC
BH CV ECHO MEAS - MV DEC SLOPE: 771 CM/SEC^2
BH CV ECHO MEAS - MV DEC TIME: 170 SEC
BH CV ECHO MEAS - MV E MAX VEL: 96.7 CM/SEC
BH CV ECHO MEAS - MV MAX PG: 6.1 MMHG
BH CV ECHO MEAS - MV MEAN PG: 2 MMHG
BH CV ECHO MEAS - MV P1/2T MAX VEL: 126 CM/SEC
BH CV ECHO MEAS - MV P1/2T: 47.9 MSEC
BH CV ECHO MEAS - MV V2 MAX: 123 CM/SEC
BH CV ECHO MEAS - MV V2 MEAN: 63.2 CM/SEC
BH CV ECHO MEAS - MV V2 VTI: 21.5 CM
BH CV ECHO MEAS - MVA P1/2T LCG: 1.7 CM^2
BH CV ECHO MEAS - MVA(P1/2T): 4.6 CM^2
BH CV ECHO MEAS - MVA(VTI): 1.8 CM^2
BH CV ECHO MEAS - RAP SYSTOLE: 8 MMHG
BH CV ECHO MEAS - RVSP: 33.2 MMHG
BH CV ECHO MEAS - SI(AO): 53.4 ML/M^2
BH CV ECHO MEAS - SI(CUBED): 37.8 ML/M^2
BH CV ECHO MEAS - SI(LVOT): 24.4 ML/M^2
BH CV ECHO MEAS - SI(MOD-SP2): 7.5 ML/M^2
BH CV ECHO MEAS - SI(MOD-SP4): 18.2 ML/M^2
BH CV ECHO MEAS - SI(TEICH): 27.6 ML/M^2
BH CV ECHO MEAS - SV(AO): 85 ML
BH CV ECHO MEAS - SV(CUBED): 60.1 ML
BH CV ECHO MEAS - SV(LVOT): 38.8 ML
BH CV ECHO MEAS - SV(MOD-SP2): 12 ML
BH CV ECHO MEAS - SV(MOD-SP4): 29 ML
BH CV ECHO MEAS - SV(TEICH): 44 ML
BH CV ECHO MEAS - TAPSE (>1.6): 0.9 CM
BH CV ECHO MEAS - TR MAX VEL: 251 CM/SEC
BH CV ECHO MEASUREMENTS AVERAGE E/E' RATIO: 15.72
BH CV VAS BP RIGHT ARM: NORMAL MMHG
BH CV XLRA - RV BASE: 3 CM
BH CV XLRA - RV LENGTH: 7 CM
BH CV XLRA - RV MID: 2.5 CM
BH CV XLRA - TDI S': 9.7 CM/SEC
BILIRUB SERPL-MCNC: 0.5 MG/DL (ref 0–1.2)
BUN SERPL-MCNC: 22 MG/DL (ref 8–23)
BUN/CREAT SERPL: 21.4 (ref 7–25)
CALCIUM SPEC-SCNC: 8.8 MG/DL (ref 8.2–9.6)
CHLORIDE SERPL-SCNC: 101 MMOL/L (ref 98–107)
CO2 SERPL-SCNC: 23.7 MMOL/L (ref 22–29)
CREAT SERPL-MCNC: 1.03 MG/DL (ref 0.57–1)
DEPRECATED RDW RBC AUTO: 44.4 FL (ref 37–54)
EOSINOPHIL # BLD AUTO: 0.19 10*3/MM3 (ref 0–0.4)
EOSINOPHIL NFR BLD AUTO: 3.4 % (ref 0.3–6.2)
ERYTHROCYTE [DISTWIDTH] IN BLOOD BY AUTOMATED COUNT: 13.5 % (ref 12.3–15.4)
FOLATE SERPL-MCNC: 12.4 NG/ML (ref 4.78–24.2)
GFR SERPL CREATININE-BSD FRML MDRD: 49 ML/MIN/1.73
GLOBULIN UR ELPH-MCNC: 2 GM/DL
GLUCOSE SERPL-MCNC: 105 MG/DL (ref 65–99)
HCT VFR BLD AUTO: 38.6 % (ref 34–46.6)
HGB BLD-MCNC: 12.3 G/DL (ref 12–15.9)
IMM GRANULOCYTES # BLD AUTO: 0.02 10*3/MM3 (ref 0–0.05)
IMM GRANULOCYTES NFR BLD AUTO: 0.4 % (ref 0–0.5)
LEFT ATRIUM VOLUME INDEX: 49 ML/M2
LYMPHOCYTES # BLD AUTO: 1.36 10*3/MM3 (ref 0.7–3.1)
LYMPHOCYTES NFR BLD AUTO: 24.3 % (ref 19.6–45.3)
MAGNESIUM SERPL-MCNC: 2.2 MG/DL (ref 1.7–2.3)
MAXIMAL PREDICTED HEART RATE: 121 BPM
MCH RBC QN AUTO: 28.7 PG (ref 26.6–33)
MCHC RBC AUTO-ENTMCNC: 31.9 G/DL (ref 31.5–35.7)
MCV RBC AUTO: 90 FL (ref 79–97)
MONOCYTES # BLD AUTO: 0.59 10*3/MM3 (ref 0.1–0.9)
MONOCYTES NFR BLD AUTO: 10.6 % (ref 5–12)
NEUTROPHILS NFR BLD AUTO: 3.39 10*3/MM3 (ref 1.7–7)
NEUTROPHILS NFR BLD AUTO: 60.6 % (ref 42.7–76)
NRBC BLD AUTO-RTO: 0 /100 WBC (ref 0–0.2)
PLATELET # BLD AUTO: 180 10*3/MM3 (ref 140–450)
PMV BLD AUTO: 11.5 FL (ref 6–12)
POTASSIUM SERPL-SCNC: 4.3 MMOL/L (ref 3.5–5.2)
PROT SERPL-MCNC: 5.4 G/DL (ref 6–8.5)
RBC # BLD AUTO: 4.29 10*6/MM3 (ref 3.77–5.28)
SODIUM SERPL-SCNC: 135 MMOL/L (ref 136–145)
STRESS TARGET HR: 103 BPM
VIT B12 BLD-MCNC: 1887 PG/ML (ref 211–946)
WBC # BLD AUTO: 5.59 10*3/MM3 (ref 3.4–10.8)

## 2021-10-07 PROCEDURE — 82746 ASSAY OF FOLIC ACID SERUM: CPT | Performed by: HOSPITALIST

## 2021-10-07 PROCEDURE — 97530 THERAPEUTIC ACTIVITIES: CPT

## 2021-10-07 PROCEDURE — 25010000002 FUROSEMIDE PER 20 MG

## 2021-10-07 PROCEDURE — 85025 COMPLETE CBC W/AUTO DIFF WBC: CPT | Performed by: HOSPITALIST

## 2021-10-07 PROCEDURE — 93306 TTE W/DOPPLER COMPLETE: CPT

## 2021-10-07 PROCEDURE — 25010000002 ENOXAPARIN PER 10 MG

## 2021-10-07 PROCEDURE — 99232 SBSQ HOSP IP/OBS MODERATE 35: CPT | Performed by: NURSE PRACTITIONER

## 2021-10-07 PROCEDURE — 83735 ASSAY OF MAGNESIUM: CPT | Performed by: INTERNAL MEDICINE

## 2021-10-07 PROCEDURE — 93306 TTE W/DOPPLER COMPLETE: CPT | Performed by: INTERNAL MEDICINE

## 2021-10-07 PROCEDURE — 82607 VITAMIN B-12: CPT | Performed by: HOSPITALIST

## 2021-10-07 PROCEDURE — 80053 COMPREHEN METABOLIC PANEL: CPT | Performed by: HOSPITALIST

## 2021-10-07 PROCEDURE — 82306 VITAMIN D 25 HYDROXY: CPT | Performed by: HOSPITALIST

## 2021-10-07 RX ORDER — FAMOTIDINE 20 MG/1
20 TABLET, FILM COATED ORAL
Status: DISCONTINUED | OUTPATIENT
Start: 2021-10-07 | End: 2021-10-10 | Stop reason: DRUGHIGH

## 2021-10-07 RX ORDER — PAROXETINE 10 MG/1
10 TABLET, FILM COATED ORAL NIGHTLY
Status: DISCONTINUED | OUTPATIENT
Start: 2021-10-07 | End: 2021-10-14 | Stop reason: HOSPADM

## 2021-10-07 RX ORDER — FUROSEMIDE 20 MG/1
20 TABLET ORAL DAILY
Status: DISCONTINUED | OUTPATIENT
Start: 2021-10-08 | End: 2021-10-08

## 2021-10-07 RX ADMIN — CLONAZEPAM 0.5 MG: 0.5 TABLET ORAL at 02:44

## 2021-10-07 RX ADMIN — FUROSEMIDE 20 MG: 20 INJECTION, SOLUTION INTRAMUSCULAR; INTRAVENOUS at 09:08

## 2021-10-07 RX ADMIN — CARBAMIDE PEROXIDE 6.5% 10 DROP: 6.5 LIQUID AURICULAR (OTIC) at 09:09

## 2021-10-07 RX ADMIN — POLYETHYLENE GLYCOL 3350 17 G: 17 POWDER, FOR SOLUTION ORAL at 09:08

## 2021-10-07 RX ADMIN — PAROXETINE HYDROCHLORIDE HEMIHYDRATE 10 MG: 10 TABLET, FILM COATED ORAL at 20:09

## 2021-10-07 RX ADMIN — DOCUSATE SODIUM 50MG AND SENNOSIDES 8.6MG 2 TABLET: 8.6; 5 TABLET, FILM COATED ORAL at 20:09

## 2021-10-07 RX ADMIN — Medication 500 MCG: at 09:08

## 2021-10-07 RX ADMIN — CASTOR OIL AND BALSAM, PERU 1 APPLICATION: 788; 87 OINTMENT TOPICAL at 20:11

## 2021-10-07 RX ADMIN — POTASSIUM CHLORIDE 10 MEQ: 750 TABLET, EXTENDED RELEASE ORAL at 20:09

## 2021-10-07 RX ADMIN — POTASSIUM CHLORIDE 10 MEQ: 750 TABLET, EXTENDED RELEASE ORAL at 09:08

## 2021-10-07 RX ADMIN — ENOXAPARIN SODIUM 60 MG: 60 INJECTION, SOLUTION INTRAVENOUS; SUBCUTANEOUS at 20:09

## 2021-10-07 RX ADMIN — CARBAMIDE PEROXIDE 6.5% 10 DROP: 6.5 LIQUID AURICULAR (OTIC) at 20:09

## 2021-10-07 RX ADMIN — ASPIRIN 81 MG: 81 TABLET, CHEWABLE ORAL at 09:08

## 2021-10-07 RX ADMIN — METOPROLOL SUCCINATE 25 MG: 25 TABLET, EXTENDED RELEASE ORAL at 09:08

## 2021-10-07 RX ADMIN — FAMOTIDINE 20 MG: 20 TABLET, FILM COATED ORAL at 18:05

## 2021-10-07 RX ADMIN — CASTOR OIL AND BALSAM, PERU 1 APPLICATION: 788; 87 OINTMENT TOPICAL at 11:06

## 2021-10-07 RX ADMIN — OXYBUTYNIN CHLORIDE 5 MG: 5 TABLET, EXTENDED RELEASE ORAL at 18:05

## 2021-10-07 NOTE — PLAN OF CARE
Goal Outcome Evaluation:           Progress: no change  Outcome Summary: VSS, no c/o pain, anxiety med started, IV lasix --> PO, up to BSC, echo done, boots on, Q2 turn, cream on bottom several times today, pt refuses mepalex, pure wic in place, will continue to monitor

## 2021-10-07 NOTE — PLAN OF CARE
Problem: Adult Inpatient Plan of Care  Goal: Plan of Care Review  10/7/2021 0327 by Shiela Garsia, RN  Outcome: Ongoing, Progressing  Flowsheets (Taken 10/7/2021 0327)  Progress: improving  Plan of Care Reviewed With: patient  Outcome Summary: VSS. Pt on 2L of O2 per NC at night. Shannan boots and venelax on heels, cream on coccyx and Q2 turns. Pt very anxious last night- Klonopin given per MAR. Will continue to monitor.

## 2021-10-07 NOTE — NURSING NOTE
Wound/ostomy - patient seen in consultation for skin concerns to L elbow. Skin assessed and found to have scattered darkened scars and ecchymotic areas to arms >L. No indications of pressure related injuries to either arm.

## 2021-10-07 NOTE — PROGRESS NOTES
Name: Shira Davila ADMIT: 10/5/2021   : 1921  PCP: Aletha Rincon MD    MRN: [id number] LOS: 2 days   AGE/SEX: 89 y.o. female  ROOM: [facility]/     Subjective   Subjective   Pt mainly concerned that she will be sent to [facility]/SNF at discharge. Denies CP, palp, or SOA today. Feeling better overall.  Had a panic attack last night--felt like her throat was closing up. Resolved with Klonopin.  RLE hurts.  Voiding well. Tolerating diet. Moving bowels now.    Review of Systems   Constitutional: Positive for fatigue. Negative for appetite change, chills, diaphoresis and fever.   HENT: Negative for congestion, facial swelling, nosebleeds, rhinorrhea, sore throat, trouble swallowing and voice change.    Eyes: Negative for pain and visual disturbance.   Respiratory: Negative for cough, chest tightness, shortness of breath, wheezing and stridor.    Cardiovascular: Positive for leg swelling (RLE). Negative for chest pain and palpitations.   Gastrointestinal: Negative for abdominal pain, blood in stool, constipation, diarrhea, nausea and vomiting.   Endocrine: Negative for cold intolerance and heat intolerance.   Genitourinary: Negative for decreased urine volume, difficulty urinating, dysuria and hematuria.   Musculoskeletal: Negative for back pain and neck pain.   Skin: Negative for color change and pallor.   Allergic/Immunologic: Positive for environmental allergies and food allergies.   Neurological: Negative for seizures, syncope, facial asymmetry, speech difficulty and headaches.   Hematological: Negative for adenopathy. Does not bruise/bleed easily.   Psychiatric/Behavioral: Negative for behavioral problems, confusion and hallucinations. The patient is nervous/anxious.         Objective   Objective   Vital Signs  Temp:  [97.3 °F (36.3 °C)-98.1 °F (36.7 °C)] 97.3 °F (36.3 °C)  Heart Rate:  [66-94] 94  Resp:  [16] 16  BP: (113-120)/(63-78) 115/78  SpO2:  [94 %-95 %] 94 %  on  Flow (L/min):  [2] 2;   Device  (Oxygen Therapy): room air  Body mass index is 21.11 kg/m².  Physical Exam  Vitals and nursing note reviewed. Exam conducted with a chaperone present (cousin).   Constitutional:       General: She is not in acute distress.     Appearance: She is ill-appearing (chronically). She is not toxic-appearing or diaphoretic.   HENT:      Head: Normocephalic and atraumatic.      Right Ear: External ear normal.      Left Ear: External ear normal.      Nose: Nose normal.      Mouth/Throat:      Mouth: Mucous membranes are moist.      Pharynx: Oropharynx is clear.      Comments: Poor dentition  Eyes:      General: No scleral icterus.        Right eye: No discharge.         Left eye: No discharge.      Extraocular Movements: Extraocular movements intact.      Conjunctiva/sclera: Conjunctivae normal.   Cardiovascular:      Rate and Rhythm: Normal rate. Rhythm irregular.      Pulses: Normal pulses.      Heart sounds: Murmur heard.     Pulmonary:      Effort: Pulmonary effort is normal. No respiratory distress.      Breath sounds: No wheezing or rales.      Comments: Decreased BS in bases  Abdominal:      General: Bowel sounds are normal. There is no distension.      Palpations: Abdomen is soft.      Tenderness: There is no abdominal tenderness.   Musculoskeletal:         General: Normal range of motion.      Cervical back: Neck supple. No rigidity.      Right lower leg: Edema (2+) present.      Left lower leg: Edema (trace-1+) present.   Lymphadenopathy:      Cervical: No cervical adenopathy.   Skin:     General: Skin is warm and dry.      Capillary Refill: Capillary refill takes less than 2 seconds.      Coloration: Skin is not jaundiced.      Findings: Bruising (shins) present. No rash.      Comments: Venous stasis changes in BLEs   Neurological:      General: No focal deficit present.      Mental Status: She is alert and oriented to person, place, and time. Mental status is at baseline.      Cranial Nerves: No cranial nerve  deficit.      Coordination: Coordination normal.      Comments: [facility]   Psychiatric:         Behavior: Behavior normal.         Thought Content: Thought content normal.      Comments: Anxious, tearful at times         Results Review     I reviewed the patient's new clinical results.  Results from last 7 days   Lab Units 10/07/21  0419 10/06/21  0243 10/05/21  1520   WBC 10*3/mm3 5.59 6.18 6.89   HEMOGLOBIN g/dL 12.3 11.6* 12.2   PLATELETS 10*3/mm3 180 171 184     Results from last 7 days   Lab Units 10/07/21  0419 10/06/21  0243 10/05/21  1520   SODIUM mmol/L 135* 138 139   POTASSIUM mmol/L 4.3 4.0 4.2   CHLORIDE mmol/L 101 103 103   CO2 mmol/L 23.7 27.1 26.1   BUN mg/dL 22 25* 24*   CREATININE mg/dL 1.03* 1.10* 1.25*   GLUCOSE mg/dL 105* 85 107*   Estimated Creatinine Clearance: 26.2 mL/min (A) (by C-G formula based on SCr of 1.03 mg/dL (H)).  Results from last 7 days   Lab Units 10/07/21  0419 10/05/21  1520   ALBUMIN g/dL 3.40* 4.00   BILIRUBIN mg/dL 0.5 0.4   ALK PHOS U/L 65 75   AST (SGOT) U/L 34* 32   ALT (SGPT) U/L 39* 43*     Results from last 7 days   Lab Units 10/07/21  0419 10/06/21  0243 10/05/21  1520   CALCIUM mg/dL 8.8 8.9 9.5   ALBUMIN g/dL 3.40*  --  4.00   MAGNESIUM mg/dL 2.2 2.0 2.1       COVID19   Date Value Ref Range Status   10/05/2021 Not Detected Not Detected - Ref. Range Final     No results found for: HGBA1C, POCGLU    Adult Transthoracic Echo Complete w/ Color, Spectral and Contrast if necessary per protocol  · Calculated left ventricular EF = 19.1% Estimated left ventricular EF was   in agreement with the calculated left ventricular EF. Left ventricular   systolic function is severely decreased with severe global hypokinesis.  · Left ventricular diastolic function is consistent with (grade II w/high   LAP) pseudonormalization. Elevated left atrial filling pressure.  · Moderately reduced right ventricular systolic function noted.  · Severe biatrial enlargement  · There are myxomatous changes  of the mitral valve apparatus present.Mild   mitral valve regurgitation is present  · There is a moderate sized left pleural effusion.  · Atrial fibrillation was the predominant rhythm observed during the   procedure.       Scheduled Medications  aspirin, 81 mg, Oral, Daily  carbamide peroxide, 10 drop, Both Ears, BID  castor oil-balsam peru, 1 application, Topical, Q12H  enoxaparin, 1 mg/kg, Subcutaneous, Q24H  [START ON 10/8/2021] furosemide, 20 mg, Oral, Daily  metoprolol succinate XL, 25 mg, Oral, Q24H  oxybutynin XL, 5 mg, Oral, Daily Before Supper  polyethylene glycol, 17 g, Oral, Daily  potassium chloride, 10 mEq, Oral, BID  senna-docusate sodium, 2 tablet, Oral, Nightly  vitamin B-12, 500 mcg, Oral, Daily    Infusions   Diet  Diet Soft Texture; Ground; Cardiac       Assessment/Plan     Active Hospital Problems    Diagnosis  POA   • **Acute deep vein thrombosis (DVT) of femoral vein of right lower extremity ([facility]) [I82.411]  Yes   • NICM (nonischemic cardiomyopathy) ([facility]) [I42.8]  Yes   • Stage 3a chronic kidney disease ([facility]) [N18.31]  Yes   • Acute on chronic systolic CHF (congestive heart failure) ([facility]) [I50.23]  Yes   • Pleural effusion, bilateral [J90]  Yes   • V-tach ([facility]) [I47.2]  No   • New onset a-fib ([facility]) [I48.91]  Yes   • Bilateral impacted cerumen [H61.23]  Yes   • Coronary artery disease [I25.10]  Yes   • Benign essential HTN [I10]  Yes   • Chronic constipation [K59.09]  Yes      Resolved Hospital Problems   No resolved problems to display.       Pleasant 89yo woman with h/o HTN, CAD, OAB, B12 deficiency, and chronic systolic CHF, who was sent to ER from urgent care with report of 1 week h/o worsening AGUAYO and pain/swelling of RLE. Found to have acute DVT in right femoral vein, acute on chronic systolic CHF, and new-onset AFib.     Acute DVT RLE  H/o LLE DVT per pt many years ago  Continue therapeutic Lovenox  Change to Eliquis at dc    New-onset AFib, 20 beat run of VTach (both asymptomatic)  Low  dose Metoprolol for now, rate controlled  No ischemic w/u per Card  Echo noted  AC in place for DVT already  TSH and Mg++ wnl, K+ wnl  Only had one 3 beat run of VTach overnight    Acute on chronic systolic CHF (BNP 11k on admission) with bilateral pleural effusions, Cardiomyopathy  IV Lasix changed to po today  Monitoring daily weights and I/Os  Echo shows EF 20% (down from 35-40% in 2011)  Continue BB  No room on BP for ACE/ARB or aldactone    CAD with elevated troponin  No chest pain  No ischemic w/u planned per Card    CKD3a  Cr elevated slightly on admission  Now at pt's baseline  Watch closely in face of diuresis    HTN  On no meds PTA  BPs fine here    OAB  Continue Ditropan XL  Voiding fine    Constipation  Reviewed KUB, moderate fecal burden  Started bowel regimen and moving bowels now    Anxiety  Quite distraught here  Continue PRN Klonopin (was on PTA)  Start Paxil--should be safe in setting of heart failure and QTc is fine      · Therapeutic dose of Lovenox should suffice for DVT prophylaxis.  · Full code.  · Discussed with patient, nursing staff, consulting provider, CCP and care team on multidisciplinary rounds, d/w family at bedside.  · Anticipate discharge to SNF, timing yet to be determined.      Jose Armando Wayne MD  [facility]  10/07/21  15:17 EDT

## 2021-10-07 NOTE — THERAPY TREATMENT NOTE
Patient Name: Shira Davila  : 1921    MRN: [id number]                              Today's Date: 10/7/2021       Admit Date: 10/5/2021    Visit Dx:     ICD-10-CM ICD-9-CM   1. Acute congestive heart failure, unspecified heart failure type ([facility])  I50.9 428.0   2. Atrial fibrillation with rapid ventricular response ([facility])  I48.91 427.31   3. DVT, lower extremity, proximal, acute, right ([facility])  I82.4Y1 453.41     Patient Active Problem List   Diagnosis   • Abdominal pain, vomiting, and diarrhea   • Chronic constipation   • Hemorrhoid   • Arthralgia of hip   • LBP (low back pain)   • Disease of thyroid gland   • Benign essential HTN   • Chronic fatigue   • Chronic systolic CHF (congestive heart failure) ([facility])   • Coronary artery disease   • Colitis   • Bilateral impacted cerumen   • Anxiety   • Fungal infection   • Poor social situation   • Leg pain, anterior, right   • Poor mobility   • Weight loss   • Bilateral leg edema   • Stage 3a chronic kidney disease ([facility])   • Acute deep vein thrombosis (DVT) of femoral vein of right lower extremity ([facility])   • Acute on chronic systolic CHF (congestive heart failure) ([facility])   • Pleural effusion, bilateral   • V-tach ([facility])   • New onset a-fib ([facility])   • NICM (nonischemic cardiomyopathy) ([facility])     Past Medical History:   Diagnosis Date   • Anxiety    • Arthritis    • CHF (congestive heart failure) ([facility])    • Coronary artery disease    • Disease of thyroid gland 3/16/2016   • Diverticulitis    • Hypertension      Past Surgical History:   Procedure Laterality Date   • ABDOMINAL SURGERY      liban   • APPENDECTOMY     • COLON SURGERY      fistulectomy   • EYE SURGERY      cataract   • HYSTERECTOMY     • TONSILLECTOMY       General Information     Row Name 10/07/21 1548          Physical Therapy Time and Intention    Document Type  therapy note (daily note)  (Pended)   -CS     Mode of Treatment  individual therapy;physical therapy  (Pended)   -CS     Row Name 10/07/21 1548           General Information    Patient Profile Reviewed  yes  (Pended)   -CS     Existing Precautions/Restrictions  fall  (Pended)   -CS     Barriers to Rehab  hearing deficit  (Pended)   -CS     Row Name 10/07/21 1548          Cognition    Orientation Status (Cognition)  oriented x 3  (Pended)   -CS     Row Name 10/07/21 1548          Safety Issues, Functional Mobility    Impairments Affecting Function (Mobility)  balance;endurance/activity tolerance;cognition;pain;strength  (Pended)   -CS       User Key  (r) = Recorded By, (t) = Taken By, (c) = Cosigned By    Initials Name Provider Type    CS Aimee Peña, PT Student PT Student        Mobility     Row Name 10/07/21 1549          Bed Mobility    Bed Mobility  supine-sit;sit-supine  (Pended)   -CS     Supine-Sit [place] (Bed Mobility)  standby assist;verbal cues  (Pended)   -CS     Sit-Supine [place] (Bed Mobility)  standby assist;verbal cues  (Pended)   -CS     Assistive Device (Bed Mobility)  head of bed elevated;bed rails  (Pended)   -CS     Row Name 10/07/21 1549          Transfers    Comment (Transfers)  STS x 5  (Pended)   -CS     Row Name 10/07/21 1549          Sit-Stand Transfer    Sit-Stand [place] (Transfers)  minimum assist (75% patient effort);verbal cues  (Pended)   -CS     Assistive Device (Sit-Stand Transfers)  other (see comments)  (Pended)  HHA  -CS     Row Name 10/07/21 1549          Gait/Stairs (Locomotion)    [place] Level (Gait)  contact guard;minimum assist (75% patient effort);verbal cues  (Pended)   -CS     Assistive Device (Gait)  other (see comments)  (Pended)  HHA  -CS     Distance in Feet (Gait)  20' x 2  (Pended)   -CS     Deviations/Abnormal Patterns (Gait)  joselyn decreased;gait speed decreased;stride length decreased  (Pended)   -CS     Bilateral Gait Deviations  forward flexed posture  (Pended)   -CS       User Key  (r) = Recorded By, (t) = Taken By, (c) = Cosigned By    Initials Name Provider Type    FAVIOLA Peña  Aimee, PT Student PT Student        Obj/Interventions     Row Name 10/07/21 1550          Balance    Balance Assessment  standing static balance;standing dynamic balance  (Pended)   -CS     Static Standing Balance  mild impairment;supported  (Pended)   -CS     Dynamic Standing Balance  mild impairment;supported  (Pended)   -CS       User Key  (r) = Recorded By, (t) = Taken By, (c) = Cosigned By    Initials Name Provider Type    CS Aimee Peña, PT Student PT Student        Goals/Plan    No documentation.       Clinical Impression     Row Name 10/07/21 1550          Pain    Additional Documentation  Pain Scale: FACES Pre/Post-Treatment (Group)  (Pended)   -     Row Name 10/07/21 1550          Pain Scale: FACES Pre/Post-Treatment    Pain: FACES Scale, Pretreatment  0-->no hurt  (Pended)   -CS     Posttreatment Pain Rating  0-->no hurt  (Pended)   -CS     Row Name 10/07/21 3940          Plan of Care Review    Plan of Care Reviewed With  patient  (Pended)   -CS     Progress  improving  (Pended)   -CS     Outcome Summary  Pt received in bed upon PT arrival with family at bedside. Pt was very adamant about telling us her medical hx. Pt completed bed mobility tasks with SBA. Pt stood and ambulated to the bathroom c HHA requiring CGA - min A x 2. Pt had difficulty stimulating a BM but was able to use the bathroom. Pt stood several times for toilet hygiene to be performed. Pt was assisted back to bed where PT assisted with grooming. Pt was very appreciative for our help. Nsg notified. PT will continue to follow to address strength, mobility, and gait.  (Pended)   -     Row Name 10/07/21 1550          Positioning and Restraints    Pre-Treatment Position  in bed  (Pended)   -CS     Post Treatment Position  bed  (Pended)   -CS     In Bed  notified nsg;supine;call light within reach;encouraged to call for assist;exit alarm on  (Pended)   -CS       User Key  (r) = Recorded By, (t) = Taken By, (c) = Cosigned By    Initials  Name Provider Type    CS Aimee Peña, PT Student PT Student        Outcome Measures     Row Name 10/07/21 1557          How much help from another person do you currently need...    Turning from your back to your side while in flat bed without using bedrails?  3  (Pended)   -CS     Moving from lying on back to sitting on the side of a flat bed without bedrails?  3  (Pended)   -CS     Moving to and from a bed to a chair (including a wheelchair)?  3  (Pended)   -CS     Standing up from a chair using your arms (e.g., wheelchair, bedside chair)?  4  (Pended)   -CS     Climbing 3-5 steps with a railing?  2  (Pended)   -CS     To walk in hospital room?  3  (Pended)   -CS     AM-PAC 6 Clicks Score (PT)  18  (Pended)   -CS     Row Name 10/07/21 1557          Functional Assessment    Outcome Measure Options  AM-PAC 6 Clicks Basic Mobility (PT)  (Pended)   -CS       User Key  (r) = Recorded By, (t) = Taken By, (c) = Cosigned By    Initials Name Provider Type    CS Aimee Peña, PT Student PT Student                       Physical Therapy Education                 Title: PT OT SLP Therapies (In Progress)     Topic: Physical Therapy (In Progress)     Point: Mobility training (In Progress)     Learning Progress Summary           Patient Acceptance, E,TB, NR by  at 10/7/2021 1557    Acceptance, E, NR by  at 10/6/2021 1512                   Point: Home exercise program (Not Started)     Learner Progress:  Not documented in this visit.          Point: Body mechanics (In Progress)     Learning Progress Summary           Patient Acceptance, E,TB, NR by  at 10/7/2021 1557                   Point: Precautions (Not Started)     Learner Progress:  Not documented in this visit.                      User Key     Initials Effective Dates Name Provider Type Discipline    CF 06/16/21 -  Vidya Martinez, PT Physical Therapist PT    CS 08/30/21 -  Aimee Peañ, PT Student PT Student PT              PT Recommendation and Plan      Plan of Care Reviewed With: (P) patient  Progress: (P) improving  Outcome Summary: (P) Pt received in bed upon PT arrival with family at bedside. Pt was very adamant about telling us her medical hx. Pt completed bed mobility tasks with SBA. Pt stood and ambulated to the bathroom c HHA requiring CGA - min A x 2. Pt had difficulty stimulating a BM but was able to use the bathroom. Pt stood several times for toilet hygiene to be performed. Pt was assisted back to bed where PT assisted with grooming. Pt was very appreciative for our help. Nsg notified. PT will continue to follow to address strength, mobility, and gait.     Time Calculation:   PT Charges     Row Name 10/07/21 1559             Time Calculation    Start Time  1450  (Pended)   -CS      Stop Time  1523  (Pended)   -CS      Time Calculation (min)  33 min  (Pended)   -CS      PT Received On  10/07/21  (Pended)   -CS      PT - Next Appointment  10/14/21  (Pended)   -CS         Time Calculation- PT    Total Timed Code Minutes- PT  30 minute(s)  (Pended)   -CS         Timed Charges    20810 - PT Therapeutic Activity Minutes  30  (Pended)   -CS         Total Minutes    Timed Charges Total Minutes  30  (Pended)   -CS       Total Minutes  30  (Pended)   -CS        User Key  (r) = Recorded By, (t) = Taken By, (c) = Cosigned By    Initials Name Provider Type    CS Aimee Peña, PT Student PT Student        Therapy Charges for Today     Code Description Service Date Service Provider Modifiers Qty    [id number]  PT THERAPEUTIC ACT EA 15 MIN 10/7/2021 Aimee Peña, PT Student GP 2          PT G-Codes  Outcome Measure Options: (P) AM-PAC 6 Clicks Basic Mobility (PT)  AM-PAC 6 Clicks Score (PT): (P) 18  AM-PAC 6 Clicks Score (OT): 16    Aimee Peña PT Student  10/7/2021

## 2021-10-07 NOTE — CASE MANAGEMENT/SOCIAL WORK
Continued Stay Note  [facility]     Patient Name: Shira Davila  MRN: [id number]  Today's Date: 10/7/2021    Admit Date: 10/5/2021    Discharge Plan     Row Name 10/07/21 1444       Plan    Plan  SNF (referrals pending) vs home with HH and continue services with [facility].Enrolled in meds to bed for new start of Eliquis.    Patient/Family in Agreement with Plan  yes    Plan Comments  PT recommending d/c to SNF as pt does not seem safe to return home alone. Discussed with pt that she cannot go to [place] [facility] at D/C. Pt crying stating she does not want to go to a nursing home. [facility] has accepted in Epic. Discussed with pt that she needs to go to SNF prior to D/C. Conner Michel RN-BC        Discharge Codes    No documentation.       Expected Discharge Date and Time     Expected Discharge Date Expected Discharge Time    Oct 8, 2021             Conner Michel RN

## 2021-10-07 NOTE — PLAN OF CARE
Goal Outcome Evaluation:  Plan of Care Reviewed With: (P) patient        Progress: (P) improving  Outcome Summary: (P) Pt received in bed upon PT arrival with family at bedside. Pt was very adamant about telling us her medical hx. Pt completed bed mobility tasks with SBA. Pt stood and ambulated to the bathroom c HHA requiring CGA - min A x 2. Pt had difficulty stimulating a BM but was able to use the bathroom. Pt stood several times for toilet hygiene to be performed. Pt was assisted back to bed where PT assisted with grooming. Pt was very appreciative for our help. Nsg notified. PT will continue to follow to address strength, mobility, and gait.

## 2021-10-07 NOTE — PROGRESS NOTES
"    Patient Name: Shira Davila  :1921  89 y.o.      Patient Care Team:  Aletha Rincon MD as PCP - General (Family Medicine)    Chief Complaint: Follow up heart failure, atrial fibrillation    Interval History: she tells me she has had heart failure for a long time and has chronic orthopnea. She feels a little better. She has a lot of worries. Her HR has improved.        Objective   Vital Signs  Temp:  [97.3 °F (36.3 °C)-98.1 °F (36.7 °C)] 97.3 °F (36.3 °C)  Heart Rate:  [66-94] 94  Resp:  [16] 16  BP: (113-120)/(63-78) 115/78    Intake/Output Summary (Last 24 hours) at 10/7/2021 1505  Last data filed at 10/7/2021 0806  Gross per 24 hour   Intake 120 ml   Output 600 ml   Net -480 ml     Flowsheet Rows      First Filed Value   Admission Height  162.6 cm (64\") Documented at 10/05/2021 2056   Admission Weight  58.5 kg (129 lb) Documented at 10/05/2021 2118          Physical Exam:   General Appearance:    Alert, cooperative, in no acute distress   Lungs:     Clear to auscultation.  Normal respiratory effort and rate.      Heart:    Regular rhythm and normal rate, normal S1 and S2, no murmurs, gallops or rubs.     Chest Wall:    No abnormalities observed   Abdomen:     Soft, nontender, positive bowel sounds.     Extremities:   no cyanosis, clubbing or edema.  No marked joint deformities.  Adequate musculoskeletal strength.       Results Review:    Results from last 7 days   Lab Units 10/07/21  0419   SODIUM mmol/L 135*   POTASSIUM mmol/L 4.3   CHLORIDE mmol/L 101   CO2 mmol/L 23.7   BUN mg/dL 22   CREATININE mg/dL 1.03*   GLUCOSE mg/dL 105*   CALCIUM mg/dL 8.8     Results from last 7 days   Lab Units 10/06/21  0243 10/05/21  1520   TROPONIN T ng/mL 0.038* 0.045*     Results from last 7 days   Lab Units 10/07/21  0419   WBC 10*3/mm3 5.59   HEMOGLOBIN g/dL 12.3   HEMATOCRIT % 38.6   PLATELETS 10*3/mm3 180     Results from last 7 days   Lab Units 10/05/21  1520   INR  1.10     Results from last 7 days   Lab " Units 10/07/21  0419   MAGNESIUM mg/dL 2.2                   Medication Review:   aspirin, 81 mg, Oral, Daily  carbamide peroxide, 10 drop, Both Ears, BID  castor oil-balsam peru, 1 application, Topical, Q12H  enoxaparin, 1 mg/kg, Subcutaneous, Q24H  furosemide, 20 mg, Intravenous, Daily  metoprolol succinate XL, 25 mg, Oral, Q24H  oxybutynin XL, 5 mg, Oral, Daily Before Supper  polyethylene glycol, 17 g, Oral, Daily  potassium chloride, 10 mEq, Oral, BID  senna-docusate sodium, 2 tablet, Oral, Nightly  vitamin B-12, 500 mcg, Oral, Daily              Assessment/Plan   1.  Right lower extremity DVT  2.  Acute CHF with reduced LV function. EF now 20% with severe global hypokinesis.   3.  History of cardiomyopathy with EF 35 to 40% in 2011  4.  Atrial fibrillation (new)  5.  Stage III chronic kidney disease  6.  Hypertension  7. Nonsustained ventricular tachycardia  8. Minimal pleural effusions    - continue metoprolol succinate. HR well controlled and blood pressure tolerating.   - no room on BP for afterload reduction. She is extremely frail - avoid hypotension.  - change to oral lasix.   - plans to change to apixaban at [place]. Currently on therapeutic lovenox for acute DVT and AF.    FRANCISCO Sumner  [facility]  10/07/21  15:05 EDT

## 2021-10-08 LAB
ALBUMIN SERPL-MCNC: 3.4 G/DL (ref 3.5–5.2)
ALBUMIN/GLOB SERPL: 1.5 G/DL
ALP SERPL-CCNC: 64 U/L (ref 39–117)
ALT SERPL W P-5'-P-CCNC: 31 U/L (ref 1–33)
ANION GAP SERPL CALCULATED.3IONS-SCNC: 9 MMOL/L (ref 5–15)
AST SERPL-CCNC: 24 U/L (ref 1–32)
BASOPHILS # BLD AUTO: 0.05 10*3/MM3 (ref 0–0.2)
BASOPHILS NFR BLD AUTO: 0.8 % (ref 0–1.5)
BILIRUB SERPL-MCNC: 0.3 MG/DL (ref 0–1.2)
BUN SERPL-MCNC: 23 MG/DL (ref 8–23)
BUN/CREAT SERPL: 19.2 (ref 7–25)
CALCIUM SPEC-SCNC: 9 MG/DL (ref 8.2–9.6)
CHLORIDE SERPL-SCNC: 101 MMOL/L (ref 98–107)
CO2 SERPL-SCNC: 26 MMOL/L (ref 22–29)
CREAT SERPL-MCNC: 1.2 MG/DL (ref 0.57–1)
DEPRECATED RDW RBC AUTO: 41.6 FL (ref 37–54)
EOSINOPHIL # BLD AUTO: 0.16 10*3/MM3 (ref 0–0.4)
EOSINOPHIL NFR BLD AUTO: 2.6 % (ref 0.3–6.2)
ERYTHROCYTE [DISTWIDTH] IN BLOOD BY AUTOMATED COUNT: 13.3 % (ref 12.3–15.4)
GFR SERPL CREATININE-BSD FRML MDRD: 41 ML/MIN/1.73
GLOBULIN UR ELPH-MCNC: 2.2 GM/DL
GLUCOSE SERPL-MCNC: 86 MG/DL (ref 65–99)
HCT VFR BLD AUTO: 37.2 % (ref 34–46.6)
HGB BLD-MCNC: 12.4 G/DL (ref 12–15.9)
IMM GRANULOCYTES # BLD AUTO: 0.01 10*3/MM3 (ref 0–0.05)
IMM GRANULOCYTES NFR BLD AUTO: 0.2 % (ref 0–0.5)
LYMPHOCYTES # BLD AUTO: 1.61 10*3/MM3 (ref 0.7–3.1)
LYMPHOCYTES NFR BLD AUTO: 26.3 % (ref 19.6–45.3)
MAGNESIUM SERPL-MCNC: 2.1 MG/DL (ref 1.7–2.3)
MCH RBC QN AUTO: 28.8 PG (ref 26.6–33)
MCHC RBC AUTO-ENTMCNC: 33.3 G/DL (ref 31.5–35.7)
MCV RBC AUTO: 86.3 FL (ref 79–97)
MONOCYTES # BLD AUTO: 0.71 10*3/MM3 (ref 0.1–0.9)
MONOCYTES NFR BLD AUTO: 11.6 % (ref 5–12)
NEUTROPHILS NFR BLD AUTO: 3.59 10*3/MM3 (ref 1.7–7)
NEUTROPHILS NFR BLD AUTO: 58.5 % (ref 42.7–76)
NRBC BLD AUTO-RTO: 0 /100 WBC (ref 0–0.2)
PLATELET # BLD AUTO: 199 10*3/MM3 (ref 140–450)
PMV BLD AUTO: 11.5 FL (ref 6–12)
POTASSIUM SERPL-SCNC: 4.5 MMOL/L (ref 3.5–5.2)
PROT SERPL-MCNC: 5.6 G/DL (ref 6–8.5)
RBC # BLD AUTO: 4.31 10*6/MM3 (ref 3.77–5.28)
SARS-COV-2 ORF1AB RESP QL NAA+PROBE: NOT DETECTED
SODIUM SERPL-SCNC: 136 MMOL/L (ref 136–145)
WBC # BLD AUTO: 6.13 10*3/MM3 (ref 3.4–10.8)

## 2021-10-08 PROCEDURE — U0004 COV-19 TEST NON-CDC HGH THRU: HCPCS | Performed by: INTERNAL MEDICINE

## 2021-10-08 PROCEDURE — 85025 COMPLETE CBC W/AUTO DIFF WBC: CPT | Performed by: HOSPITALIST

## 2021-10-08 PROCEDURE — 99231 SBSQ HOSP IP/OBS SF/LOW 25: CPT | Performed by: NURSE PRACTITIONER

## 2021-10-08 PROCEDURE — 83735 ASSAY OF MAGNESIUM: CPT | Performed by: HOSPITALIST

## 2021-10-08 PROCEDURE — 80053 COMPREHEN METABOLIC PANEL: CPT | Performed by: HOSPITALIST

## 2021-10-08 RX ORDER — FUROSEMIDE 20 MG/1
20 TABLET ORAL DAILY
Status: DISCONTINUED | OUTPATIENT
Start: 2021-10-09 | End: 2021-10-11

## 2021-10-08 RX ORDER — CLONAZEPAM 0.5 MG/1
0.5 TABLET ORAL 2 TIMES DAILY PRN
Status: DISCONTINUED | OUTPATIENT
Start: 2021-10-08 | End: 2021-10-14 | Stop reason: HOSPADM

## 2021-10-08 RX ADMIN — POLYETHYLENE GLYCOL 3350 17 G: 17 POWDER, FOR SOLUTION ORAL at 09:23

## 2021-10-08 RX ADMIN — FAMOTIDINE 20 MG: 20 TABLET, FILM COATED ORAL at 09:22

## 2021-10-08 RX ADMIN — FAMOTIDINE 20 MG: 20 TABLET, FILM COATED ORAL at 18:25

## 2021-10-08 RX ADMIN — CARBAMIDE PEROXIDE 6.5% 10 DROP: 6.5 LIQUID AURICULAR (OTIC) at 09:24

## 2021-10-08 RX ADMIN — ASPIRIN 81 MG: 81 TABLET, CHEWABLE ORAL at 09:21

## 2021-10-08 RX ADMIN — Medication 3 MG: at 20:22

## 2021-10-08 RX ADMIN — PAROXETINE HYDROCHLORIDE HEMIHYDRATE 10 MG: 10 TABLET, FILM COATED ORAL at 20:19

## 2021-10-08 RX ADMIN — OXYBUTYNIN CHLORIDE 5 MG: 5 TABLET, EXTENDED RELEASE ORAL at 18:25

## 2021-10-08 RX ADMIN — SODIUM CHLORIDE, PRESERVATIVE FREE 10 ML: 5 INJECTION INTRAVENOUS at 20:19

## 2021-10-08 RX ADMIN — CLONAZEPAM 0.5 MG: 0.5 TABLET ORAL at 03:40

## 2021-10-08 RX ADMIN — DOCUSATE SODIUM 50MG AND SENNOSIDES 8.6MG 2 TABLET: 8.6; 5 TABLET, FILM COATED ORAL at 20:19

## 2021-10-08 RX ADMIN — CASTOR OIL AND BALSAM, PERU 1 APPLICATION: 788; 87 OINTMENT TOPICAL at 20:20

## 2021-10-08 RX ADMIN — CARBAMIDE PEROXIDE 6.5% 10 DROP: 6.5 LIQUID AURICULAR (OTIC) at 20:19

## 2021-10-08 RX ADMIN — METOPROLOL SUCCINATE 25 MG: 25 TABLET, EXTENDED RELEASE ORAL at 09:22

## 2021-10-08 RX ADMIN — APIXABAN 2.5 MG: 2.5 TABLET, FILM COATED ORAL at 11:48

## 2021-10-08 RX ADMIN — CASTOR OIL AND BALSAM, PERU 1 APPLICATION: 788; 87 OINTMENT TOPICAL at 11:48

## 2021-10-08 RX ADMIN — POTASSIUM CHLORIDE 10 MEQ: 750 TABLET, EXTENDED RELEASE ORAL at 20:19

## 2021-10-08 RX ADMIN — POTASSIUM CHLORIDE 10 MEQ: 750 TABLET, EXTENDED RELEASE ORAL at 09:23

## 2021-10-08 RX ADMIN — Medication 500 MCG: at 09:22

## 2021-10-08 NOTE — CASE MANAGEMENT/SOCIAL WORK
Continued Stay Note  [facility]     Patient Name: Shira Davila  MRN: [id number]  Today's Date: 10/8/2021    Admit Date: 10/5/2021    Discharge Plan     Row Name 10/08/21 1425       Plan    Plan  [facility] Will need transport setup. Ready for D/C. Per Eve Call her Sat/Sun to check bed availability. [phone]    Patient/Family in Agreement with Plan  yes    Plan Comments  [facility] can accept pending bed availability. Spoke with [facility] and have no beds available today. [facility] stated to have weekend CCP call her [phone] to check bed availability. Left weekend CCP note regarding this in weekend to do. Notified Dr Harden of plan. Conner Michel RN-BC        Discharge Codes    No documentation.       Expected Discharge Date and Time     Expected Discharge Date Expected Discharge Time    Oct 10, 2021             Conner Michel RN

## 2021-10-08 NOTE — PLAN OF CARE
Problem: Adult Inpatient Plan of Care  Goal: Plan of Care Review  Outcome: Ongoing, Progressing  Flowsheets (Taken 10/8/2021 1729)  Progress: no change  Plan of Care Reviewed With: patient  Outcome Summary: VSS. No c/o pain. Tearful at times. Shannan boots on and wedge used for turns. Protective cream applied to coccyx. OOB to bathroom x2, +BM. Switched over to eliquis. Discharge planning. Will continue to monitor.

## 2021-10-08 NOTE — PROGRESS NOTES
Name: Shira Davila ADMIT: 10/5/2021   : 1921  PCP: Aletha Rincon MD    MRN: [id number] LOS: 3 days   AGE/SEX: 89 y.o. female  ROOM: [facility]     Subjective   Subjective   Complaining of weakness and anxiety.  No chest pain.  No palpitation.  No shortness of breath.  No PND.  No cough.  No wheeze.  No hemoptysis.  No lower extremity edema.  No fever or chills.    Review of Systems  GI.  No abdominal pain or nausea or vomiting.  Resolved constipation with normal bowel movement and no blood per rectum or melena  .  No dysuria or hematuria.     Objective   Objective   Vital Signs  Temp:  [97.4 °F (36.3 °C)-98 °F (36.7 °C)] 97.4 °F (36.3 °C)  Heart Rate:  [] 100  Resp:  [16] 16  BP: ()/(60-94) 108/94     on   ;   Device (Oxygen Therapy): room air    Intake/Output Summary (Last 24 hours) at 10/8/2021 0940  Last data filed at 10/8/2021 0830  Gross per 24 hour   Intake 360 ml   Output --   Net 360 ml     Body mass index is 21.19 kg/m².      10/07/21  0630 10/07/21  1006 10/08/21  0500   Weight: 56 kg (123 lb 7.3 oz) 55.8 kg (123 lb) 56 kg (123 lb 7.3 oz)     Physical Exam  General.  Elderly female.  Alert and oriented x3.  Anxious and depressed.  In no respiratory distress.  Eyes.  Pupils equal round and reactive.  Intact extraocular musculature.  No pallor or jaundice.  Normal conjunctive and lids.  Oral cavity.  Moist mucous membrane.  Neck.  Supple.  No JVD.  No lymphadenopathy or thyromegaly.  Cardiovascular.  Controlled rate atrial fibrillation with grade 2 systolic murmur.  Chest.  Clear to auscultation bilaterally with no added sounds.  Abdomen.  Soft lax.  No tenderness.  No organomegaly.  No guarding or rebound.  Extremities.  No clubbing cyanosis or edema.  CNS.  No acute focal neurological deficits.    Results Review:      Results from last 7 days   Lab Units 10/08/21  0402 10/07/21  0419 10/06/21  0243 10/05/21  1520   SODIUM mmol/L 136 135* 138 139   POTASSIUM mmol/L 4.5 4.3 4.0  4.2   CHLORIDE mmol/L 101 101 103 103   CO2 mmol/L 26.0 23.7 27.1 26.1   BUN mg/dL 23 22 25* 24*   CREATININE mg/dL 1.20* 1.03* 1.10* 1.25*   GLUCOSE mg/dL 86 105* 85 107*   CALCIUM mg/dL 9.0 8.8 8.9 9.5   AST (SGOT) U/L 24 34*  --  32   ALT (SGPT) U/L 31 39*  --  43*     Estimated Creatinine Clearance: 22.6 mL/min (A) (by C-G formula based on SCr of 1.2 mg/dL (H)).          Results from last 7 days   Lab Units 10/06/21  0243 10/05/21  1520   TROPONIN T ng/mL 0.038* 0.045*     Results from last 7 days   Lab Units 10/05/21  1520   PROBNP pg/mL 11,205.0*     Results from last 7 days   Lab Units 10/06/21  0243   TSH uIU/mL 3.320     Results from last 7 days   Lab Units 10/08/21  0402 10/07/21  0419 10/06/21  0243 10/05/21  1520   MAGNESIUM mg/dL 2.1 2.2 2.0 2.1           Invalid input(s): LDLCALC  Results from last 7 days   Lab Units 10/08/21  0402 10/07/21  0419 10/07/21  0419 10/06/21  0243 10/05/21  1520 10/05/21  1520   WBC 10*3/mm3 6.13  --  5.59 6.18  --  6.89   HEMOGLOBIN g/dL 12.4  --  12.3 11.6*  --  12.2   HEMATOCRIT % 37.2  --  38.6 35.8  --  37.0   PLATELETS 10*3/mm3 199  --  180 171  --  184   MCV fL 86.3   < > 90.0 88.4   < > 87.7   MCH pg 28.8   < > 28.7 28.6   < > 28.9   MCHC g/dL 33.3   < > 31.9 32.4   < > 33.0   RDW % 13.3   < > 13.5 13.4   < > 13.3   RDW-SD fl 41.6   < > 44.4 43.7   < > 42.6   MPV fL 11.5   < > 11.5 11.2   < > 11.0   NEUTROPHIL % % 58.5   < > 60.6  --    < > 72.3   LYMPHOCYTE % % 26.3   < > 24.3  --    < > 17.1*   MONOCYTES % % 11.6   < > 10.6  --    < > 9.3   EOSINOPHIL % % 2.6   < > 3.4  --    < > 0.6   BASOPHIL % % 0.8   < > 0.7  --    < > 0.4   IMM GRAN % % 0.2   < > 0.4  --    < > 0.3   NEUTROS ABS 10*3/mm3 3.59   < > 3.39  --    < > 4.98   LYMPHS ABS 10*3/mm3 1.61   < > 1.36  --    < > 1.18   MONOS ABS 10*3/mm3 0.71   < > 0.59  --    < > 0.64   EOS ABS 10*3/mm3 0.16   < > 0.19  --    < > 0.04   BASOS ABS 10*3/mm3 0.05   < > 0.04  --    < > 0.03   IMMATURE GRANS (ABS) 10*3/mm3  0.01   < > 0.02  --    < > 0.02   NRBC /100 WBC 0.0   < > 0.0  --    < > 0.0    < > = values in this interval not displayed.     Results from last 7 days   Lab Units 10/05/21  1520   INR  1.10                                       Imaging:  Imaging Results (Last 24 Hours)     ** No results found for the last 24 hours. **             I reviewed the patient's new clinical results / labs / tests / procedures      Assessment/Plan     Active Hospital Problems    Diagnosis  POA   • **Acute deep vein thrombosis (DVT) of femoral vein of right lower extremity ([facility]) [I82.411]  Yes   • NICM (nonischemic cardiomyopathy) ([facility]) [I42.8]  Yes   • Stage 3a chronic kidney disease ([facility]) [N18.31]  Yes   • Acute on chronic systolic CHF (congestive heart failure) ([facility]) [I50.23]  Yes   • Pleural effusion, bilateral [J90]  Yes   • V-tach ([facility]) [I47.2]  No   • New onset a-fib ([facility]) [I48.91]  Yes   • Bilateral impacted cerumen [H61.23]  Yes   • Coronary artery disease [I25.10]  Yes   • Benign essential HTN [I10]  Yes   • Chronic constipation [K59.09]  Yes      Resolved Hospital Problems   No resolved problems to display.           · Right lower extremity DVT.  No clinical evidence of PE.  Negative CT a of the chest for PE.  Hemodynamically stable.  Will switch from Lovenox to Eliquis.  · Acute on chronic systolic congestive heart failure/new onset atrial fibrillation/nonsustained V. tach.  In a patient with a history of coronary artery disease, hypertension.  Patient asymptomatic.  No evidence of active angina or congestive heart failure clinically.  Good blood pressure control.  Currently on aspirin/Toprol/Lasix.  Will switch from Lovenox to Eliquis.  Blood pressure does not tolerate ACE inhibitors.  Via monitor still with nonsustained runs of V. tach that are asymptomatic.  Cardiology is not planning procedures or intervention.  · Anxiety/panic disorder.  Continue as needed Klonopin and continue Paxil.  Klonopin will be increased.  · Stage  IIIa chronic renal failure.  BUN and creatinine are stable around the baseline..  Patient is euvolemic.  Continue to monitor.  · Constipation.  Resolved.  Continue home bowel regimen.  · Overactive bladder.  Continue Ditropan.      · Discussed with patient/nurse.  · Disposition.  To skilled facility once bed is available.  Not safe to be discharged home per PT.      Sabi Harden MD  [facility]  10/08/21  09:40 EDT

## 2021-10-08 NOTE — PLAN OF CARE
Goal Outcome Evaluation:              Outcome Summary: VSS. denies pain, PRN anxiety medication, pt refuses to wear heel boots, pt also refusing to mepalex. pur wick in place. Will continue to monitor

## 2021-10-09 LAB
ANION GAP SERPL CALCULATED.3IONS-SCNC: 8.5 MMOL/L (ref 5–15)
BASOPHILS # BLD AUTO: 0.05 10*3/MM3 (ref 0–0.2)
BASOPHILS NFR BLD AUTO: 0.7 % (ref 0–1.5)
BUN SERPL-MCNC: 18 MG/DL (ref 8–23)
BUN/CREAT SERPL: 22.5 (ref 7–25)
CALCIUM SPEC-SCNC: 8.8 MG/DL (ref 8.2–9.6)
CHLORIDE SERPL-SCNC: 101 MMOL/L (ref 98–107)
CO2 SERPL-SCNC: 22.5 MMOL/L (ref 22–29)
CREAT SERPL-MCNC: 0.8 MG/DL (ref 0.57–1)
DEPRECATED RDW RBC AUTO: 42 FL (ref 37–54)
EOSINOPHIL # BLD AUTO: 0.21 10*3/MM3 (ref 0–0.4)
EOSINOPHIL NFR BLD AUTO: 2.9 % (ref 0.3–6.2)
ERYTHROCYTE [DISTWIDTH] IN BLOOD BY AUTOMATED COUNT: 13.3 % (ref 12.3–15.4)
GFR SERPL CREATININE-BSD FRML MDRD: 66 ML/MIN/1.73
GLUCOSE SERPL-MCNC: 101 MG/DL (ref 65–99)
HCT VFR BLD AUTO: 37.1 % (ref 34–46.6)
HGB BLD-MCNC: 12 G/DL (ref 12–15.9)
IMM GRANULOCYTES # BLD AUTO: 0.03 10*3/MM3 (ref 0–0.05)
IMM GRANULOCYTES NFR BLD AUTO: 0.4 % (ref 0–0.5)
LYMPHOCYTES # BLD AUTO: 1.43 10*3/MM3 (ref 0.7–3.1)
LYMPHOCYTES NFR BLD AUTO: 19.8 % (ref 19.6–45.3)
MCH RBC QN AUTO: 28.2 PG (ref 26.6–33)
MCHC RBC AUTO-ENTMCNC: 32.3 G/DL (ref 31.5–35.7)
MCV RBC AUTO: 87.3 FL (ref 79–97)
MONOCYTES # BLD AUTO: 0.8 10*3/MM3 (ref 0.1–0.9)
MONOCYTES NFR BLD AUTO: 11 % (ref 5–12)
NEUTROPHILS NFR BLD AUTO: 4.72 10*3/MM3 (ref 1.7–7)
NEUTROPHILS NFR BLD AUTO: 65.2 % (ref 42.7–76)
NRBC BLD AUTO-RTO: 0 /100 WBC (ref 0–0.2)
PLATELET # BLD AUTO: 185 10*3/MM3 (ref 140–450)
PMV BLD AUTO: 11.5 FL (ref 6–12)
POTASSIUM SERPL-SCNC: 4.8 MMOL/L (ref 3.5–5.2)
RBC # BLD AUTO: 4.25 10*6/MM3 (ref 3.77–5.28)
SODIUM SERPL-SCNC: 132 MMOL/L (ref 136–145)
WBC # BLD AUTO: 7.24 10*3/MM3 (ref 3.4–10.8)

## 2021-10-09 PROCEDURE — 85025 COMPLETE CBC W/AUTO DIFF WBC: CPT | Performed by: INTERNAL MEDICINE

## 2021-10-09 PROCEDURE — 80048 BASIC METABOLIC PNL TOTAL CA: CPT | Performed by: INTERNAL MEDICINE

## 2021-10-09 RX ADMIN — FUROSEMIDE 20 MG: 20 TABLET ORAL at 08:41

## 2021-10-09 RX ADMIN — FAMOTIDINE 20 MG: 20 TABLET, FILM COATED ORAL at 18:16

## 2021-10-09 RX ADMIN — Medication 500 MCG: at 08:42

## 2021-10-09 RX ADMIN — METOPROLOL SUCCINATE 25 MG: 25 TABLET, EXTENDED RELEASE ORAL at 08:41

## 2021-10-09 RX ADMIN — ASPIRIN 81 MG: 81 TABLET, CHEWABLE ORAL at 08:41

## 2021-10-09 RX ADMIN — CLONAZEPAM 0.5 MG: 0.5 TABLET ORAL at 00:05

## 2021-10-09 RX ADMIN — ACETAMINOPHEN 650 MG: 325 TABLET, FILM COATED ORAL at 12:45

## 2021-10-09 RX ADMIN — APIXABAN 2.5 MG: 2.5 TABLET, FILM COATED ORAL at 00:05

## 2021-10-09 RX ADMIN — OXYBUTYNIN CHLORIDE 5 MG: 5 TABLET, EXTENDED RELEASE ORAL at 18:16

## 2021-10-09 RX ADMIN — CASTOR OIL AND BALSAM, PERU 1 APPLICATION: 788; 87 OINTMENT TOPICAL at 08:46

## 2021-10-09 RX ADMIN — CLONAZEPAM 0.5 MG: 0.5 TABLET ORAL at 20:11

## 2021-10-09 RX ADMIN — CARBAMIDE PEROXIDE 6.5% 10 DROP: 6.5 LIQUID AURICULAR (OTIC) at 08:46

## 2021-10-09 RX ADMIN — POTASSIUM CHLORIDE 10 MEQ: 750 TABLET, EXTENDED RELEASE ORAL at 08:42

## 2021-10-09 RX ADMIN — APIXABAN 2.5 MG: 2.5 TABLET, FILM COATED ORAL at 08:42

## 2021-10-09 RX ADMIN — SODIUM CHLORIDE, PRESERVATIVE FREE 10 ML: 5 INJECTION INTRAVENOUS at 20:11

## 2021-10-09 RX ADMIN — PAROXETINE HYDROCHLORIDE HEMIHYDRATE 10 MG: 10 TABLET, FILM COATED ORAL at 20:11

## 2021-10-09 RX ADMIN — POLYETHYLENE GLYCOL 3350 17 G: 17 POWDER, FOR SOLUTION ORAL at 08:42

## 2021-10-09 RX ADMIN — POTASSIUM CHLORIDE 10 MEQ: 750 TABLET, EXTENDED RELEASE ORAL at 20:11

## 2021-10-09 RX ADMIN — FAMOTIDINE 20 MG: 20 TABLET, FILM COATED ORAL at 08:41

## 2021-10-09 RX ADMIN — APIXABAN 2.5 MG: 2.5 TABLET, FILM COATED ORAL at 20:11

## 2021-10-09 NOTE — PROGRESS NOTES
Name: Shira Davila ADMIT: 10/5/2021   : 1921  PCP: Aletha Rincon MD    MRN: [id number] LOS: 4 days   AGE/SEX: 89 y.o. female  ROOM: [facility]/     Subjective   Subjective   Improving anxiety but continues with weakness..  No chest pain.  No palpitation.  No shortness of breath.  No PND.  No cough.  No wheeze.  No hemoptysis.  No lower extremity edema.  No fever or chills.    Review of Systems    GI.  No abdominal pain or nausea or vomiting.  Resolved constipation with normal bowel movement and no blood per rectum or melena  .  No dysuria or hematuria.     Objective   Objective   Vital Signs  Temp:  [97.2 °F (36.2 °C)-97.6 °F (36.4 °C)] 97.2 °F (36.2 °C)  Heart Rate:  [62-82] 62  Resp:  [16] 16  BP: (120-136)/(62-85) 120/62  SpO2:  [95 %] 95 %  on   ;   Device (Oxygen Therapy): room air    Intake/Output Summary (Last 24 hours) at 10/9/2021 1258  Last data filed at 10/9/2021 0500  Gross per 24 hour   Intake 240 ml   Output 200 ml   Net 40 ml     Body mass index is 20.43 kg/m².      10/07/21  1006 10/08/21  0500 10/09/21  0634   Weight: 55.8 kg (123 lb) 56 kg (123 lb 7.3 oz) 54 kg (119 lb 0.8 oz)     Physical Exam    General.  Elderly female.  Alert and oriented x2.  Less anxious and depressed today.  In no respiratory distress.  Eyes.  Pupils equal round and reactive.  Intact extraocular musculature.  No pallor or jaundice.  Normal conjunctive and lids.  Oral cavity.  Moist mucous membrane.  Neck.  Supple.  No JVD.  No lymphadenopathy or thyromegaly.  Cardiovascular.  Regular rate and rhythm.  Occasional ectopic beats.   grade 2 systolic murmur.  Chest.  Clear to auscultation bilaterally with no added sounds.  Abdomen.  Soft lax.  No tenderness.  No organomegaly.  No guarding or rebound.  Extremities.  No clubbing cyanosis or edema.  CNS.  No acute focal neurological deficits.    Results Review:      Results from last 7 days   Lab Units 10/09/21  0541 10/08/21  0402 10/07/21  0419 10/06/21  0244  10/05/21  1520   SODIUM mmol/L 132* 136 135* 138 139   POTASSIUM mmol/L 4.8 4.5 4.3 4.0 4.2   CHLORIDE mmol/L 101 101 101 103 103   CO2 mmol/L 22.5 26.0 23.7 27.1 26.1   BUN mg/dL 18 23 22 25* 24*   CREATININE mg/dL 0.80 1.20* 1.03* 1.10* 1.25*   GLUCOSE mg/dL 101* 86 105* 85 107*   CALCIUM mg/dL 8.8 9.0 8.8 8.9 9.5   AST (SGOT) U/L  --  24 34*  --  32   ALT (SGPT) U/L  --  31 39*  --  43*     Estimated Creatinine Clearance: 32.7 mL/min (by C-G formula based on SCr of 0.8 mg/dL).          Results from last 7 days   Lab Units 10/06/21  0243 10/05/21  1520   TROPONIN T ng/mL 0.038* 0.045*     Results from last 7 days   Lab Units 10/05/21  1520   PROBNP pg/mL 11,205.0*     Results from last 7 days   Lab Units 10/06/21  0243   TSH uIU/mL 3.320     Results from last 7 days   Lab Units 10/08/21  0402 10/07/21  0419 10/06/21  0243 10/05/21  1520   MAGNESIUM mg/dL 2.1 2.2 2.0 2.1           Invalid input(s): LDLCALC  Results from last 7 days   Lab Units 10/09/21  0541 10/08/21  0402 10/08/21  0402 10/07/21  0419 10/07/21  0419 10/06/21  0243 10/05/21  1520 10/05/21  1520   WBC 10*3/mm3 7.24  --  6.13  --  5.59 6.18  --  6.89   HEMOGLOBIN g/dL 12.0  --  12.4  --  12.3 11.6*  --  12.2   HEMATOCRIT % 37.1  --  37.2  --  38.6 35.8  --  37.0   PLATELETS 10*3/mm3 185  --  199  --  180 171  --  184   MCV fL 87.3   < > 86.3   < > 90.0 88.4   < > 87.7   MCH pg 28.2   < > 28.8   < > 28.7 28.6   < > 28.9   MCHC g/dL 32.3   < > 33.3   < > 31.9 32.4   < > 33.0   RDW % 13.3   < > 13.3   < > 13.5 13.4   < > 13.3   RDW-SD fl 42.0   < > 41.6   < > 44.4 43.7   < > 42.6   MPV fL 11.5   < > 11.5   < > 11.5 11.2   < > 11.0   NEUTROPHIL % % 65.2   < > 58.5   < > 60.6  --    < > 72.3   LYMPHOCYTE % % 19.8   < > 26.3   < > 24.3  --    < > 17.1*   MONOCYTES % % 11.0   < > 11.6   < > 10.6  --    < > 9.3   EOSINOPHIL % % 2.9   < > 2.6   < > 3.4  --    < > 0.6   BASOPHIL % % 0.7   < > 0.8   < > 0.7  --    < > 0.4   IMM GRAN % % 0.4   < > 0.2   < > 0.4   --    < > 0.3   NEUTROS ABS 10*3/mm3 4.72   < > 3.59   < > 3.39  --    < > 4.98   LYMPHS ABS 10*3/mm3 1.43   < > 1.61   < > 1.36  --    < > 1.18   MONOS ABS 10*3/mm3 0.80   < > 0.71   < > 0.59  --    < > 0.64   EOS ABS 10*3/mm3 0.21   < > 0.16   < > 0.19  --    < > 0.04   BASOS ABS 10*3/mm3 0.05   < > 0.05   < > 0.04  --    < > 0.03   IMMATURE GRANS (ABS) 10*3/mm3 0.03   < > 0.01   < > 0.02  --    < > 0.02   NRBC /100 WBC 0.0   < > 0.0   < > 0.0  --    < > 0.0    < > = values in this interval not displayed.     Results from last 7 days   Lab Units 10/05/21  1520   INR  1.10                                       Imaging:  Imaging Results (Last 24 Hours)     ** No results found for the last 24 hours. **             I reviewed the patient's new clinical results / labs / tests / procedures      Assessment/Plan     Active Hospital Problems    Diagnosis  POA   • **Acute deep vein thrombosis (DVT) of femoral vein of right lower extremity ([facility]) [I82.411]  Yes   • NICM (nonischemic cardiomyopathy) ([facility]) [I42.8]  Yes   • Stage 3a chronic kidney disease ([facility]) [N18.31]  Yes   • Acute on chronic systolic CHF (congestive heart failure) ([facility]) [I50.23]  Yes   • Pleural effusion, bilateral [J90]  Yes   • V-tach ([facility]) [I47.2]  No   • New onset a-fib ([facility]) [I48.91]  Yes   • Bilateral impacted cerumen [H61.23]  Yes   • Coronary artery disease [I25.10]  Yes   • Benign essential HTN [I10]  Yes   • Chronic constipation [K59.09]  Yes      Resolved Hospital Problems   No resolved problems to display.           · Right lower extremity DVT.  No clinical evidence of PE.  Negative CT a of the chest for PE.  Hemodynamically stable.  Switch from Lovenox to Eliquis yesterday.  · Acute on chronic systolic congestive heart failure/new onset atrial fibrillation/nonsustained V. tach.  In a patient with a history of coronary artery disease, hypertension.  Patient asymptomatic.  No evidence of active angina or congestive heart failure clinically.  Good  blood pressure control.  Currently on aspirin/Toprol/Lasix/Eliquis. .  Blood pressure does not tolerate ACE inhibitors.  Cardiology does not plan any intervention.  Cardiology is okay to discharge.  · Anxiety/panic disorder.  Improved after increasing as needed Klonopin and on Paxil.   · Stage IIIa chronic renal failure.  BUN and creatinine have improved from baseline now in stage II chronic renal failure.  Patient is euvolemic.  Continue to monitor.  · Constipation.  Resolved.  Continue home bowel regimen.  · Overactive bladder.  Continue Ditropan.      · Discussed with patient/nurse.  · Disposition.  To skilled facility once bed is available.  Not safe to be discharged home per PT. as of today no bed is available.      Sabi Harden MD  [facility]  10/09/21  12:58 EDT

## 2021-10-09 NOTE — PLAN OF CARE
Goal Outcome Evaluation:           Progress: no change  Outcome Summary: VSS, pt still very anxious and at times tearful, awaiting bed at [facility], pt refusing ronal boots off and on all day, Q2 turn and barrier cream applied, pure wic in place, will continue to monitor

## 2021-10-10 PROBLEM — Z66 DNR (DO NOT RESUSCITATE): Status: ACTIVE | Noted: 2021-10-10

## 2021-10-10 LAB
ANION GAP SERPL CALCULATED.3IONS-SCNC: 7.1 MMOL/L (ref 5–15)
BUN SERPL-MCNC: 17 MG/DL (ref 8–23)
BUN/CREAT SERPL: 19.5 (ref 7–25)
CALCIUM SPEC-SCNC: 8.4 MG/DL (ref 8.2–9.6)
CHLORIDE SERPL-SCNC: 98 MMOL/L (ref 98–107)
CO2 SERPL-SCNC: 21.9 MMOL/L (ref 22–29)
CREAT SERPL-MCNC: 0.87 MG/DL (ref 0.57–1)
DEPRECATED RDW RBC AUTO: 42.6 FL (ref 37–54)
ERYTHROCYTE [DISTWIDTH] IN BLOOD BY AUTOMATED COUNT: 13.2 % (ref 12.3–15.4)
GFR SERPL CREATININE-BSD FRML MDRD: 60 ML/MIN/1.73
GLUCOSE SERPL-MCNC: 86 MG/DL (ref 65–99)
HCT VFR BLD AUTO: 38.7 % (ref 34–46.6)
HGB BLD-MCNC: 12.6 G/DL (ref 12–15.9)
MCH RBC QN AUTO: 28.5 PG (ref 26.6–33)
MCHC RBC AUTO-ENTMCNC: 32.6 G/DL (ref 31.5–35.7)
MCV RBC AUTO: 87.6 FL (ref 79–97)
PLATELET # BLD AUTO: 191 10*3/MM3 (ref 140–450)
PMV BLD AUTO: 11.3 FL (ref 6–12)
POTASSIUM SERPL-SCNC: 5 MMOL/L (ref 3.5–5.2)
RBC # BLD AUTO: 4.42 10*6/MM3 (ref 3.77–5.28)
SODIUM SERPL-SCNC: 127 MMOL/L (ref 136–145)
WBC # BLD AUTO: 6.47 10*3/MM3 (ref 3.4–10.8)

## 2021-10-10 PROCEDURE — 85027 COMPLETE CBC AUTOMATED: CPT | Performed by: INTERNAL MEDICINE

## 2021-10-10 PROCEDURE — 80048 BASIC METABOLIC PNL TOTAL CA: CPT | Performed by: INTERNAL MEDICINE

## 2021-10-10 RX ORDER — FAMOTIDINE 20 MG/1
20 TABLET, FILM COATED ORAL DAILY
Status: DISCONTINUED | OUTPATIENT
Start: 2021-10-11 | End: 2021-10-14 | Stop reason: HOSPADM

## 2021-10-10 RX ADMIN — POTASSIUM CHLORIDE 10 MEQ: 750 TABLET, EXTENDED RELEASE ORAL at 20:15

## 2021-10-10 RX ADMIN — APIXABAN 2.5 MG: 2.5 TABLET, FILM COATED ORAL at 20:15

## 2021-10-10 RX ADMIN — CASTOR OIL AND BALSAM, PERU 1 APPLICATION: 788; 87 OINTMENT TOPICAL at 10:16

## 2021-10-10 RX ADMIN — PAROXETINE HYDROCHLORIDE HEMIHYDRATE 10 MG: 10 TABLET, FILM COATED ORAL at 20:15

## 2021-10-10 RX ADMIN — OXYBUTYNIN CHLORIDE 5 MG: 5 TABLET, EXTENDED RELEASE ORAL at 18:13

## 2021-10-10 RX ADMIN — METOPROLOL SUCCINATE 25 MG: 25 TABLET, EXTENDED RELEASE ORAL at 08:42

## 2021-10-10 RX ADMIN — ASPIRIN 81 MG: 81 TABLET, CHEWABLE ORAL at 08:42

## 2021-10-10 RX ADMIN — FAMOTIDINE 20 MG: 20 TABLET, FILM COATED ORAL at 08:42

## 2021-10-10 RX ADMIN — APIXABAN 2.5 MG: 2.5 TABLET, FILM COATED ORAL at 08:42

## 2021-10-10 RX ADMIN — FUROSEMIDE 20 MG: 20 TABLET ORAL at 08:42

## 2021-10-10 RX ADMIN — CLONAZEPAM 0.5 MG: 0.5 TABLET ORAL at 20:15

## 2021-10-10 RX ADMIN — POTASSIUM CHLORIDE 10 MEQ: 750 TABLET, EXTENDED RELEASE ORAL at 08:42

## 2021-10-10 RX ADMIN — CASTOR OIL AND BALSAM, PERU 1 APPLICATION: 788; 87 OINTMENT TOPICAL at 20:15

## 2021-10-10 RX ADMIN — Medication 500 MCG: at 08:42

## 2021-10-10 RX ADMIN — Medication 3 MG: at 20:58

## 2021-10-10 NOTE — PROGRESS NOTES
Name: Shira Davila ADMIT: 10/5/2021   : 1921  PCP: Aletha Rincon MD    MRN: [id number] LOS: 5 days   AGE/SEX: 89 y.o. female  ROOM: [facility]/     Subjective   Subjective   CC: generalized weakness  No acute events. Denies new complaints. Taking PO but not much. No CP/dyspnea/f/c/n/v/d.    Objective   Objective   Vital Signs  Temp:  [97.4 °F (36.3 °C)-97.6 °F (36.4 °C)] 97.5 °F (36.4 °C)  Heart Rate:  [61-92] 92  Resp:  [16] 16  BP: (122-144)/(69-95) 144/95  SpO2:  [90 %-94 %] 94 %  on   ;   Device (Oxygen Therapy): room air  Body mass index is 19.68 kg/m².  Physical Exam  Vitals and nursing note reviewed.   Constitutional:       General: She is not in acute distress.     Appearance: She is not toxic-appearing or diaphoretic.      Comments: Elderly, frail   HENT:      Head: Normocephalic and atraumatic.      Nose: Nose normal.      Mouth/Throat:      Mouth: Mucous membranes are moist.      Pharynx: Oropharynx is clear.   Eyes:      Extraocular Movements: Extraocular movements intact.      Conjunctiva/sclera: Conjunctivae normal.      Pupils: Pupils are equal, round, and reactive to light.   Cardiovascular:      Rate and Rhythm: Normal rate and regular rhythm.      Pulses: Normal pulses.   Pulmonary:      Effort: Pulmonary effort is normal.      Breath sounds: Normal breath sounds.   Abdominal:      General: Bowel sounds are normal.      Palpations: Abdomen is soft.      Tenderness: There is no abdominal tenderness.   Musculoskeletal:         General: No swelling or tenderness.      Cervical back: Normal range of motion and neck supple.   Skin:     General: Skin is warm and dry.      Capillary Refill: Capillary refill takes less than 2 seconds.   Neurological:      General: No focal deficit present.      Mental Status: She is alert.   Psychiatric:         Mood and Affect: Mood normal.         Behavior: Behavior normal.     Results Review     I reviewed the patient's new clinical results.  I reviewed  the patient's telemetry.  Results from last 7 days   Lab Units 10/10/21  0429 10/09/21  0541 10/08/21  0402 10/07/21  0419   WBC 10*3/mm3 6.47 7.24 6.13 5.59   HEMOGLOBIN g/dL 12.6 12.0 12.4 12.3   PLATELETS 10*3/mm3 191 185 199 180     Results from last 7 days   Lab Units 10/10/21  0429 10/09/21  0541 10/08/21  0402 10/07/21  0419   SODIUM mmol/L 127* 132* 136 135*   POTASSIUM mmol/L 5.0 4.8 4.5 4.3   CHLORIDE mmol/L 98 101 101 101   CO2 mmol/L 21.9* 22.5 26.0 23.7   BUN mg/dL 17 18 23 22   CREATININE mg/dL 0.87 0.80 1.20* 1.03*   GLUCOSE mg/dL 86 101* 86 105*   Estimated Creatinine Clearance: 28.9 mL/min (by C-G formula based on SCr of 0.87 mg/dL).  Results from last 7 days   Lab Units 10/08/21  0402 10/07/21  0419 10/05/21  1520   ALBUMIN g/dL 3.40* 3.40* 4.00   BILIRUBIN mg/dL 0.3 0.5 0.4   ALK PHOS U/L 64 65 75   AST (SGOT) U/L 24 34* 32   ALT (SGPT) U/L 31 39* 43*     Results from last 7 days   Lab Units 10/10/21  0429 10/09/21  0541 10/08/21  0402 10/07/21  0419 10/06/21  0243 10/06/21  0243 10/05/21  1520 10/05/21  1520   CALCIUM mg/dL 8.4 8.8 9.0 8.8   < > 8.9   < > 9.5   ALBUMIN g/dL  --   --  3.40* 3.40*  --   --   --  4.00   MAGNESIUM mg/dL  --   --  2.1 2.2  --  2.0  --  2.1    < > = values in this interval not displayed.       COVID19   Date Value Ref Range Status   10/08/2021 Not Detected Not Detected - Ref. Range Final   10/05/2021 Not Detected Not Detected - Ref. Range Final     No results found for: HGBA1C, POCGLU    Adult Transthoracic Echo Complete w/ Color, Spectral and Contrast if necessary per protocol  · Calculated left ventricular EF = 19.1% Estimated left ventricular EF was   in agreement with the calculated left ventricular EF. Left ventricular   systolic function is severely decreased with severe global hypokinesis.  · Left ventricular diastolic function is consistent with (grade II w/high   LAP) pseudonormalization. Elevated left atrial filling pressure.  · Moderately reduced right  ventricular systolic function noted.  · Severe biatrial enlargement  · There are myxomatous changes of the mitral valve apparatus present.Mild   mitral valve regurgitation is present  · There is a moderate sized left pleural effusion.  · Atrial fibrillation was the predominant rhythm observed during the   procedure.       Scheduled Medications  apixaban, 2.5 mg, Oral, Q12H  aspirin, 81 mg, Oral, Daily  castor oil-balsam peru, 1 application, Topical, Q12H  [START ON 10/11/2021] famotidine, 20 mg, Oral, Daily  furosemide, 20 mg, Oral, Daily  metoprolol succinate XL, 25 mg, Oral, Q24H  oxybutynin XL, 5 mg, Oral, Daily Before Supper  PARoxetine, 10 mg, Oral, Nightly  polyethylene glycol, 17 g, Oral, Daily  potassium chloride, 10 mEq, Oral, BID  senna-docusate sodium, 2 tablet, Oral, Nightly  vitamin B-12, 500 mcg, Oral, Daily    Infusions   Diet  Diet Soft Texture; Ground; Cardiac       Assessment/Plan     Active Hospital Problems    Diagnosis  POA   • **Acute deep vein thrombosis (DVT) of femoral vein of right lower extremity ([facility]) [I82.411]  Yes   • DNR (do not resuscitate) [Z66]  No   • NICM (nonischemic cardiomyopathy) ([facility]) [I42.8]  Yes   • Stage 3a chronic kidney disease ([facility]) [N18.31]  Yes   • Acute on chronic systolic CHF (congestive heart failure) ([facility]) [I50.23]  Yes   • Pleural effusion, bilateral [J90]  Yes   • V-tach ([facility]) [I47.2]  No   • New onset a-fib ([facility]) [I48.91]  Yes   • Bilateral impacted cerumen [H61.23]  Yes   • Coronary artery disease [I25.10]  Yes   • Benign essential HTN [I10]  Yes   • Chronic constipation [K59.09]  Yes      Resolved Hospital Problems   No resolved problems to display.   Acute RLE DVT  - no PE on CTA chest  - continue eliquis    Acute on Chronic Systolic CHF  - responded well to diuresis, appears euvolemic  - continue on current regimen  - complicated by stage 3a CKD which is now stable    PAF  - new diagnosis this admission  - continue on metoprolol, eliquis    Anxiety/Panic  Disorder  - continue clonazepam and paxil    Eliquis (home med) for DVT prophylaxis.  DNR.I confirmed with the patient at bedside.  Discussed with patient and nursing staff.  Anticipate discharge to SNU facility tomorrow.      Geraldo Welch MD  [facility]  10/10/21  18:51 EDT

## 2021-10-10 NOTE — CASE MANAGEMENT/SOCIAL WORK
Continued Stay Note  [facility]     Patient Name: Shira Davila  MRN: [id number]  Today's Date: 10/10/2021    Admit Date: 10/5/2021     Discharge Plan     Row Name 10/10/21 1133       Plan    Plan [facility].    Provided Post Acute Provider List? N/A    Provided Post Acute Provider Quality & Resource List? N/A    Patient/Family in Agreement with Plan unable to assess    Plan Comments CCP received call from patient’s nurse, need to verify if bed ready at [facility]. CCP left voicemail to [facility] at [phone] gave call back number of [phone]. CCP to follow. Margarita Garcia RN.               Discharge Codes    No documentation.               Expected Discharge Date and Time     Expected Discharge Date Expected Discharge Time    Oct 10, 2021             Irasema Garcia RN

## 2021-10-10 NOTE — PLAN OF CARE
Goal Outcome Evaluation:           Progress: no change  Outcome Summary: VSS, no c/o pain, pt very tearful this AM but much improved over day, ronal boots in place, barrier cream applied Q2, Q2 turn, + BM, + urine output, still waiting for a bed at [facility], will continue to monitor

## 2021-10-10 NOTE — PLAN OF CARE
Problem: Adult Inpatient Plan of Care  Goal: Plan of Care Review  Flowsheets  Taken 10/10/2021 0538 by Nighat Gonzales, RN  Plan of Care Reviewed With: patient  Outcome Summary: VSS. Pt anxious and tearful at times. Pt refuses ronal boots and refusing to place meplix to coccyx. Barrier cream applied and pt turned every 2hrs. Prn clonazepam given tonight and pt seemed to sleep better between care but woke up anxious at times. Pending discharge to [facility] once bed available. Will continue to monitor.  Taken 10/9/2021 1608 by eZhra Vieyra, RN  Progress: no change   Goal Outcome Evaluation:  Plan of Care Reviewed With: patient        Progress: no change  Outcome Summary: VSS. Pt anxious and tearful at times. Pt refuses ronal boots and refusing to place meplix to coccyx. Barrier cream applied and pt turned every 2hrs. Prn clonazepam given tonight and pt seemed to sleep better between care but woke up anxious at times. Pending discharge to [facility] once bed available. Will continue to monitor.

## 2021-10-11 LAB
ANION GAP SERPL CALCULATED.3IONS-SCNC: 12.3 MMOL/L (ref 5–15)
BUN SERPL-MCNC: 18 MG/DL (ref 8–23)
BUN/CREAT SERPL: 17.6 (ref 7–25)
CALCIUM SPEC-SCNC: 9 MG/DL (ref 8.2–9.6)
CHLORIDE SERPL-SCNC: 93 MMOL/L (ref 98–107)
CO2 SERPL-SCNC: 21.7 MMOL/L (ref 22–29)
CREAT SERPL-MCNC: 1.02 MG/DL (ref 0.57–1)
GFR SERPL CREATININE-BSD FRML MDRD: 50 ML/MIN/1.73
GLUCOSE SERPL-MCNC: 116 MG/DL (ref 65–99)
POTASSIUM SERPL-SCNC: 5.3 MMOL/L (ref 3.5–5.2)
SODIUM SERPL-SCNC: 127 MMOL/L (ref 136–145)

## 2021-10-11 PROCEDURE — 80048 BASIC METABOLIC PNL TOTAL CA: CPT | Performed by: INTERNAL MEDICINE

## 2021-10-11 RX ORDER — FUROSEMIDE 40 MG/1
40 TABLET ORAL DAILY
Status: DISCONTINUED | OUTPATIENT
Start: 2021-10-12 | End: 2021-10-14 | Stop reason: HOSPADM

## 2021-10-11 RX ORDER — SODIUM CHLORIDE 1000 MG
1 TABLET, SOLUBLE MISCELLANEOUS
Status: DISCONTINUED | OUTPATIENT
Start: 2021-10-11 | End: 2021-10-12

## 2021-10-11 RX ORDER — OXYBUTYNIN CHLORIDE 5 MG/1
2.5 TABLET ORAL 2 TIMES DAILY
Status: DISCONTINUED | OUTPATIENT
Start: 2021-10-11 | End: 2021-10-14 | Stop reason: HOSPADM

## 2021-10-11 RX ORDER — FUROSEMIDE 20 MG/1
20 TABLET ORAL ONCE
Status: COMPLETED | OUTPATIENT
Start: 2021-10-11 | End: 2021-10-11

## 2021-10-11 RX ADMIN — DOCUSATE SODIUM 50MG AND SENNOSIDES 8.6MG 2 TABLET: 8.6; 5 TABLET, FILM COATED ORAL at 21:09

## 2021-10-11 RX ADMIN — APIXABAN 2.5 MG: 2.5 TABLET, FILM COATED ORAL at 21:09

## 2021-10-11 RX ADMIN — APIXABAN 2.5 MG: 2.5 TABLET, FILM COATED ORAL at 09:28

## 2021-10-11 RX ADMIN — POTASSIUM CHLORIDE 10 MEQ: 750 TABLET, EXTENDED RELEASE ORAL at 09:28

## 2021-10-11 RX ADMIN — PAROXETINE HYDROCHLORIDE HEMIHYDRATE 10 MG: 10 TABLET, FILM COATED ORAL at 21:09

## 2021-10-11 RX ADMIN — FAMOTIDINE 20 MG: 20 TABLET, FILM COATED ORAL at 09:28

## 2021-10-11 RX ADMIN — FUROSEMIDE 20 MG: 20 TABLET ORAL at 17:21

## 2021-10-11 RX ADMIN — SODIUM CHLORIDE TAB 1 GM 1 G: 1 TAB at 17:21

## 2021-10-11 RX ADMIN — FUROSEMIDE 20 MG: 20 TABLET ORAL at 09:28

## 2021-10-11 RX ADMIN — SODIUM CHLORIDE TAB 1 GM 1 G: 1 TAB at 21:10

## 2021-10-11 RX ADMIN — CASTOR OIL AND BALSAM, PERU 1 APPLICATION: 788; 87 OINTMENT TOPICAL at 15:15

## 2021-10-11 RX ADMIN — Medication 500 MCG: at 09:28

## 2021-10-11 RX ADMIN — POLYETHYLENE GLYCOL 3350 17 G: 17 POWDER, FOR SOLUTION ORAL at 09:25

## 2021-10-11 RX ADMIN — METOPROLOL SUCCINATE 25 MG: 25 TABLET, EXTENDED RELEASE ORAL at 09:28

## 2021-10-11 RX ADMIN — OXYBUTYNIN CHLORIDE 2.5 MG: 5 TABLET ORAL at 21:16

## 2021-10-11 RX ADMIN — ASPIRIN 81 MG: 81 TABLET, CHEWABLE ORAL at 09:28

## 2021-10-11 NOTE — PROGRESS NOTES
Name: Shira Davila ADMIT: 10/5/2021   : 1921  PCP: Aletah Rincon MD    MRN: [id number] LOS: 6 days   AGE/SEX: 89 y.o. female  ROOM: [facility]/     Subjective   Subjective   CC: generalized weakness  No acute events. Denies new complaints. Taking PO but not much. No CP/dyspnea/f/c/n/v/d.    Objective   Objective   Vital Signs  Temp:  [97.3 °F (36.3 °C)-97.4 °F (36.3 °C)] 97.3 °F (36.3 °C)  Heart Rate:  [60-71] 60  Resp:  [16] 16  BP: (143-158)/(74-84) 158/82  SpO2:  [92 %-96 %] 92 %  on   ;   Device (Oxygen Therapy): room air  Body mass index is 20.06 kg/m².  Physical Exam  Vitals and nursing note reviewed.   Constitutional:       General: She is not in acute distress.     Appearance: She is not toxic-appearing or diaphoretic.      Comments: Elderly, frail   HENT:      Head: Normocephalic and atraumatic.      Nose: Nose normal.      Mouth/Throat:      Mouth: Mucous membranes are moist.      Pharynx: Oropharynx is clear.   Eyes:      Extraocular Movements: Extraocular movements intact.      Conjunctiva/sclera: Conjunctivae normal.      Pupils: Pupils are equal, round, and reactive to light.   Cardiovascular:      Rate and Rhythm: Normal rate and regular rhythm.      Pulses: Normal pulses.   Pulmonary:      Effort: Pulmonary effort is normal.      Breath sounds: Normal breath sounds.   Abdominal:      General: Bowel sounds are normal.      Palpations: Abdomen is soft.      Tenderness: There is no abdominal tenderness.   Musculoskeletal:         General: No swelling or tenderness.      Cervical back: Normal range of motion and neck supple.   Skin:     General: Skin is warm and dry.      Capillary Refill: Capillary refill takes less than 2 seconds.   Neurological:      General: No focal deficit present.      Mental Status: She is alert.   Psychiatric:         Mood and Affect: Mood normal.         Behavior: Behavior normal.     Results Review     I reviewed the patient's new clinical results.  I reviewed  the patient's telemetry.  Results from last 7 days   Lab Units 10/10/21  0429 10/09/21  0541 10/08/21  0402 10/07/21  0419   WBC 10*3/mm3 6.47 7.24 6.13 5.59   HEMOGLOBIN g/dL 12.6 12.0 12.4 12.3   PLATELETS 10*3/mm3 191 185 199 180     Results from last 7 days   Lab Units 10/11/21  1105 10/10/21  0429 10/09/21  0541 10/08/21  0402   SODIUM mmol/L 127* 127* 132* 136   POTASSIUM mmol/L 5.3* 5.0 4.8 4.5   CHLORIDE mmol/L 93* 98 101 101   CO2 mmol/L 21.7* 21.9* 22.5 26.0   BUN mg/dL 18 17 18 23   CREATININE mg/dL 1.02* 0.87 0.80 1.20*   GLUCOSE mg/dL 116* 86 101* 86   Estimated Creatinine Clearance: 25.2 mL/min (A) (by C-G formula based on SCr of 1.02 mg/dL (H)).  Results from last 7 days   Lab Units 10/08/21  0402 10/07/21  0419 10/05/21  1520   ALBUMIN g/dL 3.40* 3.40* 4.00   BILIRUBIN mg/dL 0.3 0.5 0.4   ALK PHOS U/L 64 65 75   AST (SGOT) U/L 24 34* 32   ALT (SGPT) U/L 31 39* 43*     Results from last 7 days   Lab Units 10/11/21  1105 10/10/21  0429 10/09/21  0541 10/08/21  0402 10/07/21  0419 10/07/21  0419 10/06/21  0243 10/06/21  0243 10/05/21  1520 10/05/21  1520   CALCIUM mg/dL 9.0 8.4 8.8 9.0   < > 8.8   < > 8.9   < > 9.5   ALBUMIN g/dL  --   --   --  3.40*  --  3.40*  --   --   --  4.00   MAGNESIUM mg/dL  --   --   --  2.1  --  2.2  --  2.0  --  2.1    < > = values in this interval not displayed.       COVID19   Date Value Ref Range Status   10/08/2021 Not Detected Not Detected - Ref. Range Final   10/05/2021 Not Detected Not Detected - Ref. Range Final     No results found for: HGBA1C, POCGLU    Adult Transthoracic Echo Complete w/ Color, Spectral and Contrast if necessary per protocol  · Calculated left ventricular EF = 19.1% Estimated left ventricular EF was   in agreement with the calculated left ventricular EF. Left ventricular   systolic function is severely decreased with severe global hypokinesis.  · Left ventricular diastolic function is consistent with (grade II w/high   LAP) pseudonormalization.  Elevated left atrial filling pressure.  · Moderately reduced right ventricular systolic function noted.  · Severe biatrial enlargement  · There are myxomatous changes of the mitral valve apparatus present.Mild   mitral valve regurgitation is present  · There is a moderate sized left pleural effusion.  · Atrial fibrillation was the predominant rhythm observed during the   procedure.       Scheduled Medications  apixaban, 2.5 mg, Oral, Q12H  aspirin, 81 mg, Oral, Daily  castor oil-balsam peru, 1 application, Topical, Q12H  famotidine, 20 mg, Oral, Daily  furosemide, 20 mg, Oral, Once  [START ON 10/12/2021] furosemide, 40 mg, Oral, Daily  metoprolol succinate XL, 25 mg, Oral, Q24H  oxybutynin XL, 5 mg, Oral, Daily Before Supper  PARoxetine, 10 mg, Oral, Nightly  polyethylene glycol, 17 g, Oral, Daily  senna-docusate sodium, 2 tablet, Oral, Nightly  sodium chloride, 1 g, Oral, TID With Meals  vitamin B-12, 500 mcg, Oral, Daily    Infusions   Diet  Diet Soft Texture; Ground; Cardiac       Assessment/Plan     Active Hospital Problems    Diagnosis  POA   • **Acute deep vein thrombosis (DVT) of femoral vein of right lower extremity ([facility]) [I82.411]  Yes   • DNR (do not resuscitate) [Z66]  No   • NICM (nonischemic cardiomyopathy) ([facility]) [I42.8]  Yes   • Stage 3a chronic kidney disease ([facility]) [N18.31]  Yes   • Acute on chronic systolic CHF (congestive heart failure) ([facility]) [I50.23]  Yes   • Pleural effusion, bilateral [J90]  Yes   • V-tach ([facility]) [I47.2]  No   • New onset a-fib ([facility]) [I48.91]  Yes   • Bilateral impacted cerumen [H61.23]  Yes   • Coronary artery disease [I25.10]  Yes   • Benign essential HTN [I10]  Yes   • Chronic constipation [K59.09]  Yes      Resolved Hospital Problems   No resolved problems to display.   Acute RLE DVT  - no PE on CTA chest  - continue eliquis    Acute on Chronic Systolic CHF  - responded well to diuresis, appears euvolemic  - continue on current regimen  - complicated by stage 3a CKD which is  now stable    PAF  - new diagnosis this admission  - continue on metoprolol, eliquis    Hyperkalemia  - mild, stop potassium supplement    Hyponatremia  - she does appear euvolemic-I suspect she has some decreased solute intake and maybe SIADH component  - will place on salt tabs and increase lasix to 40mg daily given CHF    Anxiety/Panic Disorder  - continue clonazepam and paxil    Eliquis (home med) for DVT prophylaxis.  DNR.I confirmed with the patient at bedside.  Discussed with patient and nursing staff.  Anticipate discharge to SNU facility tomorrow.      Geraldo Welch MD  [facility]  10/11/21  14:39 EDT

## 2021-10-11 NOTE — PROGRESS NOTES
Adult Nutrition  Assessment/PES    Patient Name:  Shira Davila  YOB: 1921  MRN: [id number]  Admit Date:  10/5/2021    Assessment Date:  10/11/2021    Comments:  Nutrition follow up. Po intake still not great. Having BM's now. Pt sleeping soundly - did not awaken when I called her name. Labs, meds, skin reviewed. DC planning in progress to SNU likely tomorrow.  Will follow as needed.       Reason for Assessment     Row Name 10/11/21 1457          Reason for Assessment    Reason For Assessment follow-up protocol                Nutrition/Diet History     Row Name 10/11/21 1457          Nutrition/Diet History    Factors Affecting Nutritional Intake chewing difficulties/inability to chew food                  Labs/Tests/Procedures/Meds     Row Name 10/11/21 1457          Labs/Procedures/Meds    Lab Results Reviewed reviewed, pertinent     Lab Results Comments Na+, K+            Diagnostic Tests/Procedures    Diagnostic Test/Procedure Reviewed reviewed, pertinent            Medications    Pertinent Medications Reviewed reviewed, pertinent     Pertinent Medications Comments eliquis, asa, pepcid, lasix, paxil, miralax, pericolace, nacl, B12                Physical Findings     Row Name 10/11/21 1452          Physical Findings    Oral/Mouth Cavity poor dentition     Skin pressure injury  coccyx PI                  Nutrition Prescription Ordered     Row Name 10/11/21 1454          Nutrition Prescription PO    Current PO Diet Soft Texture     Texture Ground     Supplement Other (comment)  prune juice     Common Modifiers Cardiac                Evaluation of Received Nutrient/Fluid Intake     Row Name 10/11/21 1454          PO Evaluation    Number of Days PO Intake Evaluated Insufficient Data     % PO Intake 25% Lunch today               Problem/Interventions:     Intervention Goal     Row Name 10/11/21 145          Intervention Goal    General Maintain nutrition; Disease management/therapy; Improved  nutrition related lab(s); Reduce/improve symptoms; Meet nutritional needs for age/condition; Nutrition support treatment     PO Tolerate PO; Increase intake; PO intake (%)     PO Intake % 75 %     Weight Maintain weight                Nutrition Intervention     Row Name 10/11/21 1500          Nutrition Intervention    RD/Tech Action Care plan reviewd; Follow Tx progress                  Education/Evaluation     Row Name 10/11/21 1500          Monitor/Evaluation    Monitor Skin status; Per protocol; I&O; Symptoms; PO intake; Pertinent labs; Weight                 Electronically signed by:  Treva Nagy RD  10/11/21 15:01 EDT

## 2021-10-11 NOTE — PROGRESS NOTES
Discharge Planning Assessment  [facility]     Patient Name: Shira Davila  MRN: [id number]  Today's Date: 10/11/2021    Admit Date: 10/5/2021     Discharge Needs Assessment    No documentation.                Discharge Plan     Row Name 10/11/21 1401       Plan    Plan [facility] will need a precert for [place] started today 10/11/2021    Plan Comments Call received from [facility] will need to start [place] precert. [place] precert started today 10/11/2021. Karin SCOTT              Continued Care and Services - Admitted Since 10/5/2021     Destination     Service Provider Request Status Selected Services Address Phone Fax Patient Preferred    [facility]  Accepted N/A [address] [facility] [id number] [phone] --    [facility]  Accepted N/A [address] [facility] [address] [phone] --    [facility]  Declined  No Bed Available N/A [address] [facility] [address] [phone] --    [facility]  Declined  pt not agreeable N/A [address] [facility] [phone] [phone] --    [facility]  Declined  Patient Insurance Not Accepted N/A [address] [facility] [id number] [phone] --    [facility]  Declined  pt not agreeable N/A [address] [facility] [id number] [phone] --    [facility]  Declined  pt not agreeable N/A [address] [facility] [id number] [phone] --    [facility]  Declined  No Bed Available N/A [address] [facility] [address] [phone] --    [facility]  Declined  needs someone to help with MA jane N/A [address], [facility] [address] [phone] --    [facility]  Declined  Patient Insurance Not Accepted N/A [address],  [facility] [id number] [phone] --    [facility]  Declined  Patient Insurance Not Accepted N/A [address] [address] [phone] --    [facility]  Declined  Insurance Denial N/A [address] [address] [phone] --    [facility]  Declined  Insurance Denial N/A [address] [facility] [address] [address] [phone] --    [facility]  Declined  Insurance Denial N/A [address] [facility] [address] [phone] --    [facility]  Declined  Insurance Denial N/A [address] [facility] [address] [phone] --    [facility]  Declined  Insurance Denial N/A [address] [facility] [address] [phone] --    [facility]  Declined  No Bed Available N/A [address] [facility] [address] [address] [phone] --          Home Medical Care Coordination complete.    Service Provider Request Status Selected Services Address Phone Fax Patient Preferred    [facility] [address], [address], [facility] [address] [phone] --    [facility]  Pending - Request Sent N/A [address], [facility] [address] [phone] --    [facility]  Pending - Request Sent N/A [address] [facility] [phone] --     [facility]  Declined N/A [address] [facility] [address] [id number] [phone] --    [facility]  Unable to accept due to staffing N/A [address] [place] [id number] [phone] --              Expected Discharge Date and Time     Expected Discharge Date Expected  Discharge Time    Oct 11, 2021          Demographic Summary    No documentation.                Functional Status    No documentation.                Psychosocial    No documentation.                Abuse/Neglect    No documentation.                Legal    No documentation.                Substance Abuse    No documentation.                Patient Forms    No documentation.                   Yanelis Esquivel RN

## 2021-10-11 NOTE — PLAN OF CARE
Goal Outcome Evaluation:  Plan of Care Reviewed With: patient   Outcome Summary: VSS. No C/o of pain. Patient A/o to self. Q2 turn, barrier cream to coccyx applied. assisted with all feeds. Patient awaiting bed at [place], will continue to monitor.

## 2021-10-12 LAB
ANION GAP SERPL CALCULATED.3IONS-SCNC: 8.8 MMOL/L (ref 5–15)
BUN SERPL-MCNC: 16 MG/DL (ref 8–23)
BUN/CREAT SERPL: 22.5 (ref 7–25)
CALCIUM SPEC-SCNC: 8.3 MG/DL (ref 8.2–9.6)
CHLORIDE SERPL-SCNC: 93 MMOL/L (ref 98–107)
CO2 SERPL-SCNC: 23.2 MMOL/L (ref 22–29)
CREAT SERPL-MCNC: 0.71 MG/DL (ref 0.57–1)
CREAT UR-MCNC: 35.9 MG/DL
GFR SERPL CREATININE-BSD FRML MDRD: 76 ML/MIN/1.73
GLUCOSE SERPL-MCNC: 95 MG/DL (ref 65–99)
OSMOLALITY UR: 485 MOSM/KG
POTASSIUM SERPL-SCNC: 4.6 MMOL/L (ref 3.5–5.2)
SODIUM SERPL-SCNC: 125 MMOL/L (ref 136–145)
SODIUM UR-SCNC: 111 MMOL/L
UUN 24H UR-MCNC: 402 MG/DL

## 2021-10-12 PROCEDURE — 82570 ASSAY OF URINE CREATININE: CPT | Performed by: INTERNAL MEDICINE

## 2021-10-12 PROCEDURE — 97110 THERAPEUTIC EXERCISES: CPT

## 2021-10-12 PROCEDURE — 80048 BASIC METABOLIC PNL TOTAL CA: CPT | Performed by: INTERNAL MEDICINE

## 2021-10-12 PROCEDURE — 83935 ASSAY OF URINE OSMOLALITY: CPT | Performed by: INTERNAL MEDICINE

## 2021-10-12 PROCEDURE — 84300 ASSAY OF URINE SODIUM: CPT | Performed by: INTERNAL MEDICINE

## 2021-10-12 PROCEDURE — 84540 ASSAY OF URINE/UREA-N: CPT | Performed by: INTERNAL MEDICINE

## 2021-10-12 RX ORDER — SODIUM CHLORIDE 1000 MG
2 TABLET, SOLUBLE MISCELLANEOUS
Status: DISCONTINUED | OUTPATIENT
Start: 2021-10-12 | End: 2021-10-13

## 2021-10-12 RX ADMIN — Medication 500 MCG: at 09:28

## 2021-10-12 RX ADMIN — CASTOR OIL AND BALSAM, PERU 1 APPLICATION: 788; 87 OINTMENT TOPICAL at 21:21

## 2021-10-12 RX ADMIN — METOPROLOL TARTRATE 12.5 MG: 25 TABLET, FILM COATED ORAL at 21:20

## 2021-10-12 RX ADMIN — OXYBUTYNIN CHLORIDE 2.5 MG: 5 TABLET ORAL at 21:21

## 2021-10-12 RX ADMIN — PAROXETINE HYDROCHLORIDE HEMIHYDRATE 10 MG: 10 TABLET, FILM COATED ORAL at 21:20

## 2021-10-12 RX ADMIN — FUROSEMIDE 40 MG: 40 TABLET ORAL at 09:28

## 2021-10-12 RX ADMIN — SODIUM CHLORIDE TAB 1 GM 1 G: 1 TAB at 18:02

## 2021-10-12 RX ADMIN — FAMOTIDINE 20 MG: 20 TABLET, FILM COATED ORAL at 09:27

## 2021-10-12 RX ADMIN — OXYBUTYNIN CHLORIDE 2.5 MG: 5 TABLET ORAL at 09:28

## 2021-10-12 RX ADMIN — ASPIRIN 81 MG: 81 TABLET, CHEWABLE ORAL at 09:28

## 2021-10-12 RX ADMIN — SODIUM CHLORIDE TAB 1 GM 1 G: 1 TAB at 12:59

## 2021-10-12 RX ADMIN — DOCUSATE SODIUM 50MG AND SENNOSIDES 8.6MG 2 TABLET: 8.6; 5 TABLET, FILM COATED ORAL at 21:21

## 2021-10-12 RX ADMIN — CASTOR OIL AND BALSAM, PERU 1 APPLICATION: 788; 87 OINTMENT TOPICAL at 01:55

## 2021-10-12 RX ADMIN — SODIUM CHLORIDE TAB 1 GM 2 G: 1 TAB at 21:21

## 2021-10-12 RX ADMIN — APIXABAN 2.5 MG: 2.5 TABLET, FILM COATED ORAL at 21:20

## 2021-10-12 RX ADMIN — POLYETHYLENE GLYCOL 3350 17 G: 17 POWDER, FOR SOLUTION ORAL at 09:28

## 2021-10-12 RX ADMIN — METOPROLOL TARTRATE 12.5 MG: 25 TABLET, FILM COATED ORAL at 09:28

## 2021-10-12 RX ADMIN — APIXABAN 2.5 MG: 2.5 TABLET, FILM COATED ORAL at 09:28

## 2021-10-12 RX ADMIN — SODIUM CHLORIDE TAB 1 GM 1 G: 1 TAB at 09:28

## 2021-10-12 NOTE — PLAN OF CARE
Goal Outcome Evaluation:    Progress: no change  Outcome Summary: VSS. No c/o pain. Turned q2hr. Barrier cream and mepilex applied to coccyx. Venelex applied on heels, ronal boots on. Pt awaiting bed at [facility]. Will continue to monitor.

## 2021-10-12 NOTE — PROGRESS NOTES
"    Name: Shira Davila ADMIT: 10/5/2021   : 1921  PCP: Aletha Rincon MD    MRN: [id number] LOS: 7 days   AGE/SEX: 89 y.o. female  ROOM: [facility]     Subjective   Subjective   CC: generalized weakness  No acute events. Patient states she feels terrible but denies new specific complaints. She is quite emotional this AM. She states she has not taken PO because no one is coming to feed her. When I asked why she thinks she needs help eating she states \"because my arms don't move that way\" as she makes the proper motions with her arms. No CP/dyspnea/f/c/n/v/d.    Objective   Objective   Vital Signs  Temp:  [97.2 °F (36.2 °C)-97.6 °F (36.4 °C)] 97.2 °F (36.2 °C)  Heart Rate:  [56-67] 63  Resp:  [16] 16  BP: (138-154)/(71-81) 154/80  SpO2:  [92 %-96 %] 96 %  on  Flow (L/min):  [2] 2;   Device (Oxygen Therapy): room air  Body mass index is 24.45 kg/m².  Physical Exam  Vitals and nursing note reviewed.   Constitutional:       General: She is not in acute distress.     Appearance: She is not toxic-appearing or diaphoretic.      Comments: Elderly, frail   HENT:      Head: Normocephalic and atraumatic.      Nose: Nose normal.      Mouth/Throat:      Mouth: Mucous membranes are moist.      Pharynx: Oropharynx is clear.   Eyes:      Extraocular Movements: Extraocular movements intact.      Conjunctiva/sclera: Conjunctivae normal.      Pupils: Pupils are equal, round, and reactive to light.   Cardiovascular:      Rate and Rhythm: Normal rate and regular rhythm.      Pulses: Normal pulses.   Pulmonary:      Effort: Pulmonary effort is normal.      Breath sounds: Normal breath sounds.   Abdominal:      General: Bowel sounds are normal.      Palpations: Abdomen is soft.      Tenderness: There is no abdominal tenderness.   Musculoskeletal:         General: No swelling or tenderness.      Cervical back: Normal range of motion and neck supple.   Skin:     General: Skin is warm and dry.      Capillary Refill: Capillary refill " takes less than 2 seconds.   Neurological:      General: No focal deficit present.      Mental Status: She is alert.   Psychiatric:         Mood and Affect: Mood is anxious. Affect is tearful.         Behavior: Behavior normal.     Results Review     I reviewed the patient's new clinical results.  I reviewed the patient's telemetry.  Results from last 7 days   Lab Units 10/10/21  0429 10/09/21  0541 10/08/21  0402 10/07/21  0419   WBC 10*3/mm3 6.47 7.24 6.13 5.59   HEMOGLOBIN g/dL 12.6 12.0 12.4 12.3   PLATELETS 10*3/mm3 191 185 199 180     Results from last 7 days   Lab Units 10/12/21  0540 10/11/21  1105 10/10/21  0429 10/09/21  0541   SODIUM mmol/L 125* 127* 127* 132*   POTASSIUM mmol/L 4.6 5.3* 5.0 4.8   CHLORIDE mmol/L 93* 93* 98 101   CO2 mmol/L 23.2 21.7* 21.9* 22.5   BUN mg/dL 16 18 17 18   CREATININE mg/dL 0.71 1.02* 0.87 0.80   GLUCOSE mg/dL 95 116* 86 101*   Estimated Creatinine Clearance: 39.1 mL/min (by C-G formula based on SCr of 0.71 mg/dL).  Results from last 7 days   Lab Units 10/08/21  0402 10/07/21  0419 10/05/21  1520   ALBUMIN g/dL 3.40* 3.40* 4.00   BILIRUBIN mg/dL 0.3 0.5 0.4   ALK PHOS U/L 64 65 75   AST (SGOT) U/L 24 34* 32   ALT (SGPT) U/L 31 39* 43*     Results from last 7 days   Lab Units 10/12/21  0540 10/11/21  1105 10/10/21  0429 10/09/21  0541 10/08/21  0402 10/08/21  0402 10/07/21  0419 10/07/21  0419 10/06/21  0243 10/06/21  0243 10/05/21  1520 10/05/21  1520   CALCIUM mg/dL 8.3 9.0 8.4 8.8   < > 9.0   < > 8.8   < > 8.9   < > 9.5   ALBUMIN g/dL  --   --   --   --   --  3.40*  --  3.40*  --   --   --  4.00   MAGNESIUM mg/dL  --   --   --   --   --  2.1  --  2.2  --  2.0  --  2.1    < > = values in this interval not displayed.       COVID19   Date Value Ref Range Status   10/08/2021 Not Detected Not Detected - Ref. Range Final   10/05/2021 Not Detected Not Detected - Ref. Range Final     No results found for: HGBA1C, POCGLU    Adult Transthoracic Echo Complete w/ Color, Spectral and  Contrast if necessary per protocol  · Calculated left ventricular EF = 19.1% Estimated left ventricular EF was   in agreement with the calculated left ventricular EF. Left ventricular   systolic function is severely decreased with severe global hypokinesis.  · Left ventricular diastolic function is consistent with (grade II w/high   LAP) pseudonormalization. Elevated left atrial filling pressure.  · Moderately reduced right ventricular systolic function noted.  · Severe biatrial enlargement  · There are myxomatous changes of the mitral valve apparatus present.Mild   mitral valve regurgitation is present  · There is a moderate sized left pleural effusion.  · Atrial fibrillation was the predominant rhythm observed during the   procedure.       Scheduled Medications  apixaban, 2.5 mg, Oral, Q12H  aspirin, 81 mg, Oral, Daily  castor oil-balsam peru, 1 application, Topical, Q12H  famotidine, 20 mg, Oral, Daily  furosemide, 40 mg, Oral, Daily  metoprolol tartrate, 12.5 mg, Oral, Q12H  oxybutynin, 2.5 mg, Oral, BID  PARoxetine, 10 mg, Oral, Nightly  polyethylene glycol, 17 g, Oral, Daily  senna-docusate sodium, 2 tablet, Oral, Nightly  sodium chloride, 1 g, Oral, TID With Meals  vitamin B-12, 500 mcg, Oral, Daily    Infusions   Diet  Diet Soft Texture; Ground; Cardiac       Assessment/Plan     Active Hospital Problems    Diagnosis  POA   • **Acute deep vein thrombosis (DVT) of femoral vein of right lower extremity ([facility]) [I82.411]  Yes   • DNR (do not resuscitate) [Z66]  No   • NICM (nonischemic cardiomyopathy) ([facility]) [I42.8]  Yes   • Stage 3a chronic kidney disease ([facility]) [N18.31]  Yes   • Acute on chronic systolic CHF (congestive heart failure) ([facility]) [I50.23]  Yes   • Pleural effusion, bilateral [J90]  Yes   • V-tach ([facility]) [I47.2]  No   • New onset a-fib ([facility]) [I48.91]  Yes   • Bilateral impacted cerumen [H61.23]  Yes   • Coronary artery disease [I25.10]  Yes   • Benign essential HTN [I10]  Yes   • Chronic constipation  [K59.09]  Yes      Resolved Hospital Problems   No resolved problems to display.   Acute RLE DVT  - no PE on CTA chest  - continue eliquis    Acute on Chronic Systolic CHF  - responded well to diuresis, appears euvolemic  - continue on current regimen  - complicated by stage 3a CKD which is now stable    PAF  - new diagnosis this admission  - continue on metoprolol, eliquis    Hyperkalemia  - mild, stop potassium supplement    Hyponatremia  - she does appear euvolemic-I suspect she has some decreased solute intake and maybe SIADH component  - continue on salt tabs and increased lasix dose  - check urine studies  - encourage PO    Anxiety/Panic Disorder  - continue clonazepam and paxil    Eliquis (home med) for DVT prophylaxis.  DNR   Discussed with patient, nursing staff and care team on multidisciplinary rounds.  Anticipate discharge to SNU facility tomorrow.      Geraldo Welch MD  [facility]  10/12/21  11:07 EDT

## 2021-10-12 NOTE — PLAN OF CARE
Goal Outcome Evaluation:  Plan of Care Reviewed With: patient           Outcome Summary: Pt seen for PT treatment this AM. Pt very emotional and tearful throughout session making comments that she would prefer to stay at [facility] and die than be a burden to her family or go to rehab. PT spent at length time listening and attempting to encourage pt. Pt sat EOB for ~8 mins with supervision. She required mod A for supine<>sit this date as she was complaining of BLE pain. Pt states she needs assist with feeding herself but demonstrates appropriate active range of motion in BUE for self feeding. Pt may benefit from speaking with .

## 2021-10-12 NOTE — CASE MANAGEMENT/SOCIAL WORK
Continued Stay Note  [facility]     Patient Name: Shira Davila  MRN: [id number]  Today's Date: 10/12/2021    Admit Date: 10/5/2021     Discharge Plan     Row Name 10/12/21 1646       Plan    Plan [facility] for SNF.  Pt signed required doc by her Insurance company for SNF. Per [facility] precert submitted today. Will need ambulance transport    Patient/Family in Agreement with Plan yes    Plan Comments Pt's [facility] requires release of information form be signed prior to starting precert. Form signed and Faxed to [facility]. Per [facility], precert was submitted today. Plan to transfer once pre cert obtained. Conner Michel RN-BC               Discharge Codes    No documentation.               Expected Discharge Date and Time     Expected Discharge Date Expected Discharge Time    Oct 13, 2021             Conner Michel RN

## 2021-10-12 NOTE — THERAPY TREATMENT NOTE
Patient Name: Shira Davila  : 1921    MRN: [id number]                              Today's Date: 10/12/2021       Admit Date: 10/5/2021    Visit Dx:     ICD-10-CM ICD-9-CM   1. Acute congestive heart failure, unspecified heart failure type ([facility])  I50.9 428.0   2. Atrial fibrillation with rapid ventricular response ([facility])  I48.91 427.31   3. DVT, lower extremity, proximal, acute, right ([facility])  I82.4Y1 453.41     Patient Active Problem List   Diagnosis   • Abdominal pain, vomiting, and diarrhea   • Chronic constipation   • Hemorrhoid   • Arthralgia of hip   • LBP (low back pain)   • Disease of thyroid gland   • Benign essential HTN   • Chronic fatigue   • Chronic systolic CHF (congestive heart failure) ([facility])   • Coronary artery disease   • Colitis   • Bilateral impacted cerumen   • Anxiety   • Fungal infection   • Poor social situation   • Leg pain, anterior, right   • Poor mobility   • Weight loss   • Bilateral leg edema   • Stage 3a chronic kidney disease ([facility])   • Acute deep vein thrombosis (DVT) of femoral vein of right lower extremity ([facility])   • Acute on chronic systolic CHF (congestive heart failure) ([facility])   • Pleural effusion, bilateral   • V-tach ([facility])   • New onset a-fib ([facility])   • NICM (nonischemic cardiomyopathy) ([facility])   • DNR (do not resuscitate)     Past Medical History:   Diagnosis Date   • Anxiety    • Arthritis    • CHF (congestive heart failure) ([facility])    • Coronary artery disease    • Disease of thyroid gland 3/16/2016   • Diverticulitis    • Hypertension      Past Surgical History:   Procedure Laterality Date   • ABDOMINAL SURGERY      liban   • APPENDECTOMY     • COLON SURGERY      fistulectomy   • EYE SURGERY      cataract   • HYSTERECTOMY     • TONSILLECTOMY        General Information     Row Name 10/12/21 7903          Physical Therapy Time and Intention    Document Type therapy note (daily note)  -CF     Mode of Treatment physical therapy; individual therapy  -CF     Row Name 10/12/21 6013  "         General Information    Patient Profile Reviewed yes  -CF     Existing Precautions/Restrictions fall  -CF     Row Name 10/12/21 1315          Cognition    Orientation Status (Cognition) person; place  -CF     Row Name 10/12/21 1315          Safety Issues, Functional Mobility    Safety Issues Affecting Function (Mobility) insight into deficits/self-awareness; awareness of need for assistance; safety precautions follow-through/compliance; safety precaution awareness  -CF     Impairments Affecting Function (Mobility) balance; endurance/activity tolerance; cognition; pain; strength  -CF           User Key  (r) = Recorded By, (t) = Taken By, (c) = Cosigned By    Initials Name Provider Type    Vidya Delgado PT Physical Therapist               Mobility     Row Name 10/12/21 1315          Bed Mobility    Bed Mobility supine-sit; sit-supine  -CF     Supine-Sit [place] (Bed Mobility) minimum assist (75% patient effort); moderate assist (50% patient effort)  -CF     Sit-Supine [place] (Bed Mobility) moderate assist (50% patient effort); 1 person assist  -CF     Assistive Device (Bed Mobility) bed rails; head of bed elevated  -CF     Comment (Bed Mobility) Pt repeatedly stating \"I can't do this\" despite being able to perform with assist. supervision for balance at EOB  -CF     Row Name 10/12/21 1315          Transfers    Comment (Transfers) Not agreeable this date. Pt tearful and emotional throughout session. Pt holding PT's hand stating she \"wants to die here\" and asking if PT will stay with her. RN Boby notified  -CF           User Key  (r) = Recorded By, (t) = Taken By, (c) = Cosigned By    Initials Name Provider Type    Vidya Delgado PT Physical Therapist               Obj/Interventions     Row Name 10/12/21 1317          Motor Skills    Therapeutic Exercise other (see comments)  B laq x5 reps  -CF           User Key  (r) = Recorded By, (t) = Taken By, (c) = Cosigned By    Initials Name " Provider Type    CF Vidya Martinez, MOLLY Physical Therapist               Goals/Plan    No documentation.                Clinical Impression     Row Name 10/12/21 1317          Pain Scale: FACES Pre/Post-Treatment    Pain: FACES Scale, Pretreatment 2-->hurts little bit  -CF     Posttreatment Pain Rating 4-->hurts little more  -CF     Pre/Posttreatment Pain Comment LE pain with movement  -CF     Row Name 10/12/21 1317          Plan of Care Review    Plan of Care Reviewed With patient  -CF     Outcome Summary Pt seen for PT treatment this AM. Pt very emotional and tearful throughout session making comments that she would prefer to stay at [facility] and die than be a burden to her family or go to rehab. PT spent at length time listening and attempting to encourage pt. Pt sat EOB for ~8 mins with supervision. She required mod A for supine<>sit this date as she was complaining of BLE pain. Pt states she needs assist with feeding herself but demonstrates appropriate active range of motion in BUE for self feeding. Pt may benefit from speaking with .  -CF     Row Name 10/12/21 1317          Vital Signs    O2 Delivery Pre Treatment room air  -CF     O2 Delivery Intra Treatment room air  -CF     O2 Delivery Post Treatment room air  -CF     Row Name 10/12/21 1317          Positioning and Restraints    Pre-Treatment Position in bed  -CF     Post Treatment Position bed  -CF     In Bed notified nsg; call light within reach; encouraged to call for assist; exit alarm on; fowlers; side rails up x2; R multipodus; L multipodus; legs elevated  -CF           User Key  (r) = Recorded By, (t) = Taken By, (c) = Cosigned By    Initials Name Provider Type    CF Vidya Martinez, MOLLY Physical Therapist               Outcome Measures     Row Name 10/12/21 1321          How much help from another person do you currently need...    Turning from your back to your side while in flat bed without using bedrails? 3  -CF     Moving from lying  on back to sitting on the side of a flat bed without bedrails? 3  -CF     Moving to and from a bed to a chair (including a wheelchair)? 3  -CF     Standing up from a chair using your arms (e.g., wheelchair, bedside chair)? 3  -CF     Climbing 3-5 steps with a railing? 2  -CF     To walk in hospital room? 2  -CF     AM-PAC 6 Clicks Score (PT) 16  -CF     Row Name 10/12/21 1321          Functional Assessment    Outcome Measure Options AM-PAC 6 Clicks Basic Mobility (PT)  -CF           User Key  (r) = Recorded By, (t) = Taken By, (c) = Cosigned By    Initials Name Provider Type    CF Vidya Martinez, PT Physical Therapist                             Physical Therapy Education                 Title: PT OT SLP Therapies (In Progress)     Topic: Physical Therapy (In Progress)     Point: Mobility training (In Progress)     Learning Progress Summary           Patient Acceptance, E, NR by  at 10/12/2021 1322    Acceptance, E,TB, NR by  at 10/7/2021 1557    Acceptance, E, NR by CF at 10/6/2021 1512                   Point: Home exercise program (In Progress)     Learning Progress Summary           Patient Acceptance, E, NR by CF at 10/12/2021 1322                   Point: Body mechanics (In Progress)     Learning Progress Summary           Patient Acceptance, E, NR by CF at 10/12/2021 1322    Acceptance, E,TB, NR by  at 10/7/2021 1557                   Point: Precautions (Not Started)     Learner Progress:  Not documented in this visit.                      User Key     Initials Effective Dates Name Provider Type Discipline     06/16/21 -  Vidya Martinez, PT Physical Therapist PT    CS 08/30/21 -  Aimee Peña, MOLLY Student PT Student PT              PT Recommendation and Plan  Planned Therapy Interventions (PT): balance training, bed mobility training, gait training, patient/family education, strengthening, transfer training, ROM (range of motion)  Plan of Care Reviewed With: patient  Outcome Summary: Pt seen  for PT treatment this AM. Pt very emotional and tearful throughout session making comments that she would prefer to stay at [facility] and die than be a burden to her family or go to rehab. PT spent at length time listening and attempting to encourage pt. Pt sat EOB for ~8 mins with supervision. She required mod A for supine<>sit this date as she was complaining of BLE pain. Pt states she needs assist with feeding herself but demonstrates appropriate active range of motion in BUE for self feeding. Pt may benefit from speaking with .     Time Calculation:    PT Charges     Row Name 10/12/21 1208             Time Calculation    Start Time 1000  -CF      Stop Time 1025  -CF      Time Calculation (min) 25 min  -CF      PT Received On 10/12/21  -CF      PT - Next Appointment 10/13/21  -            User Key  (r) = Recorded By, (t) = Taken By, (c) = Cosigned By    Initials Name Provider Type    CF Vidya Martinez, MOLLY Physical Therapist              Therapy Charges for Today     Code Description Service Date Service Provider Modifiers Qty    [id number] HC PT THER PROC EA 15 MIN 10/12/2021 Vidya Martinez PT GP 2          PT G-Codes  Outcome Measure Options: AM-PAC 6 Clicks Basic Mobility (PT)  AM-PAC 6 Clicks Score (PT): 16  AM-PAC 6 Clicks Score (OT): 16    Vidya Martinez PT  10/12/2021

## 2021-10-13 LAB
ANION GAP SERPL CALCULATED.3IONS-SCNC: 4.9 MMOL/L (ref 5–15)
ANION GAP SERPL CALCULATED.3IONS-SCNC: 6.7 MMOL/L (ref 5–15)
ANION GAP SERPL CALCULATED.3IONS-SCNC: 9.4 MMOL/L (ref 5–15)
BUN SERPL-MCNC: 14 MG/DL (ref 8–23)
BUN SERPL-MCNC: 16 MG/DL (ref 8–23)
BUN SERPL-MCNC: 17 MG/DL (ref 8–23)
BUN/CREAT SERPL: 19.4 (ref 7–25)
BUN/CREAT SERPL: 21.9 (ref 7–25)
BUN/CREAT SERPL: 23.3 (ref 7–25)
CALCIUM SPEC-SCNC: 8.2 MG/DL (ref 8.2–9.6)
CALCIUM SPEC-SCNC: 8.3 MG/DL (ref 8.2–9.6)
CALCIUM SPEC-SCNC: 8.5 MG/DL (ref 8.2–9.6)
CHLORIDE SERPL-SCNC: 92 MMOL/L (ref 98–107)
CHLORIDE SERPL-SCNC: 94 MMOL/L (ref 98–107)
CHLORIDE SERPL-SCNC: 97 MMOL/L (ref 98–107)
CO2 SERPL-SCNC: 22.3 MMOL/L (ref 22–29)
CO2 SERPL-SCNC: 23.6 MMOL/L (ref 22–29)
CO2 SERPL-SCNC: 25.1 MMOL/L (ref 22–29)
CREAT SERPL-MCNC: 0.72 MG/DL (ref 0.57–1)
CREAT SERPL-MCNC: 0.73 MG/DL (ref 0.57–1)
CREAT SERPL-MCNC: 0.73 MG/DL (ref 0.57–1)
GFR SERPL CREATININE-BSD FRML MDRD: 73 ML/MIN/1.73
GFR SERPL CREATININE-BSD FRML MDRD: 73 ML/MIN/1.73
GFR SERPL CREATININE-BSD FRML MDRD: 75 ML/MIN/1.73
GLUCOSE SERPL-MCNC: 112 MG/DL (ref 65–99)
GLUCOSE SERPL-MCNC: 83 MG/DL (ref 65–99)
GLUCOSE SERPL-MCNC: 91 MG/DL (ref 65–99)
POTASSIUM SERPL-SCNC: 4.4 MMOL/L (ref 3.5–5.2)
POTASSIUM SERPL-SCNC: 4.6 MMOL/L (ref 3.5–5.2)
POTASSIUM SERPL-SCNC: 4.7 MMOL/L (ref 3.5–5.2)
SODIUM SERPL-SCNC: 123 MMOL/L (ref 136–145)
SODIUM SERPL-SCNC: 125 MMOL/L (ref 136–145)
SODIUM SERPL-SCNC: 127 MMOL/L (ref 136–145)
URATE SERPL-MCNC: 5 MG/DL (ref 2.4–5.7)

## 2021-10-13 PROCEDURE — 80048 BASIC METABOLIC PNL TOTAL CA: CPT | Performed by: INTERNAL MEDICINE

## 2021-10-13 PROCEDURE — 84550 ASSAY OF BLOOD/URIC ACID: CPT | Performed by: INTERNAL MEDICINE

## 2021-10-13 RX ORDER — TOLVAPTAN 15 MG/1
7.5 TABLET ORAL ONCE
Status: COMPLETED | OUTPATIENT
Start: 2021-10-13 | End: 2021-10-13

## 2021-10-13 RX ADMIN — Medication 500 MCG: at 08:42

## 2021-10-13 RX ADMIN — FUROSEMIDE 40 MG: 40 TABLET ORAL at 08:42

## 2021-10-13 RX ADMIN — OXYBUTYNIN CHLORIDE 2.5 MG: 5 TABLET ORAL at 20:35

## 2021-10-13 RX ADMIN — TOLVAPTAN 7.5 MG: 15 TABLET ORAL at 14:10

## 2021-10-13 RX ADMIN — CASTOR OIL AND BALSAM, PERU 1 APPLICATION: 788; 87 OINTMENT TOPICAL at 15:42

## 2021-10-13 RX ADMIN — METOPROLOL TARTRATE 12.5 MG: 25 TABLET, FILM COATED ORAL at 08:42

## 2021-10-13 RX ADMIN — PAROXETINE HYDROCHLORIDE HEMIHYDRATE 10 MG: 10 TABLET, FILM COATED ORAL at 20:35

## 2021-10-13 RX ADMIN — APIXABAN 2.5 MG: 2.5 TABLET, FILM COATED ORAL at 20:35

## 2021-10-13 RX ADMIN — SODIUM CHLORIDE TAB 1 GM 2 G: 1 TAB at 08:41

## 2021-10-13 RX ADMIN — CASTOR OIL AND BALSAM, PERU 1 APPLICATION: 788; 87 OINTMENT TOPICAL at 20:35

## 2021-10-13 RX ADMIN — FAMOTIDINE 20 MG: 20 TABLET, FILM COATED ORAL at 08:42

## 2021-10-13 RX ADMIN — OXYBUTYNIN CHLORIDE 2.5 MG: 5 TABLET ORAL at 08:42

## 2021-10-13 RX ADMIN — METOPROLOL TARTRATE 12.5 MG: 25 TABLET, FILM COATED ORAL at 20:35

## 2021-10-13 RX ADMIN — ASPIRIN 81 MG: 81 TABLET, CHEWABLE ORAL at 08:42

## 2021-10-13 RX ADMIN — APIXABAN 2.5 MG: 2.5 TABLET, FILM COATED ORAL at 08:42

## 2021-10-13 NOTE — CASE MANAGEMENT/SOCIAL WORK
Continued Stay Note  [facility]     Patient Name: Shira Davila  MRN: [id number]  Today's Date: 10/13/2021    Admit Date: 10/5/2021     Discharge Plan     Row Name 10/13/21 1108       Plan    Plan [facility]. Pre cert obtained. Needs ambulance transport.    Patient/Family in Agreement with Plan yes    Plan Comments Precret obtained and per [facility], can accept today. Discussed with DR Welch and Na+ 125 today, nephrology consulted. Poss D/C tomorrow if Na improved. Conner Michel RN-BC               Discharge Codes    No documentation.               Expected Discharge Date and Time     Expected Discharge Date Expected Discharge Time    Oct 14, 2021             Conner Michel RN

## 2021-10-13 NOTE — CONSULTS
[facility]                                                                                             Nephrology Initial Consult Note    Patient Identification:  Name: Shira Davila MRN: [id number]  Age: 89 y.o. : 1921  Sex: female  Date:10/13/2021    Requesting Physician: As per consult order.  Reason for Consultation: Worsening hyponatremia  Information from:patient/ family/ chart      History of Present Illness: This is a 89 y.o. year old female who has a history of stage III chronic kidney disease, no prior nephrology evaluation, baseline creatinine averages around 1.1.  Creatinine 1.2 on admission and that is improved down to 0.72 today.  She has had a few low sodium readings over the past several years but her average level is around 1 5-138.  Her sodium was 139 on admission and has steadily dropped down to 125 today.  She turns 89 years old in less than a month and does have a history of CHF, admitted with new onset atrial fibrillation, DVT and has a history of hypertension.  DVT has been treated with Lovenox.  She has been diuresed with IV Lasix and has improved per notes.  Currently on Eliquis and aspirin.  Lasix has been transition to oral and she was started on salt tablets on 10/11    The following medical history and medications personally reviewed by me:    Problem List:   Patient Active Problem List    Diagnosis    • *Acute deep vein thrombosis (DVT) of femoral vein of right lower extremity ([facility]) [I82.411]    • DNR (do not resuscitate) [Z66]    • NICM (nonischemic cardiomyopathy) ([facility]) [I42.8]    • Stage 3a chronic kidney disease ([facility]) [N18.31]    • Acute on chronic systolic CHF (congestive heart failure) ([facility]) [I50.23]    • Pleural effusion, bilateral [J90]    • V-tach ([facility]) [I47.2]    • New onset a-fib ([facility]) [I48.91]    • Poor mobility [Z74.09]    • Weight loss [R63.4]    •  Bilateral leg edema [R60.0]    • Fungal infection [B49]    • Poor social situation [Z65.9]    • Leg pain, anterior, right [M79.604]    • Anxiety [F41.9]    • Bilateral impacted cerumen [H61.23]    • Colitis [K52.9]    • Coronary artery disease [I25.10]    • Benign essential HTN [I10]    • Chronic fatigue [R53.82]    • Chronic systolic CHF (congestive heart failure) (HCC) [I50.22]    • Abdominal pain, vomiting, and diarrhea [R10.9, R11.10, R19.7]    • Chronic constipation [K59.09]    • Hemorrhoid [K64.9]    • Arthralgia of hip [M25.559]    • LBP (low back pain) [M54.50]    • Disease of thyroid gland [E07.9]        Past Medical History:  Past Medical History:   Diagnosis Date   • Anxiety    • Arthritis    • CHF (congestive heart failure) (HCC)    • Coronary artery disease    • Disease of thyroid gland 3/16/2016   • Diverticulitis    • Hypertension        Past Surgical History:  Past Surgical History:   Procedure Laterality Date   • ABDOMINAL SURGERY      liban   • APPENDECTOMY     • COLON SURGERY      fistulectomy   • EYE SURGERY      cataract   • HYSTERECTOMY     • TONSILLECTOMY          Home Meds:   Medications Prior to Admission   Medication Sig Dispense Refill Last Dose   • aspirin 81 MG tablet Take 81 mg by mouth Daily.   Past Week at Unknown time   • furosemide (LASIX) 20 MG tablet Take 1 tablet by mouth Daily. 90 tablet 6 Past Week at Unknown time   • vitamin B-12 (CYANOCOBALAMIN) 500 MCG tablet Take 500 mcg by mouth Daily.   Past Week at Unknown time   • cephalexin (Keflex) 500 MG capsule Take 1 capsule by mouth 3 (Three) Times a Day. 21 capsule 0 Unknown at Unknown time   • clonazePAM (KlonoPIN) 0.5 MG tablet Take 1 tablet by mouth Daily As Needed for Anxiety. 30 tablet 3 Unknown at Unknown time   • nystatin-zinc oxide Apply to bid to skin 100 g 11    • potassium chloride (K-DUR,KLOR-CON) 10 MEQ CR tablet Take 1 tablet by mouth 2 (Two) Times a Day. 60 tablet 3 Unknown at Unknown time   • pyridoxine (B6  NATURAL) 100 MG tablet Take 100 mg by mouth Daily.   Unknown at Unknown time   • solifenacin (VESICARE) 5 MG tablet TAKE 1 TABLET BY MOUTH EVERY DAY 90 tablet 1 Unknown at Unknown time       Current Meds:   Current Facility-Administered Medications   Medication Dose Route Frequency Provider Last Rate Last Admin   • acetaminophen (TYLENOL) tablet 650 mg  650 mg Oral Q4H PRN Brandon Fuentes APRN   650 mg at 10/09/21 1245   • apixaban (ELIQUIS) tablet 2.5 mg  2.5 mg Oral Q12H Sabi Harden MD   2.5 mg at 10/13/21 0842   • aspirin chewable tablet 81 mg  81 mg Oral Daily Brandon Fuentes APRN   81 mg at 10/13/21 0842   • castor oil-balsam peru (VENELEX) ointment 1 application  1 application Topical Q12H Jose Armando Wayne MD   1 application at 10/12/21 2121   • clonazePAM (KlonoPIN) tablet 0.5 mg  0.5 mg Oral BID PRN Sabi Harden MD   0.5 mg at 10/10/21 2015   • famotidine (PEPCID) tablet 20 mg  20 mg Oral Daily Jose Armando Wayne MD   20 mg at 10/13/21 0842   • furosemide (LASIX) tablet 40 mg  40 mg Oral Daily Geraldo Welch MD   40 mg at 10/13/21 0842   • melatonin tablet 3 mg  3 mg Oral Nightly PRN Brandon Fuentes APRN   3 mg at 10/10/21 2058   • metoprolol tartrate (LOPRESSOR) tablet 12.5 mg  12.5 mg Oral Q12H Geraldo Welch MD   12.5 mg at 10/13/21 0842   • nitroglycerin (NITROSTAT) SL tablet 0.4 mg  0.4 mg Sublingual Q5 Min PRN Brandon Fuentes APRN       • ondansetron (ZOFRAN) tablet 4 mg  4 mg Oral Q6H PRN Brandon Fuentes APRN        Or   • ondansetron (ZOFRAN) injection 4 mg  4 mg Intravenous Q6H PRN Brandon Fuentes APRN       • oxybutynin (DITROPAN) tablet 2.5 mg  2.5 mg Oral BID Geraldo Welch MD   2.5 mg at 10/13/21 0842   • PARoxetine (PAXIL) tablet 10 mg  10 mg Oral Nightly Jose Armando Wayne MD   10 mg at 10/12/21 2120   • polyethylene glycol (MIRALAX) packet 17 g  17 g Oral Daily Jose Armando Wayne MD   17 g at 10/12/21 0928   • sennosides-docusate (PERICOLACE) 8.6-50 MG per  tablet 2 tablet  2 tablet Oral Nightly Jose Armando Wayne MD   2 tablet at 10/12/21 2121   • sodium chloride 0.9 % flush 10 mL  10 mL Intravenous PRN Prosper Helton MD   10 mL at 10/09/21 2011   • sodium chloride tablet 2 g  2 g Oral TID With Meals Geraldo Welch MD   2 g at 10/13/21 0841   • vitamin B-12 (CYANOCOBALAMIN) tablet 500 mcg  500 mcg Oral Daily Jose Armando Wayne MD   500 mcg at 10/13/21 0842       Allergies:  Allergies   Allergen Reactions   • Cephalexin Diarrhea   • Atorvastatin Calcium Unknown - Low Severity   • Sesame Oil Unknown - Low Severity   • Amoxicillin Unknown - Low Severity   • Cortisone Unknown - Low Severity   • Diazepam Unknown - Low Severity   • Doxycycline Rash   • Erythromycin Unknown - Low Severity   • Horse-Derived Products Unknown - Low Severity   • Levoxyl [Levothyroxine Sodium] Unknown - Low Severity   • Lexapro [Escitalopram Oxalate] Unknown - Low Severity   • Lipitor [Atorvastatin] Unknown - Low Severity   • Lopressor [Metoprolol Tartrate] Unknown - Low Severity   • Metaxalone Unknown - Low Severity   • Omeprazole Unknown - Low Severity   • Penicillins Unknown - Low Severity     AND ALL RELATED    • Sesame Seed (Diagnostic) Unknown - Low Severity   • Solarcaine [Benzocaine] Unknown - Low Severity   • Sulfa Antibiotics Unknown - Low Severity       Social History:   Social History     Socioeconomic History   • Marital status: Single   Tobacco Use   • Smoking status: Never Smoker   • Smokeless tobacco: Never Used   Substance and Sexual Activity   • Alcohol use: No   • Drug use: No   • Sexual activity: Defer        Family History:  Family History   Problem Relation Age of Onset   • Hypertension Other         Review of Systems: as per HPI, in addition:    General:      Denies weakness / fatigue,                       No fevers / chills                       no weight loss  HEENT:       no dysphagia / odynophagia  Neck:           normal range of motion, no swelling  Respiratory: no  "cough / congestion                      Improved shortness of air                       No wheezing  CV:              No chest pain                       No palpitations  Abdomen/GI: no nausea / vomiting                      No diarrhea / constipation                      No abdominal pain  :             no dysuria / urinary frequency                       No urgency, normal output  Endocrine:   no polyuria / polydipsia,                      No heat or cold intolerance  Skin:           no rashes or skin breakdown   Vascular:   Decreased edema                     No claudication  Psych:        no depression/ anxiety  Neuro:        no focal weakness, no seizures  Musculoskeletal: no joint pain or deformities      Physical Exam:  Vitals:   Temp (24hrs), Av.5 °F (36.4 °C), Min:97.4 °F (36.3 °C), Max:97.6 °F (36.4 °C)    /70 (BP Location: Right arm, Patient Position: Lying)   Pulse 57   Temp 97.6 °F (36.4 °C) (Oral)   Resp 16   Ht 162.6 cm (64\")   Wt 64.5 kg (142 lb 3.2 oz)   SpO2 96%   BMI 24.41 kg/m²   Intake/Output:     Intake/Output Summary (Last 24 hours) at 10/13/2021 1120  Last data filed at 10/13/2021 0700  Gross per 24 hour   Intake 820 ml   Output 450 ml   Net 370 ml        Wt Readings from Last 1 Encounters:   10/13/21 0602 64.5 kg (142 lb 3.2 oz)   10/12/21 0515 64.6 kg (142 lb 6.7 oz)   10/11/21 0652 53 kg (116 lb 13.5 oz)   10/10/21 0537 52 kg (114 lb 10.2 oz)   10/09/21 0634 54 kg (119 lb 0.8 oz)   10/08/21 0500 56 kg (123 lb 7.3 oz)   10/07/21 1006 55.8 kg (123 lb)   10/07/21 0630 56 kg (123 lb 7.3 oz)   10/05/21 2222 53.7 kg (118 lb 6.2 oz)   10/05/21 2118 58.5 kg (129 lb)       Exam:    General Appearance:  Awake, alert, oriented x3, no acute distress  Chronically ill-appearing   Head and Face:  Normocephalic, atraumatic, mucus membranes moist, oropharynx clear   Eyes:  No icterus, pupils equal round and reactive to light, extraocular movements intact    ENMT: Moist mucosa, tongue " symmetric    Neck: Supple  no jugular venous distention  no thyromegaly   Pulmonary:  Respiratory effort: Normal  Auscultation of lungs: Clear bilaterally  No wheezes  No rhonchi  Good air movement, good expansion   Chest wall:  No tenderness or deformity   Cardiovascular:  Auscultation of the heart: Normal rhythm, no murmurs  Trace edema of bilateral lower extremities   Abdomen:  Abdomen: soft, non-tender, normal bowel sounds all four quadrants, no masses   Liver and spleen: no hepatosplenomegaly   Musculoskeletal: Digits and nails: normal  Normal range of motion  No joint swelling or gross deformities    Skin: Skin inspection: color normal, no visible rashes or lesions  Skin palpation: texture, turgor normal, no palpable lesions   Lymphatic:  no cervical lymphadenopathy    Psychiatric: Judgement and insight: normal  Orientation to person place and time: normal  Mood and affect: normal       DATA:  Radiology and Labs:  The following labs independently reviewed by me, additional AM labs ordered  Old records independently reviewed showing baseline creatinine 1.1, CKD 2-3, normal baseline sodium levels  The following radiologic studies independently viewed by me, findings CT PE protocol showed no PE, effusions present.  Chest x-ray confirmed the effusions, no significant pulmonary edema was noted  Interval notes, chart personally reviewed by me.  I have reviewed and summarized old records as detailed above  Plan of care discussed with patient/family  New problems include worsening hyponatremia         Risk/ complexity of medical care/ medical decision making: High risk with worsening hyponatremia  Chronic illness with severe exacerbation or progression      Labs:   Recent Results (from the past 24 hour(s))   Sodium, Urine, Random - Urine, Clean Catch    Collection Time: 10/12/21  1:03 PM    Specimen: Urine, Clean Catch   Result Value Ref Range    Sodium, Urine 111 mmol/L   Urea Nitrogen, Urine - Urine, Clean Catch     Collection Time: 10/12/21  1:03 PM    Specimen: Urine, Clean Catch   Result Value Ref Range    Urea Nitrogen, Urine 402 mg/dL   Creatinine, Urine, Random - Urine, Clean Catch    Collection Time: 10/12/21  1:03 PM    Specimen: Urine, Clean Catch   Result Value Ref Range    Creatinine, Urine 35.9 mg/dL   Osmolality, Urine - Urine, Clean Catch    Collection Time: 10/12/21  1:03 PM    Specimen: Urine, Clean Catch   Result Value Ref Range    Osmolality, Urine 485 mOsm/kg   Basic Metabolic Panel    Collection Time: 10/13/21  4:21 AM    Specimen: Blood   Result Value Ref Range    Glucose 91 65 - 99 mg/dL    BUN 14 8 - 23 mg/dL    Creatinine 0.72 0.57 - 1.00 mg/dL    Sodium 125 (L) 136 - 145 mmol/L    Potassium 4.4 3.5 - 5.2 mmol/L    Chloride 92 (L) 98 - 107 mmol/L    CO2 23.6 22.0 - 29.0 mmol/L    Calcium 8.2 8.2 - 9.6 mg/dL    eGFR Non African Amer 75 >60 mL/min/1.73    BUN/Creatinine Ratio 19.4 7.0 - 25.0    Anion Gap 9.4 5.0 - 15.0 mmol/L   Uric Acid    Collection Time: 10/13/21  4:21 AM    Specimen: Blood   Result Value Ref Range    Uric Acid 5.0 2.4 - 5.7 mg/dL       Radiology:  Imaging Results (Last 24 Hours)     ** No results found for the last 24 hours. **               ASSESSMENT:   Worsening hyponatremia, likely related to her underlying decompensated CHF.  She is not received any thiazide diuretics, SSRIs or IV fluids.  Currently on salt tabs but sodium is unchanged today.  TSH was normal  CKD stage IIIa, improved, below baseline  Transient hyperkalemia, improved    Acute deep vein thrombosis (DVT) of femoral vein of right lower extremity (HCC)    Chronic constipation    Benign essential HTN    Coronary artery disease    Bilateral impacted cerumen    Stage 3a chronic kidney disease (HCC)    Acute on chronic systolic CHF (congestive heart failure) (HCC)    Pleural effusion, bilateral    V-tach (HCC)    New onset a-fib (HCC)    NICM (nonischemic cardiomyopathy) (HCC)    DNR (do not resuscitate)      PLAN:    Worsening hyponatremia likely related to her underlying CHF though urine studies 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  May need additional dosing tomorrow depending on sodium trends  I am concerned the salt tabs may exacerbate her CHF so would hold for now  Okay to continue oral Lasix    Continue to monitor electrolytes and volume closely    I appreciate the consult request, please call if any questions      Boby Alexander M.D  [facility]  Office phone number: [phone]  Answering service phone number: [phone]        10/13/2021        Dictation via Dragon dictation software

## 2021-10-13 NOTE — SIGNIFICANT NOTE
10/13/21 0908   OTHER   Discipline physical therapist   Rehab Time/Intention   Session Not Performed patient/family declined treatment; other (see comments)  (Pt adamently declined treatment this morning. Pt appears agitated and states she does not want to move. Offered to assist pt to sit EOB and eat a few bites of breakfast but pt continued to refuse. Will check back in PM if time allows.)   Recommendation   PT - Next Appointment 10/14/21

## 2021-10-13 NOTE — PROGRESS NOTES
Name: Shira Davila ADMIT: 10/5/2021   : 1921  PCP: Aletha Rincon MD    MRN: [id number] LOS: 8 days   AGE/SEX: 89 y.o. female  ROOM: [facility]/     Subjective   Subjective   CC: generalized weakness  No acute events. Patient has no new complaints. Taking some PO including Boost shakes. No CP/dyspnea/f/c/n/v/d.    Objective   Objective   Vital Signs  Temp:  [97.4 °F (36.3 °C)-97.6 °F (36.4 °C)] 97.6 °F (36.4 °C)  Heart Rate:  [50-75] 57  Resp:  [16] 16  BP: (129-161)/(64-81) 141/70  SpO2:  [91 %-98 %] 96 %  on  Flow (L/min):  [1] 1;   Device (Oxygen Therapy): nasal cannula  Body mass index is 24.41 kg/m².  Physical Exam  Vitals and nursing note reviewed.   Constitutional:       General: She is not in acute distress.     Appearance: She is not toxic-appearing or diaphoretic.      Comments: Elderly, frail   HENT:      Head: Normocephalic and atraumatic.      Nose: Nose normal.      Mouth/Throat:      Mouth: Mucous membranes are moist.      Pharynx: Oropharynx is clear.   Eyes:      Extraocular Movements: Extraocular movements intact.      Conjunctiva/sclera: Conjunctivae normal.      Pupils: Pupils are equal, round, and reactive to light.   Cardiovascular:      Rate and Rhythm: Normal rate and regular rhythm.      Pulses: Normal pulses.   Pulmonary:      Effort: Pulmonary effort is normal.      Breath sounds: Normal breath sounds.   Abdominal:      General: Bowel sounds are normal.      Palpations: Abdomen is soft.      Tenderness: There is no abdominal tenderness.   Musculoskeletal:         General: No swelling or tenderness.      Cervical back: Normal range of motion and neck supple.   Skin:     General: Skin is warm and dry.      Capillary Refill: Capillary refill takes less than 2 seconds.   Neurological:      General: No focal deficit present.      Mental Status: She is alert.   Psychiatric:         Mood and Affect: Mood is anxious.         Behavior: Behavior normal.     Results Review     I reviewed  the patient's new clinical results.  I reviewed the patient's telemetry.  Results from last 7 days   Lab Units 10/10/21  0429 10/09/21  0541 10/08/21  0402 10/07/21  0419   WBC 10*3/mm3 6.47 7.24 6.13 5.59   HEMOGLOBIN g/dL 12.6 12.0 12.4 12.3   PLATELETS 10*3/mm3 191 185 199 180     Results from last 7 days   Lab Units 10/13/21  0421 10/12/21  0540 10/11/21  1105 10/10/21  0429   SODIUM mmol/L 125* 125* 127* 127*   POTASSIUM mmol/L 4.4 4.6 5.3* 5.0   CHLORIDE mmol/L 92* 93* 93* 98   CO2 mmol/L 23.6 23.2 21.7* 21.9*   BUN mg/dL 14 16 18 17   CREATININE mg/dL 0.72 0.71 1.02* 0.87   GLUCOSE mg/dL 91 95 116* 86   Estimated Creatinine Clearance: 39 mL/min (by C-G formula based on SCr of 0.72 mg/dL).  Results from last 7 days   Lab Units 10/08/21  0402 10/07/21  0419   ALBUMIN g/dL 3.40* 3.40*   BILIRUBIN mg/dL 0.3 0.5   ALK PHOS U/L 64 65   AST (SGOT) U/L 24 34*   ALT (SGPT) U/L 31 39*     Results from last 7 days   Lab Units 10/13/21  0421 10/12/21  0540 10/11/21  1105 10/10/21  0429 10/09/21  0541 10/08/21  0402 10/07/21  0419 10/07/21  0419   CALCIUM mg/dL 8.2 8.3 9.0 8.4   < > 9.0   < > 8.8   ALBUMIN g/dL  --   --   --   --   --  3.40*  --  3.40*   MAGNESIUM mg/dL  --   --   --   --   --  2.1  --  2.2    < > = values in this interval not displayed.       COVID19   Date Value Ref Range Status   10/08/2021 Not Detected Not Detected - Ref. Range Final   10/05/2021 Not Detected Not Detected - Ref. Range Final     No results found for: HGBA1C, POCGLU    Adult Transthoracic Echo Complete w/ Color, Spectral and Contrast if necessary per protocol  · Calculated left ventricular EF = 19.1% Estimated left ventricular EF was   in agreement with the calculated left ventricular EF. Left ventricular   systolic function is severely decreased with severe global hypokinesis.  · Left ventricular diastolic function is consistent with (grade II w/high   LAP) pseudonormalization. Elevated left atrial filling pressure.  · Moderately  reduced right ventricular systolic function noted.  · Severe biatrial enlargement  · There are myxomatous changes of the mitral valve apparatus present.Mild   mitral valve regurgitation is present  · There is a moderate sized left pleural effusion.  · Atrial fibrillation was the predominant rhythm observed during the   procedure.       Scheduled Medications  apixaban, 2.5 mg, Oral, Q12H  aspirin, 81 mg, Oral, Daily  castor oil-balsam peru, 1 application, Topical, Q12H  famotidine, 20 mg, Oral, Daily  furosemide, 40 mg, Oral, Daily  metoprolol tartrate, 12.5 mg, Oral, Q12H  oxybutynin, 2.5 mg, Oral, BID  PARoxetine, 10 mg, Oral, Nightly  polyethylene glycol, 17 g, Oral, Daily  senna-docusate sodium, 2 tablet, Oral, Nightly  tolvaptan, 7.5 mg, Oral, Once  vitamin B-12, 500 mcg, Oral, Daily    Infusions   Diet  Diet Soft Texture; Ground; Cardiac       Assessment/Plan     Active Hospital Problems    Diagnosis  POA   • **Acute deep vein thrombosis (DVT) of femoral vein of right lower extremity ([facility]) [I82.411]  Yes   • DNR (do not resuscitate) [Z66]  No   • NICM (nonischemic cardiomyopathy) ([facility]) [I42.8]  Yes   • Stage 3a chronic kidney disease ([facility]) [N18.31]  Yes   • Acute on chronic systolic CHF (congestive heart failure) ([facility]) [I50.23]  Yes   • Pleural effusion, bilateral [J90]  Yes   • V-tach ([facility]) [I47.2]  No   • New onset a-fib ([facility]) [I48.91]  Yes   • Bilateral impacted cerumen [H61.23]  Yes   • Coronary artery disease [I25.10]  Yes   • Benign essential HTN [I10]  Yes   • Chronic constipation [K59.09]  Yes      Resolved Hospital Problems   No resolved problems to display.   Acute RLE DVT  - no PE on CTA chest  - continue eliquis    Acute on Chronic Systolic CHF  - responded well to diuresis, appears euvolemic  - continue on current regimen  - complicated by stage 3a CKD which is now stable    PAF  - new diagnosis this admission  - continue on metoprolol, eliquis    Hyponatremia  - she does appear euvolemic-urine  studies consistent with SIADH  - on lasix, salt tabs stopped  - samsca started per nephrology, appreciate recs    Anxiety/Panic Disorder  - continue clonazepam and paxil    Eliquis (home med) for DVT prophylaxis.  DNR.  Discussed with patient, nursing staff and care team on multidisciplinary rounds.  Anticipate discharge to SNU facility tomorrow.      Geraldo Welch MD  [facility]  10/13/21  12:28 EDT

## 2021-10-13 NOTE — PLAN OF CARE
Goal Outcome Evaluation:   Sodium still down today, nephrology consulted, given dose of Samsca, neuro checks q4h, NO fluid restriction, strict intake and output. Pt tearful and anxious most of the day.

## 2021-10-13 NOTE — PLAN OF CARE
Goal Outcome Evaluation:    Progress: improving  Outcome Summary: VSS. No c/o pain or nausea. Pt slept for most of the night. Mepilex and barrier cream applied to sacrum. Turned q2hrs. Potential discharge today to [facility], waiting on precert. Will continue to monitor.

## 2021-10-14 VITALS
BODY MASS INDEX: 19.76 KG/M2 | WEIGHT: 115.74 LBS | HEART RATE: 76 BPM | SYSTOLIC BLOOD PRESSURE: 137 MMHG | TEMPERATURE: 97.8 F | DIASTOLIC BLOOD PRESSURE: 82 MMHG | RESPIRATION RATE: 18 BRPM | OXYGEN SATURATION: 93 % | HEIGHT: 64 IN

## 2021-10-14 PROBLEM — E22.2 SIADH (SYNDROME OF INAPPROPRIATE ADH PRODUCTION) (HCC): Status: ACTIVE | Noted: 2021-10-14

## 2021-10-14 PROBLEM — E87.1 HYPONATREMIA: Status: ACTIVE | Noted: 2021-10-14

## 2021-10-14 LAB
ANION GAP SERPL CALCULATED.3IONS-SCNC: 7.1 MMOL/L (ref 5–15)
BUN SERPL-MCNC: 17 MG/DL (ref 8–23)
BUN/CREAT SERPL: 24.3 (ref 7–25)
CALCIUM SPEC-SCNC: 8.5 MG/DL (ref 8.2–9.6)
CHLORIDE SERPL-SCNC: 97 MMOL/L (ref 98–107)
CO2 SERPL-SCNC: 24.9 MMOL/L (ref 22–29)
CREAT SERPL-MCNC: 0.7 MG/DL (ref 0.57–1)
GFR SERPL CREATININE-BSD FRML MDRD: 77 ML/MIN/1.73
GLUCOSE SERPL-MCNC: 89 MG/DL (ref 65–99)
POTASSIUM SERPL-SCNC: 4.8 MMOL/L (ref 3.5–5.2)
SARS-COV-2 RNA RESP QL NAA+PROBE: NOT DETECTED
SODIUM SERPL-SCNC: 129 MMOL/L (ref 136–145)

## 2021-10-14 PROCEDURE — U0003 INFECTIOUS AGENT DETECTION BY NUCLEIC ACID (DNA OR RNA); SEVERE ACUTE RESPIRATORY SYNDROME CORONAVIRUS 2 (SARS-COV-2) (CORONAVIRUS DISEASE [COVID-19]), AMPLIFIED PROBE TECHNIQUE, MAKING USE OF HIGH THROUGHPUT TECHNOLOGIES AS DESCRIBED BY CMS-2020-01-R: HCPCS | Performed by: INTERNAL MEDICINE

## 2021-10-14 PROCEDURE — 80048 BASIC METABOLIC PNL TOTAL CA: CPT | Performed by: INTERNAL MEDICINE

## 2021-10-14 RX ORDER — POLYETHYLENE GLYCOL 3350 17 G/17G
17 POWDER, FOR SOLUTION ORAL DAILY
Start: 2021-10-14

## 2021-10-14 RX ORDER — FUROSEMIDE 40 MG/1
40 TABLET ORAL DAILY
Start: 2021-10-15

## 2021-10-14 RX ORDER — CLONAZEPAM 0.5 MG/1
0.5 TABLET ORAL 2 TIMES DAILY PRN
Qty: 6 TABLET | Refills: 0 | Status: ON HOLD | OUTPATIENT
Start: 2021-10-14 | End: 2021-11-04 | Stop reason: SDUPTHER

## 2021-10-14 RX ORDER — AMOXICILLIN 250 MG
2 CAPSULE ORAL NIGHTLY
Start: 2021-10-14

## 2021-10-14 RX ORDER — FAMOTIDINE 20 MG/1
20 TABLET, FILM COATED ORAL DAILY
Start: 2021-10-15

## 2021-10-14 RX ORDER — PAROXETINE 10 MG/1
10 TABLET, FILM COATED ORAL NIGHTLY
Start: 2021-10-14

## 2021-10-14 RX ADMIN — FUROSEMIDE 40 MG: 40 TABLET ORAL at 09:33

## 2021-10-14 RX ADMIN — METOPROLOL TARTRATE 12.5 MG: 25 TABLET, FILM COATED ORAL at 09:34

## 2021-10-14 RX ADMIN — APIXABAN 2.5 MG: 2.5 TABLET, FILM COATED ORAL at 09:39

## 2021-10-14 RX ADMIN — OXYBUTYNIN CHLORIDE 2.5 MG: 5 TABLET ORAL at 09:38

## 2021-10-14 RX ADMIN — Medication 500 MCG: at 09:38

## 2021-10-14 RX ADMIN — ASPIRIN 81 MG: 81 TABLET, CHEWABLE ORAL at 09:39

## 2021-10-14 RX ADMIN — CASTOR OIL AND BALSAM, PERU 1 APPLICATION: 788; 87 OINTMENT TOPICAL at 09:40

## 2021-10-14 RX ADMIN — POLYETHYLENE GLYCOL 3350 17 G: 17 POWDER, FOR SOLUTION ORAL at 09:40

## 2021-10-14 RX ADMIN — FAMOTIDINE 20 MG: 20 TABLET, FILM COATED ORAL at 09:39

## 2021-10-14 NOTE — CASE MANAGEMENT/SOCIAL WORK
Case Management Discharge Note      Final Note: Skilled bed at [place] via  EMS. Yuliya Neal CSW    Provided Post Acute Provider List?: N/A  Refused Provider List Comment: Kyree Mulligan ([facility]afa)  Provided Post Acute Provider Quality & Resource List?: N/A    Selected Continued Care - Discharged on 10/14/2021 Admission date: 10/5/2021 - Discharge disposition: Skilled Nursing Facility (DC - External)    Destination Coordination complete.    Service Provider Selected Services Address Phone Fax Patient Preferred    [facility]  Skilled Nursing [address] [address] [phone] --          Durable Medical Equipment    No services have been selected for the patient.              Dialysis/Infusion    No services have been selected for the patient.              Home Medical Care Coordination complete.    Service Provider Selected Services Address Phone Fax Patient Preferred    [facility] [address] [address] [phone] --          Therapy    No services have been selected for the patient.              Community Resources    No services have been selected for the patient.              Community & DME    No services have been selected for the patient.                       Final Discharge Disposition Code: 03 - skilled nursing facility (SNF)

## 2021-10-14 NOTE — DISCHARGE SUMMARY
Date of Admission: 10/5/2021  Date of Discharge:  10/14/2021  Primary Care Physician: Aletha Rincon MD     Discharge Diagnosis:  Active Hospital Problems    Diagnosis  POA   • **Acute deep vein thrombosis (DVT) of femoral vein of right lower extremity ([facility]) [I82.411]  Yes   • Hyponatremia [E87.1]  Yes   • SIADH (syndrome of inappropriate ADH production) ([facility]) [E22.2]  Yes   • DNR (do not resuscitate) [Z66]  No   • NICM (nonischemic cardiomyopathy) ([facility]) [I42.8]  Yes   • Stage 3a chronic kidney disease ([facility]) [N18.31]  Yes   • Acute on chronic systolic CHF (congestive heart failure) ([facility]) [I50.23]  Yes   • Pleural effusion, bilateral [J90]  Yes   • V-tach ([facility]) [I47.2]  No   • New onset a-fib ([facility]) [I48.91]  Yes   • Bilateral impacted cerumen [H61.23]  Yes   • Coronary artery disease [I25.10]  Yes   • Benign essential HTN [I10]  Yes   • Chronic constipation [K59.09]  Yes      Resolved Hospital Problems   No resolved problems to display.       Presenting Problem/History of Present Illness:  Atrial fibrillation with rapid ventricular response ([facility]) [I48.91]  DVT, lower extremity, proximal, acute, right ([facility]) [I82.4Y1]  Acute congestive heart failure, unspecified heart failure type ([facility]) [I50.9]     Hospital Course:  The patient is a 89 y.o. female with a history of chronic systolic CHF, CAD, and HTN that presented to [facility] with a week of painful RLE edema. Please see admission H&P for further details. She was found to have a RLE DVT and was started on anticoagulation with eliquis which will be continued at discharge. A CT angiogram of the chest was checked and showed no pulmonary embolism. She was also found to be in acute exacerbation of chronic systolic CHF and new-onset atrial fibrillation. She was started on diuresis to which she responded well. She was transitioned to oral lasix. She developed hyponatremia despite achieving euvolemia and urine studies were consistent with SIADH. Increased  "lasix and salt tabs were ineffective. Nephrology evaluated the patient and treated with samsca with good response. Her appetite improved following this. She will continue lasix at discharge and should start fluid restriction. She should have a follow up BMP at the nursing home within a week.  She does have anxiety/panic disorder and her medications were adjusted as below.   She is medically stable and will be discharged to SNU.    Exam Today:  Blood pressure 145/63, pulse 84, temperature 97.8 °F (36.6 °C), temperature source Oral, resp. rate 18, height 162.6 cm (64\"), weight 52.5 kg (115 lb 11.9 oz), SpO2 96 %, not currently breastfeeding.  Vitals and nursing note reviewed.   Constitutional:       General: She is not in acute distress.     Appearance: She is not toxic-appearing or diaphoretic.      Comments: Elderly, frail   HENT:      Head: Normocephalic and atraumatic.      Nose: Nose normal.      Mouth/Throat:      Mouth: Mucous membranes are moist.      Pharynx: Oropharynx is clear.   Eyes:      Extraocular Movements: Extraocular movements intact.      Conjunctiva/sclera: Conjunctivae normal.      Pupils: Pupils are equal, round, and reactive to light.   Cardiovascular:      Rate and Rhythm: Normal rate and regular rhythm.      Pulses: Normal pulses.   Pulmonary:      Effort: Pulmonary effort is normal.      Breath sounds: Normal breath sounds.   Abdominal:      General: Bowel sounds are normal.      Palpations: Abdomen is soft.      Tenderness: There is no abdominal tenderness.   Musculoskeletal:         General: No swelling or tenderness.      Cervical back: Normal range of motion and neck supple.   Skin:     General: Skin is warm and dry.      Capillary Refill: Capillary refill takes less than 2 seconds.   Neurological:      General: No focal deficit present.      Mental Status: She is alert.   Psychiatric:         Mood and Affect: Normal     Behavior: Behavior normal.     Procedures Performed:  CT ANGIOGRAM " CHEST-10/5/21   INDICATIONS: Short of breath, atrial fibrillation, right lower extremity  DVT.  Radiation dose reduction techniques were utilized, including  automated exposure control and exposure modulation based on body size.     TECHNIQUE: CT angiography of the chest, with three-dimensional  reconstructions     COMPARISON: Correlated with chest x-ray from 10/05/2021     FINDINGS:     No pulmonary embolism. The aortic lumen is not evaluated owing to phase  of contrast enhancement.     The heart size is enlarged without pericardial effusion. A few small  subcentimeter short axis mediastinal lymph nodes are seen that are not  significant by size criteria.     The airways appear clear.     Mild right more than left pleural effusions are seen.     The lungs show atelectasis versus infiltrate in the right more than left  lower lungs. Old granulomatous disease is seen. A 2 mm nodule in the  right upper lobe on axial image 9 is nonspecific, interval follow-up  could be obtained if indicated.     Upper abdominal structures show no acute findings. Mild to moderate  colonic fecal retention is apparent.     Degenerative changes are seen in the spine, shoulders. No acute fracture  is identified.     IMPRESSION:     No pulmonary embolism. Cardiomegaly. Mild right more than left pleural  effusions and lower lung atelectasis versus infiltrate. Tiny right upper  lobe indeterminate pulmonary nodule.    XR ABDOMEN KUB-10/6/21   INDICATIONS: Constipation     TECHNIQUE: Supine views of the abdomen     COMPARISON: 07/29/2019     FINDINGS:      The bowel gas pattern is nonobstructive. Moderate colonic fecal  retention is apparent. No supine evidence for free intraperitoneal gas.  No definite nephrolithiasis. Follow-up/further evaluation can be  obtained as indicated.     Right more than left pleural effusions are apparent. The heart appears  enlarged, thoracic aorta appears tortuous, calcified.    Shira Davila  Duplex scan of  extremity veins including responses to compression and other maneuvers; unilateral  Order# [id number]  Reading physician: Geraldo Roque MD Ordering physician: Eleazar Hernandez MD Study date: 10/5/21       Patient Information    Patient Name   Shira Davila MRN   [id number] Legal Sex   Female  (Age)   1921 (89 y.o.)     Clinical Indication    edema/swelling; right; calf     Admission Information    Admission Date/Time Discharge Date/Time Room/Bed   10/05/21  02:54 PM  E451/1     Interpretation Summary    · Acute right lower extremity deep vein thrombosis noted in the mid femoral and distal femoral.  · All other right sided veins appeared normal.      Consults:   Consults     Date and Time Order Name Status Description    10/13/2021 10:35 AM Inpatient Nephrology Consult Completed     10/5/2021 10:10 PM Inpatient Cardiology Consult Completed     10/5/2021  8:47 PM [facility] (on-call MD unless specified) Details Completed            Discharge Disposition:  Skilled Nursing Facility (DC - External)    Discharge Medications:     Discharge Medications      New Medications      Instructions Start Date   apixaban 2.5 MG tablet tablet  Commonly known as: ELIQUIS   2.5 mg, Oral, Every 12 Hours Scheduled      famotidine 20 MG tablet  Commonly known as: PEPCID   20 mg, Oral, Daily   Start Date: October 15, 2021     Metoprolol Succinate 25 MG capsule extended-release 24 hour sprinkle   25 mg, Oral, Daily      PARoxetine 10 MG tablet  Commonly known as: PAXIL   10 mg, Oral, Nightly      polyethylene glycol 17 g packet  Commonly known as: MIRALAX   17 g, Oral, Daily      sennosides-docusate 8.6-50 MG per tablet  Commonly known as: PERICOLACE   2 tablets, Oral, Nightly         Changes to Medications      Instructions Start Date   clonazePAM 0.5 MG tablet  Commonly known as: KlonoPIN  What changed: when to take this   0.5 mg, Oral, 2 Times Daily PRN      furosemide 40 MG tablet  Commonly known as: LASIX  What changed:    · medication strength  · how much to take   40 mg, Oral, Daily   Start Date: October 15, 2021        Continue These Medications      Instructions Start Date   aspirin 81 MG tablet   81 mg, Oral, Daily      nystatin-zinc oxide   Apply to bid to skin      solifenacin 5 MG tablet  Commonly known as: VESICARE   TAKE 1 TABLET BY MOUTH EVERY DAY      vitamin B-12 500 MCG tablet  Commonly known as: CYANOCOBALAMIN   500 mcg, Oral, Daily         Stop These Medications    B6 Natural 100 MG tablet  Generic drug: pyridoxine     potassium chloride 10 MEQ CR tablet  Commonly known as: K-NETTA WINGOR-CON            Discharge Diet:   Diet Instructions     Diet: Cardiac, Soft Texture; Thin Liquids, No Restrictions; Chopped      Discharge Diet:  Cardiac  Soft Texture       Fluid Consistency: Thin Liquids, No Restrictions    Soft Options: Chopped    Fluid Restriction per day: 1000 mL Fluid          Activity at Discharge:   Activity Instructions     Activity as Tolerated            Follow-up Appointments:  No future appointments.  Additional Instructions for the Follow-ups that You Need to Schedule     Discharge Follow-up with Specialty: general practitioner or PCP at [facility]   As directed      Specialty: general practitioner or PCP at [facility]    Follow Up Details: 1-3d         Discharge Follow-up with Specified Provider: cardiology; 3 Weeks   As directed      To: cardiology    Follow Up: 3 Weeks               Geraldo Welch MD  10/14/21  10:54 EDT    Time Spent on Discharge Activities: greater than 30 minutes.

## 2021-10-14 NOTE — CASE MANAGEMENT/SOCIAL WORK
Continued Stay Note  [facility]     Patient Name: Shira Davila  MRN: [id number]  Today's Date: 10/14/2021    Admit Date: 10/5/2021     Discharge Plan     Row Name 10/14/21 1338       Plan    Plan Skilled bed at [facility]; EMS for 3:00 P.M    Plan Comments CCP spoke with [facility]; bed available today. CCP spoke with patient's nephew Dr. Cruz; discussed bed being available at [facility] and he is in agreement. CCP discussed no guarantee insurance coverage for EMS transportation; patient's nephew verbalized understanding. CCP discussed with patient at bedside and she is in agreement. Packet on chart. Yuliya LYNN               Discharge Codes    No documentation.               Expected Discharge Date and Time     Expected Discharge Date Expected Discharge Time    Oct 14, 2021             LEAH Valdes

## 2021-10-14 NOTE — CASE MANAGEMENT/SOCIAL WORK
"Physicians Statement of Medical Necessity for  Ambulance Transportation    GENERAL INFORMATION     Name: Shira Davila  YOB: 1921  Medicare #: ANTHEM: [id number]  Transport Date: 10/14/21  (Valid for round trips this date, or for scheduled repetitive trips for 60 days from the date signed below.)  Origin: [facility]   Destination: [facility]   Is the Patient's stay covered under Medicare Part A (PPS/DRG?) no   Closest appropriate facility? Yes   If this a hosp-hosp transfer? No   Is this a hospice patient? No     MEDICAL NECESSITY QUESTIONAIRE    Ambulance Transportation is medically necessary only if other means of transportation are contraindicated or would be potentially harmful to the patient.  To meet this requirement, the patient must be either \"bed confined\" or suffer from a condition such that transport by means other than an ambulance is contraindicated by the patient's condition.  The following questions must be answered by the healthcare professional signing below for this form to be valid:     1) Describe the MEDICAL CONDITION (physical and/or mental) of this patient AT THE TIME OF AMBULANCE TRANSPORT that requires the patient to be transported in an ambulance, and why transport by other means is contraindicated by the patient's condition:   Past Medical History:   Diagnosis Date   • Anxiety    • Arthritis    • CHF (congestive heart failure) (HCC)    • Coronary artery disease    • Disease of thyroid gland 3/16/2016   • Diverticulitis    • Hypertension       Past Surgical History:   Procedure Laterality Date   • ABDOMINAL SURGERY      liban   • APPENDECTOMY     • COLON SURGERY      fistulectomy   • EYE SURGERY      cataract   • HYSTERECTOMY     • TONSILLECTOMY        2) Is this patient \"bed confined\" as defined below? Yes    To be \"bed confined\" the patient must satisfy all three of the following criteria:  (1) unable to get up from bed without assistance; AND (2) unable to " ambulate;  AND (3) unable to sit in a chair or wheelchair.  3) Can this patient safely be transported by car or wheelchair van (I.e., may safely sit during transport, without an attendant or monitoring?) no   4. In addition to completing questions 1-3 above, please check any of the following conditions that apply*:          *Note: supporting documentation for any boxes checked must be maintained in the patient's medical records Patient is confused and Requires oxygen - unable to self administer   Mod assist for supine to sit           SIGNATURE OF PHYSICIAN OR OTHER AUTHORIZED HEALTHCARE PROFESSIONAL    I certify that the above information is true and correct based on my evaluation of this patient, and represent that the patient requires transport by ambulance and that other forms of transport are contraindicated.  I understand that this information will be used by the Centers for Medicare and Medicaid Services (CMS) to support the determiniation of medical necessity for ambulance services, and I represent that I have personal knowledge of the patient's condition at the time of transport.       If this box is checked, I also certify that the patient is physically or mentally incapable of signing the ambulance service's claim form and that the institution with which I am affiliated has furnished care, services or assistance to the patient.  My signature below is made on behalf of the patient pursuant to 42 .36(b)(4). In accordance with 42 .37, the specific reason(s) that the patient is physically or mentally incapable of signing the claim for is as follows:     Signature of Physician or Healthcare Professional  Date/Time:        (For Scheduled repetitive transport, this form is not valid for transports performed more than 60 days after this date).                                                                                                                                             --------------------------------------------------------------------------------------------  Printed Name and Credentials of Physician or Authorized Healthcare Professional     *Form must be signed by patient's attending physician for scheduled, repetitive transports,.  For non-repetitive ambulance transports, if unable to obtain the signature of the attending physician, any of the following may sign (please select below):     Physician  Clinical Nurse Specialist  Registered Nurse     Physician Assistant  Discharge Planner  Licensed Practical Nurse     Nurse Practitioner

## 2021-10-14 NOTE — PROGRESS NOTES
"RENAL/KCC:     LOS: 9 days    Patient Care Team:  Aletha Rincon MD as PCP - General (Family Medicine)    Chief Complaint:  Hyponatremia    Subjective     Interval History:   Chart reviewed.  No new events/issues.  Discussed with nursing.    Objective     Vital Sign Min/Max for last 24 hours  Temp  Min: 97.5 °F (36.4 °C)  Max: 97.8 °F (36.6 °C)   BP  Min: 132/87  Max: 144/82   Pulse  Min: 57  Max: 70   Resp  Min: 16  Max: 18   SpO2  Min: 95 %  Max: 97 %   Flow (L/min)  Min: 1  Max: 2.5   Weight  Min: 52.5 kg (115 lb 11.9 oz)  Max: 52.5 kg (115 lb 11.9 oz)     Flowsheet Rows      First Filed Value   Admission Height 162.6 cm (64\") Documented at 10/05/2021 2056   Admission Weight 58.5 kg (129 lb) Documented at 10/05/2021 2118          No intake/output data recorded.  I/O last 3 completed shifts:  In: 1620 [P.O.:1620]  Out: 1425 [Urine:1425]    Physical Exam:  GEN: Awake, NAD  ENT: PERRL, EOMI, MMM  NECK: Supple, no JVD  CHEST: CTAB, no W/R/C  CV: RRR, no M/G/R  ABD: Soft, NT, +BS  SKIN: Warm and Dry  NEURO: CN's intact      WBC No results found for: WBC   HGB No results found for: HGB   HCT No results found for: HCT   Platlets No results found for: LABPLAT   MCV No results found for: MCV       Sodium Sodium   Date Value Ref Range Status   10/14/2021 129 (L) 136 - 145 mmol/L Final   10/13/2021 127 (L) 136 - 145 mmol/L Final   10/13/2021 123 (L) 136 - 145 mmol/L Final   10/13/2021 125 (L) 136 - 145 mmol/L Final   10/12/2021 125 (L) 136 - 145 mmol/L Final   10/11/2021 127 (L) 136 - 145 mmol/L Final      Potassium Potassium   Date Value Ref Range Status   10/14/2021 4.8 3.5 - 5.2 mmol/L Final   10/13/2021 4.7 3.5 - 5.2 mmol/L Final   10/13/2021 4.6 3.5 - 5.2 mmol/L Final   10/13/2021 4.4 3.5 - 5.2 mmol/L Final   10/12/2021 4.6 3.5 - 5.2 mmol/L Final   10/11/2021 5.3 (H) 3.5 - 5.2 mmol/L Final      Chloride Chloride   Date Value Ref Range Status   10/14/2021 97 (L) 98 - 107 mmol/L Final   10/13/2021 97 (L) 98 - 107 " mmol/L Final   10/13/2021 94 (L) 98 - 107 mmol/L Final   10/13/2021 92 (L) 98 - 107 mmol/L Final   10/12/2021 93 (L) 98 - 107 mmol/L Final   10/11/2021 93 (L) 98 - 107 mmol/L Final      CO2 CO2   Date Value Ref Range Status   10/14/2021 24.9 22.0 - 29.0 mmol/L Final   10/13/2021 25.1 22.0 - 29.0 mmol/L Final   10/13/2021 22.3 22.0 - 29.0 mmol/L Final   10/13/2021 23.6 22.0 - 29.0 mmol/L Final   10/12/2021 23.2 22.0 - 29.0 mmol/L Final   10/11/2021 21.7 (L) 22.0 - 29.0 mmol/L Final      BUN BUN   Date Value Ref Range Status   10/14/2021 17 8 - 23 mg/dL Final   10/13/2021 17 8 - 23 mg/dL Final   10/13/2021 16 8 - 23 mg/dL Final   10/13/2021 14 8 - 23 mg/dL Final   10/12/2021 16 8 - 23 mg/dL Final   10/11/2021 18 8 - 23 mg/dL Final      Creatinine Creatinine   Date Value Ref Range Status   10/14/2021 0.70 0.57 - 1.00 mg/dL Final   10/13/2021 0.73 0.57 - 1.00 mg/dL Final   10/13/2021 0.73 0.57 - 1.00 mg/dL Final   10/13/2021 0.72 0.57 - 1.00 mg/dL Final   10/12/2021 0.71 0.57 - 1.00 mg/dL Final   10/11/2021 1.02 (H) 0.57 - 1.00 mg/dL Final      Calcium Calcium   Date Value Ref Range Status   10/14/2021 8.5 8.2 - 9.6 mg/dL Final   10/13/2021 8.5 8.2 - 9.6 mg/dL Final   10/13/2021 8.3 8.2 - 9.6 mg/dL Final   10/13/2021 8.2 8.2 - 9.6 mg/dL Final   10/12/2021 8.3 8.2 - 9.6 mg/dL Final   10/11/2021 9.0 8.2 - 9.6 mg/dL Final      PO4 No results found for: CAPO4   Albumin No results found for: ALBUMIN   Magnesium No results found for: MG   Uric Acid Uric Acid   Date Value Ref Range Status   10/13/2021 5.0 2.4 - 5.7 mg/dL Final           Results Review:     I reviewed the patient's new clinical results.    apixaban, 2.5 mg, Oral, Q12H  aspirin, 81 mg, Oral, Daily  castor oil-balsam peru, 1 application, Topical, Q12H  famotidine, 20 mg, Oral, Daily  furosemide, 40 mg, Oral, Daily  metoprolol tartrate, 12.5 mg, Oral, Q12H  oxybutynin, 2.5 mg, Oral, BID  PARoxetine, 10 mg, Oral, Nightly  polyethylene glycol, 17 g, Oral,  Daily  senna-docusate sodium, 2 tablet, Oral, Nightly  vitamin B-12, 500 mcg, Oral, Daily           Medication Review: Reviewed    Assessment/Plan       Hyponatremia - form CHF/ADH upregulation    Acute deep vein thrombosis (DVT) of femoral vein of right lower extremity (HCC)    Chronic constipation    Benign essential HTN    Coronary artery disease    Bilateral impacted cerumen    Stage 3a chronic kidney disease (HCC)    Acute on chronic systolic CHF (congestive heart failure) (HCC)    Pleural effusion, bilateral    V-tach (HCC)    New onset a-fib (HCC)    NICM (nonischemic cardiomyopathy) (HCC)    DNR (do not resuscitate)      Plan: Nice response to Samsca.  Na improved to 129.  Renal function good.  OK for D/C on current Lasix and will add fluid restriction.      Brandon Kent MD   [facility]  10/14/21  08:45 EDT

## 2021-10-14 NOTE — PAYOR COMM NOTE
"Shira Davila (89 y.o. Female)               ATTENTION;    [id number]    ID SUMMARY FOR REVIEW             Date of Birth Social Security Number Address Home Phone MRN    11/19/1921  [id number] [facility] [address] [phone] [id number]    [employer] Status             Latter-day Single       Admission Date Admission Type Admitting Provider Attending Provider Department, Room/Bed    10/5/21 Emergency Gera Rodrigues MD  [address], E451/1    Discharge Date Discharge Disposition Discharge Destination          10/14/2021 Skilled Nursing Facility (DC - External)              Attending Provider: (none)   Allergies: Cephalexin, Atorvastatin Calcium, Sesame Oil, Amoxicillin, Cortisone, Diazepam, Doxycycline, Erythromycin, Horse-derived Products, Levoxyl [Levothyroxine Sodium], Lexapro [Escitalopram Oxalate], Lipitor [Atorvastatin], Lopressor [Metoprolol Tartrate], Metaxalone, Omeprazole, Penicillins, Sesame Seed (Diagnostic), Solarcaine [Benzocaine], Sulfa Antibiotics    Isolation: None   Infection: None   Code Status: No CPR   Advance Care Planning Activity    Ht: 162.6 cm (64\")   Wt: 52.5 kg (115 lb 11.9 oz)    Admission Cmt: None   Principal Problem: Acute deep vein thrombosis (DVT) of femoral vein of right lower extremity (HCC) [I82.411]                 Active Insurance as of 10/5/2021     Primary Coverage     Payor Plan Insurance Group Employer/Plan Group    [place] [id number]     Payor Plan Address Payor Plan Phone Number Payor Plan Fax Number Effective Dates    [address]   1/1/1988 - None Entered    [place] [id number]       Subscriber Name Subscriber Birth Date Member ID       SHIRA DAVILA 11/19/1921 [id number]                 Emergency Contacts      (Rel.) Home Phone Work Phone Mobile Phone    SADIE (NEPHEW)DOMINICK (Relative) [phone] -- [phone]    Kylee Rivera (Friend) [phone] -- --    Omar Tavares " (Cousin) (Relative) [phone] -- [phone]    Carlos Browne (Friend) [phone] -- --               Discharge Summary      Geraldo Welch MD at 10/14/21 1002          Date of Admission: 10/5/2021  Date of Discharge:  10/14/2021  Primary Care Physician: Aletha Rincon MD     Discharge Diagnosis:  Active Hospital Problems    Diagnosis  POA   • **Acute deep vein thrombosis (DVT) of femoral vein of right lower extremity ([facility]) [I82.411]  Yes   • Hyponatremia [E87.1]  Yes   • SIADH (syndrome of inappropriate ADH production) ([facility]) [E22.2]  Yes   • DNR (do not resuscitate) [Z66]  No   • NICM (nonischemic cardiomyopathy) ([facility]) [I42.8]  Yes   • Stage 3a chronic kidney disease ([facility]) [N18.31]  Yes   • Acute on chronic systolic CHF (congestive heart failure) ([facility]) [I50.23]  Yes   • Pleural effusion, bilateral [J90]  Yes   • V-tach ([facility]) [I47.2]  No   • New onset a-fib ([facility]) [I48.91]  Yes   • Bilateral impacted cerumen [H61.23]  Yes   • Coronary artery disease [I25.10]  Yes   • Benign essential HTN [I10]  Yes   • Chronic constipation [K59.09]  Yes      Resolved Hospital Problems   No resolved problems to display.       Presenting Problem/History of Present Illness:  Atrial fibrillation with rapid ventricular response ([facility]) [I48.91]  DVT, lower extremity, proximal, acute, right ([facility]) [I82.4Y1]  Acute congestive heart failure, unspecified heart failure type ([facility]) [I50.9]     Hospital Course:  The patient is a 89 y.o. female with a history of chronic systolic CHF, CAD, and HTN that presented to [facility] with a week of painful RLE edema. Please see admission H&P for further details. She was found to have a RLE DVT and was started on anticoagulation with eliquis which will be continued at discharge. A CT angiogram of the chest was checked and showed no pulmonary embolism. She was also found to be in acute exacerbation of chronic systolic CHF and new-onset atrial fibrillation. She was started on  "diuresis to which she responded well. She was transitioned to oral lasix. She developed hyponatremia despite achieving euvolemia and urine studies were consistent with SIADH. Increased lasix and salt tabs were ineffective. Nephrology evaluated the patient and treated with samsca with good response. Her appetite improved following this. She will continue lasix at discharge and should start fluid restriction. She should have a follow up BMP at the nursing home within a week.  She does have anxiety/panic disorder and her medications were adjusted as below.   She is medically stable and will be discharged to SNU.    Exam Today:  Blood pressure 145/63, pulse 84, temperature 97.8 °F (36.6 °C), temperature source Oral, resp. rate 18, height 162.6 cm (64\"), weight 52.5 kg (115 lb 11.9 oz), SpO2 96 %, not currently breastfeeding.  Vitals and nursing note reviewed.   Constitutional:       General: She is not in acute distress.     Appearance: She is not toxic-appearing or diaphoretic.      Comments: Elderly, frail   HENT:      Head: Normocephalic and atraumatic.      Nose: Nose normal.      Mouth/Throat:      Mouth: Mucous membranes are moist.      Pharynx: Oropharynx is clear.   Eyes:      Extraocular Movements: Extraocular movements intact.      Conjunctiva/sclera: Conjunctivae normal.      Pupils: Pupils are equal, round, and reactive to light.   Cardiovascular:      Rate and Rhythm: Normal rate and regular rhythm.      Pulses: Normal pulses.   Pulmonary:      Effort: Pulmonary effort is normal.      Breath sounds: Normal breath sounds.   Abdominal:      General: Bowel sounds are normal.      Palpations: Abdomen is soft.      Tenderness: There is no abdominal tenderness.   Musculoskeletal:         General: No swelling or tenderness.      Cervical back: Normal range of motion and neck supple.   Skin:     General: Skin is warm and dry.      Capillary Refill: Capillary refill takes less than 2 seconds.   Neurological:      " General: No focal deficit present.      Mental Status: She is alert.   Psychiatric:         Mood and Affect: Normal     Behavior: Behavior normal.     Procedures Performed:  CT ANGIOGRAM CHEST-10/5/21   INDICATIONS: Short of breath, atrial fibrillation, right lower extremity  DVT.  Radiation dose reduction techniques were utilized, including  automated exposure control and exposure modulation based on body size.     TECHNIQUE: CT angiography of the chest, with three-dimensional  reconstructions     COMPARISON: Correlated with chest x-ray from 10/05/2021     FINDINGS:     No pulmonary embolism. The aortic lumen is not evaluated owing to phase  of contrast enhancement.     The heart size is enlarged without pericardial effusion. A few small  subcentimeter short axis mediastinal lymph nodes are seen that are not  significant by size criteria.     The airways appear clear.     Mild right more than left pleural effusions are seen.     The lungs show atelectasis versus infiltrate in the right more than left  lower lungs. Old granulomatous disease is seen. A 2 mm nodule in the  right upper lobe on axial image 9 is nonspecific, interval follow-up  could be obtained if indicated.     Upper abdominal structures show no acute findings. Mild to moderate  colonic fecal retention is apparent.     Degenerative changes are seen in the spine, shoulders. No acute fracture  is identified.     IMPRESSION:     No pulmonary embolism. Cardiomegaly. Mild right more than left pleural  effusions and lower lung atelectasis versus infiltrate. Tiny right upper  lobe indeterminate pulmonary nodule.    XR ABDOMEN KUB-10/6/21   INDICATIONS: Constipation     TECHNIQUE: Supine views of the abdomen     COMPARISON: 07/29/2019     FINDINGS:      The bowel gas pattern is nonobstructive. Moderate colonic fecal  retention is apparent. No supine evidence for free intraperitoneal gas.  No definite nephrolithiasis. Follow-up/further evaluation can  be  obtained as indicated.     Right more than left pleural effusions are apparent. The heart appears  enlarged, thoracic aorta appears tortuous, calcified.    Shira Davila  Duplex scan of extremity veins including responses to compression and other maneuvers; unilateral  Order# [id number]  Reading physician: Geraldo Roque MD Ordering physician: Eleazar Hernandez MD Study date: 10/5/21       Patient Information    Patient Name   Shira Davila MRN   [id number] Legal Sex   Female  (Age)   1921 (89 y.o.)     Clinical Indication    edema/swelling; right; calf     Admission Information    Admission Date/Time Discharge Date/Time Room/Bed   10/05/21  02:54 PM  E451/1     Interpretation Summary    · Acute right lower extremity deep vein thrombosis noted in the mid femoral and distal femoral.  · All other right sided veins appeared normal.      Consults:   Consults     Date and Time Order Name Status Description    10/13/2021 10:35 AM Inpatient Nephrology Consult Completed     10/5/2021 10:10 PM Inpatient Cardiology Consult Completed     10/5/2021  8:47 PM [facility] (on-call MD unless specified) Details Completed            Discharge Disposition:  Skilled Nursing Facility (DC - External)    Discharge Medications:     Discharge Medications      New Medications      Instructions Start Date   apixaban 2.5 MG tablet tablet  Commonly known as: ELIQUIS   2.5 mg, Oral, Every 12 Hours Scheduled      famotidine 20 MG tablet  Commonly known as: PEPCID   20 mg, Oral, Daily   Start Date: October 15, 2021     Metoprolol Succinate 25 MG capsule extended-release 24 hour sprinkle   25 mg, Oral, Daily      PARoxetine 10 MG tablet  Commonly known as: PAXIL   10 mg, Oral, Nightly      polyethylene glycol 17 g packet  Commonly known as: MIRALAX   17 g, Oral, Daily      sennosides-docusate 8.6-50 MG per tablet  Commonly known as: PERICOLACE   2 tablets, Oral, Nightly         Changes to Medications      Instructions Start Date    clonazePAM 0.5 MG tablet  Commonly known as: KlonoPIN  What changed: when to take this   0.5 mg, Oral, 2 Times Daily PRN      furosemide 40 MG tablet  Commonly known as: LASIX  What changed:   · medication strength  · how much to take   40 mg, Oral, Daily   Start Date: October 15, 2021        Continue These Medications      Instructions Start Date   aspirin 81 MG tablet   81 mg, Oral, Daily      nystatin-zinc oxide   Apply to bid to skin      solifenacin 5 MG tablet  Commonly known as: VESICARE   TAKE 1 TABLET BY MOUTH EVERY DAY      vitamin B-12 500 MCG tablet  Commonly known as: CYANOCOBALAMIN   500 mcg, Oral, Daily         Stop These Medications    B6 Natural 100 MG tablet  Generic drug: pyridoxine     potassium chloride 10 MEQ CR tablet  Commonly known as: K-DURNETTAOR-CON            Discharge Diet:   Diet Instructions     Diet: Cardiac, Soft Texture; Thin Liquids, No Restrictions; Chopped      Discharge Diet:  Cardiac  Soft Texture       Fluid Consistency: Thin Liquids, No Restrictions    Soft Options: Chopped    Fluid Restriction per day: 1000 mL Fluid          Activity at Discharge:   Activity Instructions     Activity as Tolerated            Follow-up Appointments:  No future appointments.  Additional Instructions for the Follow-ups that You Need to Schedule     Discharge Follow-up with Specialty: general practitioner or PCP at SNF   As directed      Specialty: general practitioner or PCP at SNF    Follow Up Details: 1-3d         Discharge Follow-up with Specified Provider: cardiology; 3 Weeks   As directed      To: cardiology    Follow Up: 3 Weeks               Geraldo Welch MD  10/14/21  10:54 EDT    Time Spent on Discharge Activities: greater than 30 minutes.          Electronically signed by Geraldo Welch MD at 10/14/21 3458

## 2021-10-14 NOTE — PLAN OF CARE
Goal Outcome Evaluation:    Progress: improving  Outcome Summary: VSS. No c/o pain or SOA. Pt slept for most of the night. Voiding more than previous night. Na- 127 and improving. Mepilex and barrier cream applied to sacrum. Turned q2hrs. Neuro checks q4hr. BMP q6hrs. Fluids encouraged. Possible d/c to [facility], to go by ambulance, monitoring am Na. Will continue to monitor.

## 2021-10-29 ENCOUNTER — HOSPITAL ENCOUNTER (INPATIENT)
Facility: HOSPITAL | Age: 86
LOS: 4 days | Discharge: NURSING, REHAB OR LONG-TERM CARE | End: 2021-11-04
Attending: EMERGENCY MEDICINE | Admitting: INTERNAL MEDICINE

## 2021-10-29 ENCOUNTER — APPOINTMENT (OUTPATIENT)
Dept: GENERAL RADIOLOGY | Facility: HOSPITAL | Age: 86
End: 2021-10-29

## 2021-10-29 DIAGNOSIS — F41.9 ANXIETY: ICD-10-CM

## 2021-10-29 DIAGNOSIS — N39.0 URINARY TRACT INFECTION WITHOUT HEMATURIA, SITE UNSPECIFIED: Primary | ICD-10-CM

## 2021-10-29 DIAGNOSIS — R41.89 ACUTE COGNITIVE DECLINE: ICD-10-CM

## 2021-10-29 LAB
ALBUMIN SERPL-MCNC: 3.2 G/DL (ref 3.5–5.2)
ALBUMIN/GLOB SERPL: 1.2 G/DL
ALP SERPL-CCNC: 63 U/L (ref 39–117)
ALT SERPL W P-5'-P-CCNC: 11 U/L (ref 1–33)
ANION GAP SERPL CALCULATED.3IONS-SCNC: 7.9 MMOL/L (ref 5–15)
AST SERPL-CCNC: 18 U/L (ref 1–32)
B PARAPERT DNA SPEC QL NAA+PROBE: NOT DETECTED
B PERT DNA SPEC QL NAA+PROBE: NOT DETECTED
BACTERIA UR QL AUTO: ABNORMAL /HPF
BASOPHILS # BLD AUTO: 0.05 10*3/MM3 (ref 0–0.2)
BASOPHILS NFR BLD AUTO: 0.6 % (ref 0–1.5)
BILIRUB SERPL-MCNC: 0.4 MG/DL (ref 0–1.2)
BILIRUB UR QL STRIP: NEGATIVE
BUN SERPL-MCNC: 18 MG/DL (ref 8–23)
BUN/CREAT SERPL: 20.7 (ref 7–25)
C PNEUM DNA NPH QL NAA+NON-PROBE: NOT DETECTED
CALCIUM SPEC-SCNC: 9.1 MG/DL (ref 8.2–9.6)
CHLORIDE SERPL-SCNC: 92 MMOL/L (ref 98–107)
CLARITY UR: ABNORMAL
CO2 SERPL-SCNC: 29.1 MMOL/L (ref 22–29)
COLOR UR: YELLOW
CREAT SERPL-MCNC: 0.87 MG/DL (ref 0.57–1)
D-LACTATE SERPL-SCNC: 1.4 MMOL/L (ref 0.5–2)
DEPRECATED RDW RBC AUTO: 38.8 FL (ref 37–54)
EOSINOPHIL # BLD AUTO: 0.08 10*3/MM3 (ref 0–0.4)
EOSINOPHIL NFR BLD AUTO: 1 % (ref 0.3–6.2)
ERYTHROCYTE [DISTWIDTH] IN BLOOD BY AUTOMATED COUNT: 12.7 % (ref 12.3–15.4)
FLUAV SUBTYP SPEC NAA+PROBE: NOT DETECTED
FLUBV RNA ISLT QL NAA+PROBE: NOT DETECTED
GFR SERPL CREATININE-BSD FRML MDRD: 60 ML/MIN/1.73
GLOBULIN UR ELPH-MCNC: 2.7 GM/DL
GLUCOSE SERPL-MCNC: 106 MG/DL (ref 65–99)
GLUCOSE UR STRIP-MCNC: NEGATIVE MG/DL
HADV DNA SPEC NAA+PROBE: NOT DETECTED
HCOV 229E RNA SPEC QL NAA+PROBE: NOT DETECTED
HCOV HKU1 RNA SPEC QL NAA+PROBE: NOT DETECTED
HCOV NL63 RNA SPEC QL NAA+PROBE: NOT DETECTED
HCOV OC43 RNA SPEC QL NAA+PROBE: NOT DETECTED
HCT VFR BLD AUTO: 42.1 % (ref 34–46.6)
HGB BLD-MCNC: 14.4 G/DL (ref 12–15.9)
HGB UR QL STRIP.AUTO: ABNORMAL
HMPV RNA NPH QL NAA+NON-PROBE: NOT DETECTED
HPIV1 RNA SPEC QL NAA+PROBE: NOT DETECTED
HPIV2 RNA SPEC QL NAA+PROBE: NOT DETECTED
HPIV3 RNA NPH QL NAA+PROBE: NOT DETECTED
HPIV4 P GENE NPH QL NAA+PROBE: NOT DETECTED
HYALINE CASTS UR QL AUTO: ABNORMAL /LPF
IMM GRANULOCYTES # BLD AUTO: 0.02 10*3/MM3 (ref 0–0.05)
IMM GRANULOCYTES NFR BLD AUTO: 0.2 % (ref 0–0.5)
KETONES UR QL STRIP: NEGATIVE
LEUKOCYTE ESTERASE UR QL STRIP.AUTO: ABNORMAL
LYMPHOCYTES # BLD AUTO: 1.21 10*3/MM3 (ref 0.7–3.1)
LYMPHOCYTES NFR BLD AUTO: 14.9 % (ref 19.6–45.3)
M PNEUMO IGG SER IA-ACNC: NOT DETECTED
MCH RBC QN AUTO: 28.9 PG (ref 26.6–33)
MCHC RBC AUTO-ENTMCNC: 34.2 G/DL (ref 31.5–35.7)
MCV RBC AUTO: 84.5 FL (ref 79–97)
MONOCYTES # BLD AUTO: 0.59 10*3/MM3 (ref 0.1–0.9)
MONOCYTES NFR BLD AUTO: 7.3 % (ref 5–12)
NEUTROPHILS NFR BLD AUTO: 6.17 10*3/MM3 (ref 1.7–7)
NEUTROPHILS NFR BLD AUTO: 76 % (ref 42.7–76)
NITRITE UR QL STRIP: NEGATIVE
NRBC BLD AUTO-RTO: 0 /100 WBC (ref 0–0.2)
PH UR STRIP.AUTO: 6 [PH] (ref 5–8)
PLATELET # BLD AUTO: 232 10*3/MM3 (ref 140–450)
PMV BLD AUTO: 10.4 FL (ref 6–12)
POTASSIUM SERPL-SCNC: 4.8 MMOL/L (ref 3.5–5.2)
PROCALCITONIN SERPL-MCNC: 0.05 NG/ML (ref 0–0.25)
PROT SERPL-MCNC: 5.9 G/DL (ref 6–8.5)
PROT UR QL STRIP: NEGATIVE
RBC # BLD AUTO: 4.98 10*6/MM3 (ref 3.77–5.28)
RBC # UR: ABNORMAL /HPF
REF LAB TEST METHOD: ABNORMAL
RHINOVIRUS RNA SPEC NAA+PROBE: NOT DETECTED
RSV RNA NPH QL NAA+NON-PROBE: NOT DETECTED
SARS-COV-2 RNA NPH QL NAA+NON-PROBE: NOT DETECTED
SODIUM SERPL-SCNC: 129 MMOL/L (ref 136–145)
SP GR UR STRIP: 1.01 (ref 1–1.03)
SQUAMOUS #/AREA URNS HPF: ABNORMAL /HPF
UROBILINOGEN UR QL STRIP: ABNORMAL
WBC # BLD AUTO: 8.12 10*3/MM3 (ref 3.4–10.8)
WBC UR QL AUTO: ABNORMAL /HPF

## 2021-10-29 PROCEDURE — 99285 EMERGENCY DEPT VISIT HI MDM: CPT

## 2021-10-29 PROCEDURE — G0378 HOSPITAL OBSERVATION PER HR: HCPCS

## 2021-10-29 PROCEDURE — 81001 URINALYSIS AUTO W/SCOPE: CPT | Performed by: EMERGENCY MEDICINE

## 2021-10-29 PROCEDURE — 84145 PROCALCITONIN (PCT): CPT | Performed by: EMERGENCY MEDICINE

## 2021-10-29 PROCEDURE — 71045 X-RAY EXAM CHEST 1 VIEW: CPT

## 2021-10-29 PROCEDURE — 25010000002 AZITHROMYCIN PER 500 MG: Performed by: INTERNAL MEDICINE

## 2021-10-29 PROCEDURE — 0202U NFCT DS 22 TRGT SARS-COV-2: CPT | Performed by: EMERGENCY MEDICINE

## 2021-10-29 PROCEDURE — 83605 ASSAY OF LACTIC ACID: CPT | Performed by: EMERGENCY MEDICINE

## 2021-10-29 PROCEDURE — 87040 BLOOD CULTURE FOR BACTERIA: CPT | Performed by: EMERGENCY MEDICINE

## 2021-10-29 PROCEDURE — 80053 COMPREHEN METABOLIC PANEL: CPT | Performed by: EMERGENCY MEDICINE

## 2021-10-29 PROCEDURE — 87086 URINE CULTURE/COLONY COUNT: CPT | Performed by: EMERGENCY MEDICINE

## 2021-10-29 PROCEDURE — 85025 COMPLETE CBC W/AUTO DIFF WBC: CPT | Performed by: EMERGENCY MEDICINE

## 2021-10-29 PROCEDURE — P9612 CATHETERIZE FOR URINE SPEC: HCPCS

## 2021-10-29 PROCEDURE — 25010000002 CEFTRIAXONE PER 250 MG: Performed by: EMERGENCY MEDICINE

## 2021-10-29 RX ORDER — OXYBUTYNIN CHLORIDE 5 MG/1
5 TABLET, EXTENDED RELEASE ORAL DAILY
Refills: 1 | Status: DISCONTINUED | OUTPATIENT
Start: 2021-10-29 | End: 2021-11-04 | Stop reason: HOSPADM

## 2021-10-29 RX ORDER — POLYETHYLENE GLYCOL 3350 17 G/17G
17 POWDER, FOR SOLUTION ORAL DAILY
Status: DISCONTINUED | OUTPATIENT
Start: 2021-10-29 | End: 2021-11-04 | Stop reason: HOSPADM

## 2021-10-29 RX ORDER — ASPIRIN 81 MG/1
81 TABLET ORAL DAILY
Status: DISCONTINUED | OUTPATIENT
Start: 2021-10-29 | End: 2021-11-04 | Stop reason: HOSPADM

## 2021-10-29 RX ORDER — PAROXETINE 10 MG/1
10 TABLET, FILM COATED ORAL NIGHTLY
Status: DISCONTINUED | OUTPATIENT
Start: 2021-10-29 | End: 2021-11-04 | Stop reason: HOSPADM

## 2021-10-29 RX ORDER — AMOXICILLIN 250 MG
2 CAPSULE ORAL NIGHTLY
Status: DISCONTINUED | OUTPATIENT
Start: 2021-10-29 | End: 2021-11-04 | Stop reason: HOSPADM

## 2021-10-29 RX ORDER — METOPROLOL SUCCINATE 25 MG/1
25 TABLET, EXTENDED RELEASE ORAL
Status: DISCONTINUED | OUTPATIENT
Start: 2021-10-29 | End: 2021-11-04 | Stop reason: HOSPADM

## 2021-10-29 RX ORDER — ACETAMINOPHEN 325 MG/1
650 TABLET ORAL EVERY 4 HOURS PRN
Status: DISCONTINUED | OUTPATIENT
Start: 2021-10-29 | End: 2021-11-04 | Stop reason: HOSPADM

## 2021-10-29 RX ORDER — ONDANSETRON 2 MG/ML
4 INJECTION INTRAMUSCULAR; INTRAVENOUS EVERY 6 HOURS PRN
Status: DISCONTINUED | OUTPATIENT
Start: 2021-10-29 | End: 2021-11-04 | Stop reason: HOSPADM

## 2021-10-29 RX ORDER — ONDANSETRON 4 MG/1
4 TABLET, FILM COATED ORAL EVERY 6 HOURS PRN
Status: DISCONTINUED | OUTPATIENT
Start: 2021-10-29 | End: 2021-11-04 | Stop reason: HOSPADM

## 2021-10-29 RX ORDER — FAMOTIDINE 20 MG/1
20 TABLET, FILM COATED ORAL DAILY
Status: DISCONTINUED | OUTPATIENT
Start: 2021-10-29 | End: 2021-11-04 | Stop reason: HOSPADM

## 2021-10-29 RX ORDER — FUROSEMIDE 40 MG/1
40 TABLET ORAL DAILY
Status: DISCONTINUED | OUTPATIENT
Start: 2021-10-29 | End: 2021-11-04 | Stop reason: HOSPADM

## 2021-10-29 RX ORDER — CHOLECALCIFEROL (VITAMIN D3) 125 MCG
500 CAPSULE ORAL DAILY
Status: DISCONTINUED | OUTPATIENT
Start: 2021-10-29 | End: 2021-11-04 | Stop reason: HOSPADM

## 2021-10-29 RX ORDER — SODIUM CHLORIDE 0.9 % (FLUSH) 0.9 %
10 SYRINGE (ML) INJECTION AS NEEDED
Status: DISCONTINUED | OUTPATIENT
Start: 2021-10-29 | End: 2021-11-04 | Stop reason: HOSPADM

## 2021-10-29 RX ORDER — UREA 10 %
3 LOTION (ML) TOPICAL NIGHTLY PRN
Status: DISCONTINUED | OUTPATIENT
Start: 2021-10-29 | End: 2021-11-04 | Stop reason: HOSPADM

## 2021-10-29 RX ORDER — NITROGLYCERIN 0.4 MG/1
0.4 TABLET SUBLINGUAL
Status: DISCONTINUED | OUTPATIENT
Start: 2021-10-29 | End: 2021-11-04 | Stop reason: HOSPADM

## 2021-10-29 RX ORDER — CLONAZEPAM 0.5 MG/1
0.5 TABLET ORAL 2 TIMES DAILY PRN
Status: DISCONTINUED | OUTPATIENT
Start: 2021-10-29 | End: 2021-11-04 | Stop reason: HOSPADM

## 2021-10-29 RX ADMIN — OXYBUTYNIN CHLORIDE 5 MG: 5 TABLET, EXTENDED RELEASE ORAL at 21:59

## 2021-10-29 RX ADMIN — DOCUSATE SODIUM 50MG AND SENNOSIDES 8.6MG 2 TABLET: 8.6; 5 TABLET, FILM COATED ORAL at 21:59

## 2021-10-29 RX ADMIN — PAROXETINE HYDROCHLORIDE HEMIHYDRATE 10 MG: 10 TABLET, FILM COATED ORAL at 21:59

## 2021-10-29 RX ADMIN — APIXABAN 2.5 MG: 2.5 TABLET, FILM COATED ORAL at 21:59

## 2021-10-29 RX ADMIN — CLONAZEPAM 0.5 MG: 0.5 TABLET ORAL at 21:59

## 2021-10-29 RX ADMIN — CEFTRIAXONE 1 G: 1 INJECTION, POWDER, FOR SOLUTION INTRAMUSCULAR; INTRAVENOUS at 15:57

## 2021-10-29 RX ADMIN — AZITHROMYCIN 500 MG: 500 INJECTION, POWDER, LYOPHILIZED, FOR SOLUTION INTRAVENOUS at 18:31

## 2021-10-30 PROBLEM — R41.89 ACUTE COGNITIVE DECLINE: Status: ACTIVE | Noted: 2021-10-30

## 2021-10-30 LAB
BACTERIA SPEC AEROBE CULT: NO GROWTH
DEPRECATED RDW RBC AUTO: 39.2 FL (ref 37–54)
ERYTHROCYTE [DISTWIDTH] IN BLOOD BY AUTOMATED COUNT: 12.7 % (ref 12.3–15.4)
HCT VFR BLD AUTO: 40.2 % (ref 34–46.6)
HGB BLD-MCNC: 13.5 G/DL (ref 12–15.9)
MCH RBC QN AUTO: 28.7 PG (ref 26.6–33)
MCHC RBC AUTO-ENTMCNC: 33.6 G/DL (ref 31.5–35.7)
MCV RBC AUTO: 85.4 FL (ref 79–97)
PLATELET # BLD AUTO: 227 10*3/MM3 (ref 140–450)
PMV BLD AUTO: 10 FL (ref 6–12)
RBC # BLD AUTO: 4.71 10*6/MM3 (ref 3.77–5.28)
WBC # BLD AUTO: 9.01 10*3/MM3 (ref 3.4–10.8)

## 2021-10-30 PROCEDURE — 97110 THERAPEUTIC EXERCISES: CPT

## 2021-10-30 PROCEDURE — 97162 PT EVAL MOD COMPLEX 30 MIN: CPT

## 2021-10-30 PROCEDURE — G0378 HOSPITAL OBSERVATION PER HR: HCPCS

## 2021-10-30 PROCEDURE — 92610 EVALUATE SWALLOWING FUNCTION: CPT

## 2021-10-30 PROCEDURE — 25010000002 CEFTRIAXONE PER 250 MG: Performed by: INTERNAL MEDICINE

## 2021-10-30 PROCEDURE — 85027 COMPLETE CBC AUTOMATED: CPT | Performed by: INTERNAL MEDICINE

## 2021-10-30 PROCEDURE — 25010000002 AZITHROMYCIN PER 500 MG: Performed by: INTERNAL MEDICINE

## 2021-10-30 RX ADMIN — CEFTRIAXONE 1 G: 1 INJECTION, POWDER, FOR SOLUTION INTRAMUSCULAR; INTRAVENOUS at 15:10

## 2021-10-30 RX ADMIN — PAROXETINE HYDROCHLORIDE HEMIHYDRATE 10 MG: 10 TABLET, FILM COATED ORAL at 21:54

## 2021-10-30 RX ADMIN — AZITHROMYCIN 500 MG: 500 INJECTION, POWDER, LYOPHILIZED, FOR SOLUTION INTRAVENOUS at 17:34

## 2021-10-30 RX ADMIN — APIXABAN 2.5 MG: 2.5 TABLET, FILM COATED ORAL at 09:54

## 2021-10-30 RX ADMIN — APIXABAN 2.5 MG: 2.5 TABLET, FILM COATED ORAL at 21:53

## 2021-10-30 RX ADMIN — SODIUM CHLORIDE, PRESERVATIVE FREE 10 ML: 5 INJECTION INTRAVENOUS at 09:54

## 2021-10-30 RX ADMIN — DOCUSATE SODIUM 50MG AND SENNOSIDES 8.6MG 2 TABLET: 8.6; 5 TABLET, FILM COATED ORAL at 21:53

## 2021-10-31 LAB
ANION GAP SERPL CALCULATED.3IONS-SCNC: 8.8 MMOL/L (ref 5–15)
BACTERIA UR QL AUTO: ABNORMAL /HPF
BASOPHILS # BLD AUTO: 0.04 10*3/MM3 (ref 0–0.2)
BASOPHILS NFR BLD AUTO: 0.5 % (ref 0–1.5)
BILIRUB UR QL STRIP: NEGATIVE
BUN SERPL-MCNC: 17 MG/DL (ref 8–23)
BUN/CREAT SERPL: 34 (ref 7–25)
CALCIUM SPEC-SCNC: 8.6 MG/DL (ref 8.2–9.6)
CHLORIDE SERPL-SCNC: 95 MMOL/L (ref 98–107)
CLARITY UR: ABNORMAL
CO2 SERPL-SCNC: 26.2 MMOL/L (ref 22–29)
COLOR UR: ABNORMAL
CREAT SERPL-MCNC: 0.5 MG/DL (ref 0.57–1)
DEPRECATED RDW RBC AUTO: 40.6 FL (ref 37–54)
EOSINOPHIL # BLD AUTO: 0.19 10*3/MM3 (ref 0–0.4)
EOSINOPHIL NFR BLD AUTO: 2.5 % (ref 0.3–6.2)
ERYTHROCYTE [DISTWIDTH] IN BLOOD BY AUTOMATED COUNT: 13 % (ref 12.3–15.4)
GFR SERPL CREATININE-BSD FRML MDRD: 114 ML/MIN/1.73
GLUCOSE SERPL-MCNC: 97 MG/DL (ref 65–99)
GLUCOSE UR STRIP-MCNC: NEGATIVE MG/DL
HCT VFR BLD AUTO: 40.3 % (ref 34–46.6)
HGB BLD-MCNC: 13.5 G/DL (ref 12–15.9)
HGB UR QL STRIP.AUTO: ABNORMAL
HYALINE CASTS UR QL AUTO: ABNORMAL /LPF
IMM GRANULOCYTES # BLD AUTO: 0.03 10*3/MM3 (ref 0–0.05)
IMM GRANULOCYTES NFR BLD AUTO: 0.4 % (ref 0–0.5)
KETONES UR QL STRIP: NEGATIVE
LEUKOCYTE ESTERASE UR QL STRIP.AUTO: ABNORMAL
LYMPHOCYTES # BLD AUTO: 1.5 10*3/MM3 (ref 0.7–3.1)
LYMPHOCYTES NFR BLD AUTO: 19.4 % (ref 19.6–45.3)
MCH RBC QN AUTO: 28.9 PG (ref 26.6–33)
MCHC RBC AUTO-ENTMCNC: 33.5 G/DL (ref 31.5–35.7)
MCV RBC AUTO: 86.3 FL (ref 79–97)
MONOCYTES # BLD AUTO: 0.78 10*3/MM3 (ref 0.1–0.9)
MONOCYTES NFR BLD AUTO: 10.1 % (ref 5–12)
NEUTROPHILS NFR BLD AUTO: 5.21 10*3/MM3 (ref 1.7–7)
NEUTROPHILS NFR BLD AUTO: 67.1 % (ref 42.7–76)
NITRITE UR QL STRIP: NEGATIVE
NRBC BLD AUTO-RTO: 0 /100 WBC (ref 0–0.2)
PH UR STRIP.AUTO: 5.5 [PH] (ref 5–8)
PLATELET # BLD AUTO: 211 10*3/MM3 (ref 140–450)
PMV BLD AUTO: 9.8 FL (ref 6–12)
POTASSIUM SERPL-SCNC: 4.2 MMOL/L (ref 3.5–5.2)
PROT UR QL STRIP: ABNORMAL
RBC # BLD AUTO: 4.67 10*6/MM3 (ref 3.77–5.28)
RBC # UR: ABNORMAL /HPF
REF LAB TEST METHOD: ABNORMAL
SODIUM SERPL-SCNC: 130 MMOL/L (ref 136–145)
SP GR UR STRIP: 1.02 (ref 1–1.03)
SQUAMOUS #/AREA URNS HPF: ABNORMAL /HPF
UROBILINOGEN UR QL STRIP: ABNORMAL
WBC # BLD AUTO: 7.75 10*3/MM3 (ref 3.4–10.8)
WBC UR QL AUTO: ABNORMAL /HPF

## 2021-10-31 PROCEDURE — 25010000002 AZITHROMYCIN PER 500 MG: Performed by: INTERNAL MEDICINE

## 2021-10-31 PROCEDURE — 25010000002 CEFTRIAXONE PER 250 MG: Performed by: INTERNAL MEDICINE

## 2021-10-31 PROCEDURE — 87086 URINE CULTURE/COLONY COUNT: CPT | Performed by: HOSPITALIST

## 2021-10-31 PROCEDURE — 85025 COMPLETE CBC W/AUTO DIFF WBC: CPT | Performed by: HOSPITALIST

## 2021-10-31 PROCEDURE — 80048 BASIC METABOLIC PNL TOTAL CA: CPT | Performed by: HOSPITALIST

## 2021-10-31 PROCEDURE — 81001 URINALYSIS AUTO W/SCOPE: CPT | Performed by: HOSPITALIST

## 2021-10-31 RX ADMIN — APIXABAN 2.5 MG: 2.5 TABLET, FILM COATED ORAL at 10:00

## 2021-10-31 RX ADMIN — APIXABAN 2.5 MG: 2.5 TABLET, FILM COATED ORAL at 20:22

## 2021-10-31 RX ADMIN — PAROXETINE HYDROCHLORIDE HEMIHYDRATE 10 MG: 10 TABLET, FILM COATED ORAL at 20:22

## 2021-10-31 RX ADMIN — CEFTRIAXONE 1 G: 1 INJECTION, POWDER, FOR SOLUTION INTRAMUSCULAR; INTRAVENOUS at 14:29

## 2021-10-31 RX ADMIN — METOPROLOL SUCCINATE 25 MG: 25 TABLET, EXTENDED RELEASE ORAL at 10:00

## 2021-10-31 RX ADMIN — AZITHROMYCIN 500 MG: 500 INJECTION, POWDER, LYOPHILIZED, FOR SOLUTION INTRAVENOUS at 18:10

## 2021-11-01 LAB
ANION GAP SERPL CALCULATED.3IONS-SCNC: 7.3 MMOL/L (ref 5–15)
BACTERIA SPEC AEROBE CULT: ABNORMAL
BASOPHILS # BLD AUTO: 0.05 10*3/MM3 (ref 0–0.2)
BASOPHILS NFR BLD AUTO: 0.7 % (ref 0–1.5)
BUN SERPL-MCNC: 20 MG/DL (ref 8–23)
BUN/CREAT SERPL: 30.3 (ref 7–25)
CALCIUM SPEC-SCNC: 8.6 MG/DL (ref 8.2–9.6)
CHLORIDE SERPL-SCNC: 96 MMOL/L (ref 98–107)
CO2 SERPL-SCNC: 27.7 MMOL/L (ref 22–29)
CREAT SERPL-MCNC: 0.66 MG/DL (ref 0.57–1)
DEPRECATED RDW RBC AUTO: 42.3 FL (ref 37–54)
EOSINOPHIL # BLD AUTO: 0.17 10*3/MM3 (ref 0–0.4)
EOSINOPHIL NFR BLD AUTO: 2.5 % (ref 0.3–6.2)
ERYTHROCYTE [DISTWIDTH] IN BLOOD BY AUTOMATED COUNT: 13 % (ref 12.3–15.4)
GFR SERPL CREATININE-BSD FRML MDRD: 83 ML/MIN/1.73
GLUCOSE SERPL-MCNC: 90 MG/DL (ref 65–99)
HCT VFR BLD AUTO: 37.3 % (ref 34–46.6)
HGB BLD-MCNC: 12 G/DL (ref 12–15.9)
IMM GRANULOCYTES # BLD AUTO: 0.02 10*3/MM3 (ref 0–0.05)
IMM GRANULOCYTES NFR BLD AUTO: 0.3 % (ref 0–0.5)
LYMPHOCYTES # BLD AUTO: 1.64 10*3/MM3 (ref 0.7–3.1)
LYMPHOCYTES NFR BLD AUTO: 24 % (ref 19.6–45.3)
MCH RBC QN AUTO: 28.4 PG (ref 26.6–33)
MCHC RBC AUTO-ENTMCNC: 32.2 G/DL (ref 31.5–35.7)
MCV RBC AUTO: 88.4 FL (ref 79–97)
MONOCYTES # BLD AUTO: 0.78 10*3/MM3 (ref 0.1–0.9)
MONOCYTES NFR BLD AUTO: 11.4 % (ref 5–12)
NEUTROPHILS NFR BLD AUTO: 4.16 10*3/MM3 (ref 1.7–7)
NEUTROPHILS NFR BLD AUTO: 61.1 % (ref 42.7–76)
NRBC BLD AUTO-RTO: 0 /100 WBC (ref 0–0.2)
PLATELET # BLD AUTO: 215 10*3/MM3 (ref 140–450)
PMV BLD AUTO: 10.3 FL (ref 6–12)
POTASSIUM SERPL-SCNC: 4.4 MMOL/L (ref 3.5–5.2)
RBC # BLD AUTO: 4.22 10*6/MM3 (ref 3.77–5.28)
SODIUM SERPL-SCNC: 131 MMOL/L (ref 136–145)
WBC # BLD AUTO: 6.82 10*3/MM3 (ref 3.4–10.8)

## 2021-11-01 PROCEDURE — 97530 THERAPEUTIC ACTIVITIES: CPT

## 2021-11-01 PROCEDURE — 85025 COMPLETE CBC W/AUTO DIFF WBC: CPT | Performed by: HOSPITALIST

## 2021-11-01 PROCEDURE — 80048 BASIC METABOLIC PNL TOTAL CA: CPT | Performed by: HOSPITALIST

## 2021-11-01 PROCEDURE — 25010000002 CEFTRIAXONE PER 250 MG: Performed by: INTERNAL MEDICINE

## 2021-11-01 RX ADMIN — FUROSEMIDE 40 MG: 40 TABLET ORAL at 09:55

## 2021-11-01 RX ADMIN — APIXABAN 2.5 MG: 2.5 TABLET, FILM COATED ORAL at 21:05

## 2021-11-01 RX ADMIN — ASPIRIN 81 MG: 81 TABLET, COATED ORAL at 13:38

## 2021-11-01 RX ADMIN — PAROXETINE HYDROCHLORIDE HEMIHYDRATE 10 MG: 10 TABLET, FILM COATED ORAL at 21:05

## 2021-11-01 RX ADMIN — Medication 500 MCG: at 21:04

## 2021-11-01 RX ADMIN — FAMOTIDINE 20 MG: 20 TABLET, FILM COATED ORAL at 13:37

## 2021-11-01 RX ADMIN — POLYETHYLENE GLYCOL 3350 17 G: 17 POWDER, FOR SOLUTION ORAL at 13:39

## 2021-11-01 RX ADMIN — APIXABAN 2.5 MG: 2.5 TABLET, FILM COATED ORAL at 09:53

## 2021-11-01 RX ADMIN — METOPROLOL SUCCINATE 25 MG: 25 TABLET, EXTENDED RELEASE ORAL at 09:56

## 2021-11-01 RX ADMIN — OXYBUTYNIN CHLORIDE 5 MG: 5 TABLET, EXTENDED RELEASE ORAL at 09:59

## 2021-11-01 RX ADMIN — CEFTRIAXONE 1 G: 1 INJECTION, POWDER, FOR SOLUTION INTRAMUSCULAR; INTRAVENOUS at 16:25

## 2021-11-01 RX ADMIN — SODIUM CHLORIDE, PRESERVATIVE FREE 10 ML: 5 INJECTION INTRAVENOUS at 09:52

## 2021-11-01 NOTE — NURSING NOTE
Pt's nephew Corey and niece Sophy collected pt's valuables and informed RN - - cash, checks, credit cards, medical insurance papers & bills to take to Corey's house; used pt's own checks to pay pt's bills, put in return envelopes, with Corey stating he would mail them.

## 2021-11-01 NOTE — PLAN OF CARE
Goal Outcome Evaluation:   VSS.  Pt remained in bed, turned regularly, safety maintained.  Still confused, with varying degrees of confusion and orientation.  New IV placed by IV team.  Visited by family and friend.

## 2021-11-01 NOTE — PROGRESS NOTES
"DAILY PROGRESS NOTE  [facility]    Patient Identification:  Name: Shira Davila  Age: 89 y.o.  Sex: female  :  1921  MRN: [id number]         Primary Care Physician: Aletha Rincon MD    Subjective:  Interval History:She is very weak and confused.    Objective:    Scheduled Meds:apixaban, 2.5 mg, Oral, Q12H  aspirin, 81 mg, Oral, Daily  cefTRIAXone, 1 g, Intravenous, Q24H  famotidine, 20 mg, Oral, Daily  furosemide, 40 mg, Oral, Daily  metoprolol succinate XL, 25 mg, Oral, Q24H  oxybutynin XL, 5 mg, Oral, Daily  PARoxetine, 10 mg, Oral, Nightly  polyethylene glycol, 17 g, Oral, Daily  sennosides-docusate, 2 tablet, Oral, Nightly  vitamin B-12, 500 mcg, Oral, Daily      Continuous Infusions:     Vital signs in last 24 hours:  Temp:  [97.3 °F (36.3 °C)-97.5 °F (36.4 °C)] 97.5 °F (36.4 °C)  Heart Rate:  [] 93  Resp:  [18-24] 18  BP: (113-124)/(56-88) 113/56    Intake/Output:    Intake/Output Summary (Last 24 hours) at 2021 1421  Last data filed at 2021 1345  Gross per 24 hour   Intake 797 ml   Output 760 ml   Net 37 ml       Exam:  /56 (BP Location: Left arm, Patient Position: Lying)   Pulse 93   Temp 97.5 °F (36.4 °C) (Oral)   Resp 18   Ht 162.6 cm (64\")   Wt 56 kg (123 lb 7.3 oz)   SpO2 95%   BMI 21.19 kg/m²     General Appearance:    confused, no distress   Head:    Normocephalic, without obvious abnormality, atraumatic   Eyes:       Throat:   Lips, tongue, gums normal   Neck:   Supple, symmetrical, trachea midline, no JVD   Lungs:     Clear to auscultation bilaterally, respirations unlabored   Chest Wall:    No tenderness or deformity    Heart:    Regular rate and rhythm, S1 and S2 normal, no murmur,no  Rub or gallop   Abdomen:     Soft, nontender, bowel sounds active, no masses, no organomegaly    Extremities:   Extremities normal, atraumatic, no cyanosis or edema   Pulses:      Skin:   Skin is warm and dry,  no rashes or palpable lesions   Neurologic:   no " focal deficits noted, confused      Lab Results (last 72 hours)     Procedure Component Value Units Date/Time    Urinalysis, Microscopic Only - Urine, Catheter [[id number]]  (Abnormal) Collected: 10/29/21 1415    Specimen: Urine, Catheter Updated: 10/29/21 1500     RBC, UA 3-5 /HPF      WBC, UA Too Numerous to Count /HPF      Bacteria, UA 2+ /HPF      Squamous Epithelial Cells, UA 7-12 /HPF      Hyaline Casts, UA None Seen /LPF      Methodology Manual Light Microscopy    Urine Culture - Urine, Urine, Catheter [[id number]] Collected: 10/29/21 1415    Specimen: Urine, Catheter Updated: 10/29/21 1500    Urinalysis With Culture If Indicated - Urine, Catheter [[id number]]  (Abnormal) Collected: 10/29/21 1415    Specimen: Urine, Catheter Updated: 10/29/21 1449     Color, UA Yellow     Appearance, UA Turbid     pH, UA 6.0     Specific Gravity, UA 1.009     Glucose, UA Negative     Ketones, UA Negative     Bilirubin, UA Negative     Blood, UA Small (1+)     Protein, UA Negative     Leuk Esterase, UA Large (3+)     Nitrite, UA Negative     Urobilinogen, UA 0.2 E.U./dL    Respiratory Panel PCR w/COVID-19(SARS-CoV-2) THI/CECI/FRANKIE/PAD/COR/MAD/AMINATA In-House, NP Swab in UTM/VTM, 3-4 HR TAT - Swab, Nasopharynx [[id number]]  (Normal) Collected: 10/29/21 1145    Specimen: Swab from Nasopharynx Updated: 10/29/21 1321     ADENOVIRUS, PCR Not Detected     Coronavirus 229E Not Detected     Coronavirus HKU1 Not Detected     Coronavirus NL63 Not Detected     Coronavirus OC43 Not Detected     COVID19 Not Detected     Human Metapneumovirus Not Detected     Human Rhinovirus/Enterovirus Not Detected     Influenza A PCR Not Detected     Influenza B PCR Not Detected     Parainfluenza Virus 1 Not Detected     Parainfluenza Virus 2 Not Detected     Parainfluenza Virus 3 Not Detected     Parainfluenza Virus 4 Not Detected     RSV, PCR Not Detected     Bordetella pertussis pcr Not Detected     Bordetella parapertussis PCR Not Detected     Chlamydophila  pneumoniae PCR Not Detected     Mycoplasma pneumo by PCR Not Detected    Narrative:      In the setting of a positive respiratory panel with a viral infection PLUS a negative procalcitonin without other underlying concern for bacterial infection, consider observing off antibiotics or discontinuation of antibiotics and continue supportive care. If the respiratory panel is positive for atypical bacterial infection (Bordetella pertussis, Chlamydophila pneumoniae, or Mycoplasma pneumoniae), consider antibiotic de-escalation to target atypical bacterial infection.    Comprehensive Metabolic Panel [[id number]]  (Abnormal) Collected: 10/29/21 1139    Specimen: Blood Updated: 10/29/21 1249     Glucose 106 mg/dL      BUN 18 mg/dL      Creatinine 0.87 mg/dL      Sodium 129 mmol/L      Potassium 4.8 mmol/L      Comment: Slight hemolysis detected by analyzer. Results may be affected.        Chloride 92 mmol/L      CO2 29.1 mmol/L      Calcium 9.1 mg/dL      Total Protein 5.9 g/dL      Albumin 3.20 g/dL      ALT (SGPT) 11 U/L      AST (SGOT) 18 U/L      Alkaline Phosphatase 63 U/L      Total Bilirubin 0.4 mg/dL      eGFR Non African Amer 60 mL/min/1.73      Globulin 2.7 gm/dL      A/G Ratio 1.2 g/dL      BUN/Creatinine Ratio 20.7     Anion Gap 7.9 mmol/L     Narrative:      GFR Normal >60  Chronic Kidney Disease <60  Kidney Failure <15      Procalcitonin [[id number]]  (Normal) Collected: 10/29/21 1139    Specimen: Blood Updated: 10/29/21 1238     Procalcitonin 0.05 ng/mL     Narrative:      As a Marker for Sepsis (Non-Neonates):     1. <0.5 ng/mL represents a low risk of severe sepsis and/or septic shock.  2. >2 ng/mL represents a high risk of severe sepsis and/or septic shock.    As a Marker for Lower Respiratory Tract Infections that require antibiotic therapy:  PCT on Admission     Antibiotic Therapy             6-12 Hrs later  >0.5                          Strongly Recommended            >0.25 - <0.5              "Recommended  0.1 - 0.25                  Discouraged                       Remeasure/reassess PCT  <0.1                         Strongly Discouraged         Remeasure/reassess PCT      As 28 day mortality risk marker: \"Change in Procalcitonin Result\" (>80% or <=80%) if Day 0 (or Day 1) and Day 4 values are available. Refer to http://www.[facility] Strategies Grouppct-calculator.picsell/    Change in PCT <=80 %   A decrease of PCT levels below or equal to 80% defines a positive change in PCT test result representing a higher risk for 28-day all-cause mortality of patients diagnosed with severe sepsis or septic shock.    Change in PCT >80 %   A decrease of PCT levels of more than 80% defines a negative change in PCT result representing a lower risk for 28-day all-cause mortality of patients diagnosed with severe sepsis or septic shock.                Lactic Acid, Plasma [[id number]]  (Normal) Collected: 10/29/21 1139    Specimen: Blood Updated: 10/29/21 1215     Lactate 1.4 mmol/L     CBC & Differential [[id number]]  (Abnormal) Collected: 10/29/21 1139    Specimen: Blood Updated: 10/29/21 1207    Narrative:      The following orders were created for panel order CBC & Differential.  Procedure                               Abnormality         Status                     ---------                               -----------         ------                     CBC Auto Differential[[id number]]        Abnormal            Final result                 Please view results for these tests on the individual orders.    CBC Auto Differential [[id number]]  (Abnormal) Collected: 10/29/21 1139    Specimen: Blood Updated: 10/29/21 1207     WBC 8.12 10*3/mm3      RBC 4.98 10*6/mm3      Hemoglobin 14.4 g/dL      Hematocrit 42.1 %      MCV 84.5 fL      MCH 28.9 pg      MCHC 34.2 g/dL      RDW 12.7 %      RDW-SD 38.8 fl      MPV 10.4 fL      Platelets 232 10*3/mm3      Neutrophil % 76.0 %      Lymphocyte % 14.9 %      Monocyte % 7.3 %      Eosinophil % 1.0 %      " Basophil % 0.6 %      Immature Grans % 0.2 %      Neutrophils, Absolute 6.17 10*3/mm3      Lymphocytes, Absolute 1.21 10*3/mm3      Monocytes, Absolute 0.59 10*3/mm3      Eosinophils, Absolute 0.08 10*3/mm3      Basophils, Absolute 0.05 10*3/mm3      Immature Grans, Absolute 0.02 10*3/mm3      nRBC 0.0 /100 WBC     Blood Culture - Blood, Arm, Left [[id number]] Collected: 10/29/21 1155    Specimen: Blood from Arm, Left Updated: 10/29/21 1159    Blood Culture - Blood, Arm, Left [[id number]] Collected: 10/29/21 1139    Specimen: Blood from Arm, Left Updated: 10/29/21 1148        Data Review:  Results from last 7 days   Lab Units 11/01/21  0400 10/31/21  0505 10/31/21  0505 10/29/21  1139 10/29/21  1139   SODIUM mmol/L 131*  --  130*  --  129*   POTASSIUM mmol/L 4.4  --  4.2  --  4.8   CHLORIDE mmol/L 96*  --  95*  --  92*   CO2 mmol/L 27.7  --  26.2  --  29.1*   BUN mg/dL 20  --  17  --  18   CREATININE mg/dL 0.66  --  0.50*  --  0.87   GLUCOSE mg/dL 90   < > 97   < > 106*   CALCIUM mg/dL 8.6  --  8.6  --  9.1    < > = values in this interval not displayed.     Results from last 7 days   Lab Units 11/01/21  0400 10/31/21  0505 10/30/21  1252   WBC 10*3/mm3 6.82 7.75 9.01   HEMOGLOBIN g/dL 12.0 13.5 13.5   HEMATOCRIT % 37.3 40.3 40.2   PLATELETS 10*3/mm3 215 211 227             Lab Results   Lab Value Date/Time    TROPONINT 0.038 (C) 10/06/2021 0243    TROPONINT 0.045 (C) 10/05/2021 1520    TROPONINT <0.010 05/29/2018 1713    TROPONINT <0.010 02/22/2018 1913    TROPONINT <0.010 06/04/2017 1551    TROPONINT <0.010 03/13/2017 1155    TROPONINT <0.010 07/03/2016 1729    TROPONINT <0.01 07/20/2015 0040    TROPONINT <0.01 10/12/2014 1055    TROPONINT <0.01 04/18/2014 2330    TROPONINT <0.01 01/13/2014 1322         Results from last 7 days   Lab Units 10/29/21  1139   ALK PHOS U/L 63   BILIRUBIN mg/dL 0.4   ALT (SGPT) U/L 11   AST (SGOT) U/L 18             No results found for: POCGLU        Past Medical History:   Diagnosis  Date   • Anxiety    • Arthritis    • CHF (congestive heart failure) ([facility])    • Coronary artery disease    • Disease of thyroid gland 3/16/2016   • Diverticulitis    • Hypertension        Assessment:  Active Hospital Problems    Diagnosis  POA   • **Urinary tract infection without hematuria [N39.0]  Yes   • Acute cognitive decline [R41.89]  Unknown   • Anxiety [F41.9]  Yes   • Coronary artery disease [I25.10]  Yes   • Benign essential HTN [I10]  Yes   • Chronic fatigue [R53.82]  Yes   • Chronic systolic CHF (congestive heart failure) ([facility]) [I50.22]  Yes   • Chronic constipation [K59.09]  Yes   • Abdominal pain, vomiting, and diarrhea [R10.9, R11.10, R19.7]  Yes   • Disease of thyroid gland [E07.9]  Yes      Resolved Hospital Problems   No resolved problems to display.       Plan:  Continue with antibiotics, follow lab and cultures.    Grady Paz MD  11/1/2021  14:21 EDT

## 2021-11-01 NOTE — DISCHARGE PLACEMENT REQUEST
"Shira Davila (89 y.o. Female)             Date of Birth Social Security Number Address Home Phone MRN    11/19/1921  [facility] HOME  [address] [facility] [id number] [phone] [id number]    [employer] Marital Status             Mosque Single       Admission Date Admission Type Admitting Provider Attending Provider Department, Room/Bed    10/29/21 Emergency StinglJacy MD Beard, Lyle E, MD [address], [address]1    Discharge Date Discharge Disposition Discharge Destination                         Attending Provider: Grady Paz MD    Allergies: Cephalexin, Atorvastatin Calcium, Sesame Oil, Amoxicillin, Cortisone, Diazepam, Doxycycline, Erythromycin, Horse-derived Products, Levoxyl [Levothyroxine Sodium], Lexapro [Escitalopram Oxalate], Lipitor [Atorvastatin], Lopressor [Metoprolol Tartrate], Metaxalone, Omeprazole, Penicillins, Sesame Seed (Diagnostic), Solarcaine [Benzocaine], Sulfa Antibiotics    Isolation: None   Infection: None   Code Status: No CPR   Advance Care Planning Activity    Ht: 162.6 cm (64\")   Wt: 56 kg (123 lb 7.3 oz)    Admission Cmt: None   Principal Problem: Urinary tract infection without hematuria [N39.0]                 Active Insurance as of 10/29/2021     Primary Coverage     Payor Plan Insurance Group Employer/Plan Group    [place] [id number]     Payor Plan Address Payor Plan Phone Number Payor Plan Fax Number Effective Dates    [address]   1/1/1988 - None Entered    [place] [id number]       Subscriber Name Subscriber Birth Date Member ID       SHIRA DAVILA 11/19/1921 [id number]                 Emergency Contacts      (Rel.) Home Phone Work Phone Mobile Phone    SADIE (NEPHEW)DOMINICK (Relative) [phone] -- [phone]    Kylee Rivera (Friend) [phone] -- --    Omar Tavares (Cousin) (Relative) [phone] -- [phone]    Carlos Browne (Friend) [phone] -- --            "

## 2021-11-01 NOTE — PLAN OF CARE
Goal Outcome Evaluation:              Outcome Summary: Pt very [place]. Makes caring for pt dificult. Pt straight cath this am only 200ml out. Pt rested well between care. Takes pill well with applesauce whole preferred. Cont to monitor, safety maintained.

## 2021-11-01 NOTE — PROGRESS NOTES
"Adult Nutrition  Assessment/PES    Patient Name:  Shira Davila  YOB: 1921  MRN: [id number]  Admit Date:  10/29/2021    Assessment Date:  11/1/2021  Nutrition assessment triggered by MST score-3 and RN consult.  EMR reviewed. Labs, meds reviewed.  Admitted with UTI, acute cognitive decline. Very [place].  Pureed diet, vegetarian, munxv-[id number]-jrusj, patient requests no meat. Poor po intake recorded. Ordered ONS-boost BID. Needs encouragement with meals.  Will continue to follow clinical course, nutritional needs.     Reason for Assessment     Row Name 11/01/21 1211          Reason for Assessment    Reason For Assessment identified at risk by screening criteria     Diagnosis UTI, acute cognitive decline, CAD, HTN, CHF     Identified At Risk by Screening Criteria MST SCORE 2+; large or nonhealing wound, burn or pressure injury  R coccyx stage 2                Nutrition/Diet History     Row Name 11/01/21 1212          Nutrition/Diet History    Typical Food/Fluid Intake pureed diet, 0% for breakfast, 25% of dinner-not meeting nutritional needs                Anthropometrics     Row Name 11/01/21 1212 11/01/21 0638       Anthropometrics    Height 162.6 cm (64\")     Weight  56 kg (123 lb 7.3 oz)       Admit Weight    Admit Weight 56 kg (123 lb 7.3 oz)        Ideal Body Weight (IBW)    Ideal Body Weight (IBW) (kg) 55     % of Ideal Body Weight Assessment 101%        Body Mass Index (BMI)    BMI Assessment BMI 18.5-24.9: normal  bmi 21.1                Labs/Tests/Procedures/Meds     Row Name 11/01/21 1213          Labs/Procedures/Meds    Lab Results Reviewed reviewed, pertinent     Lab Results Comments Na            Diagnostic Tests/Procedures    Diagnostic Test/Procedure Reviewed reviewed, pertinent            Medications    Pertinent Medications Reviewed reviewed, pertinent     Pertinent Medications Comments lasix, miralax, vitamn B12                Physical Findings     Row Name 11/01/21 1213          Physical " "Findings    Skin pressure injury  stage 2 pressure injury coccyx                Estimated/Assessed Needs     Row Name 11/01/21 1212          Calculation Measurements    Height 162.6 cm (64\")                Nutrition Prescription Ordered     Row Name 11/01/21 1213          Nutrition Prescription PO    Current PO Diet Pureed     Common Modifiers Vegetarian     Vegetarian Details [id number]-[id number]: allows dairy products, eggs, and fish/seafood                       Problem/Interventions:   Problem 1     Row Name 11/01/21 1214          Nutrition Diagnoses Problem 1    Problem 1 Inadequate Nutrient Intake     Etiology (related to) Functional Diagnosis     Functional Diagnosis Cognitive deficit     Signs/Symptoms (evidenced by) Report of Mnimal PO Intake                      Intervention Goal     Row Name 11/01/21 1214          Intervention Goal    General Maintain nutrition; Meet nutritional needs for age/condition; Provide information regarding MNT for treatment/condition     PO Tolerate PO; Increase intake     Weight Maintain weight                Nutrition Intervention     Row Name 11/01/21 1214          Nutrition Intervention    RD/Tech Action Care plan reviewd; Follow Tx progress; Recommend/ordered     Recommended/Ordered Supplement                Nutrition Prescription     Row Name 11/01/21 1215          Nutrition Prescription PO    PO Prescription Begin/change supplement     Supplement Boost     Supplement Frequency 2 times a day     New PO Prescription Ordered? Yes                Education/Evaluation     Row Name 11/01/21 1215          Education    Education Will Instruct as appropriate            Monitor/Evaluation    Monitor Per protocol                 Electronically signed by:  Shiela Quezada RD  11/01/21 12:16 EDT  "

## 2021-11-01 NOTE — PLAN OF CARE
Patient very confused. Reporting SOB despite oxygen saturation on 4L NC maintaining between 92-97%. Very anxious with therapy staff. MaxA/dependent for rolling side<>side and reclined sitting in bed. R shoulder/upper extremity very painful. Refused additional bed activities such as mobility or exercise. Patient with limited tolerance even functional reach. Very much waxes and weans. Wouldn't hold cup for therapy staff but RN reported with set up and cueing she was able to feed herself. Plans to DC to SNU with transition to LTC.          Problem: Adult Inpatient Plan of Care  Goal: Plan of Care Review  Outcome: Ongoing, Progressing  Flowsheets (Taken 10/31/2021 0707 by Davina Nolasco, RN)  Plan of Care Reviewed With: patient   Goal Outcome Evaluation:

## 2021-11-01 NOTE — CASE MANAGEMENT/SOCIAL WORK
Discharge Planning Assessment  [facility]     Patient Name: Shira Davila  MRN: [id number]  Today's Date: 11/1/2021    Admit Date: 10/29/2021     Discharge Needs Assessment     Row Name 11/01/21 1349       Living Environment    Lives With facility resident    Current Living Arrangements extended care facility    Provides Primary Care For no one, unable/limited ability to care for self    Family Caregiver if Needed none    Quality of Family Relationships involved; helpful; supportive    Able to Return to Prior Arrangements yes       Resource/Environmental Concerns    Resource/Environmental Concerns none    Transportation Concerns car, none       Transition Planning    Patient/Family Anticipates Transition to long-term care facility    Patient/Family Anticipated Services at Transition skilled nursing    Transportation Anticipated health plan transportation       Discharge Needs Assessment    Current Outpatient/Agency/Support Group skilled nursing facility    Concerns to be Addressed discharge planning    Anticipated Changes Related to Illness none    Outpatient/Agency/Support Group Needs skilled nursing facility    Discharge Facility/Level of Care Needs nursing facility, skilled    Provided Post Acute Provider List? N/A    N/A Provider List Comment patient's nephew Corey Cruz plans for patient to return to [facility]               Discharge Plan     Row Name 11/01/21 1403       Plan    Plan Return to [facility] home SNF    Patient/Family in Agreement with Plan yes    Plan Comments CCP noted patient is confused per nursing staff. Completed screening via claire Nicole ([phone]). Patient has been at skilled rehab at [facility] since 10/14/21. Corey plans for patient to return to skilled rehab at discharge. CCP noted patient's confusion and PT notes and anticipate that patient will require EMS for transportation. Notified Corey CCP cannot guarantee insurance coverage of EMS and he verbally acknowledges. CCP spoke  with Scott who confirms patient is from a skilled bed with no bed hold. Patient can return skilled pending bed availability. Packet in CCP office. Elsa Michael RN/CCP              Continued Care and Services - Admitted Since 10/29/2021     Destination     Service Provider Request Status Selected Services Address Phone Fax Patient Preferred    [facility]  Pending - Request Sent N/A [address] [facility] [address] [phone] --            Selected Continued Care - Prior Encounters Includes selections from prior encounters from 7/31/2021 to 11/1/2021    Discharged on 10/14/2021 Admission date: 10/5/2021 - Discharge disposition: Skilled Nursing Facility (DC - External)    Destination     Service Provider Selected Services Address Phone Fax Patient Preferred    [facility]  Skilled Nursing [address] [place] [address] [phone] --          Home Medical Care     Service Provider Selected Services Address Phone Fax Patient Preferred    [facility]  Home Health Services [address], [address], [facility] [address] [phone] --                    Expected Discharge Date and Time     Expected Discharge Date Expected Discharge Time    Nov 2, 2021          Demographic Summary     Row Name 11/01/21 1347       General Information    Admission Type inpatient    Arrived From home    Required Notices Provided Important Message from Medicare    Referral Source admission list    Reason for Consult discharge planning    Preferred Language English               Functional Status     Row Name 11/01/21 1349       Functional Status    Usual Activity Tolerance fair    Current Activity Tolerance poor       Functional Status, IADL    Medications completely dependent    Meal Preparation completely dependent    Housekeeping completely dependent    Laundry completely dependent    Shopping completely  dependent               Psychosocial    No documentation.                Abuse/Neglect    No documentation.                Legal    No documentation.                Substance Abuse    No documentation.                Patient Forms    No documentation.                   Jody Michael

## 2021-11-01 NOTE — THERAPY TREATMENT NOTE
Patient Name: Shira Davila  : 1921    MRN: [id number]                              Today's Date: 2021       Admit Date: 10/29/2021    Visit Dx:     ICD-10-CM ICD-9-CM   1. Urinary tract infection without hematuria, site unspecified  N39.0 599.0   2. Acute cognitive decline  R41.89 799.52     Patient Active Problem List   Diagnosis   • Abdominal pain, vomiting, and diarrhea   • Chronic constipation   • Hemorrhoid   • Arthralgia of hip   • LBP (low back pain)   • Disease of thyroid gland   • Benign essential HTN   • Chronic fatigue   • Chronic systolic CHF (congestive heart failure) ([facility])   • Coronary artery disease   • Colitis   • Bilateral impacted cerumen   • Anxiety   • Fungal infection   • Poor social situation   • Leg pain, anterior, right   • Poor mobility   • Weight loss   • Bilateral leg edema   • Stage 3a chronic kidney disease ([facility])   • Acute deep vein thrombosis (DVT) of femoral vein of right lower extremity ([facility])   • Acute on chronic systolic CHF (congestive heart failure) ([facility])   • Pleural effusion, bilateral   • V-tach ([facility])   • New onset a-fib ([facility])   • NICM (nonischemic cardiomyopathy) ([facility])   • DNR (do not resuscitate)   • Hyponatremia   • SIADH (syndrome of inappropriate ADH production) ([facility])   • Urinary tract infection without hematuria   • Acute cognitive decline     Past Medical History:   Diagnosis Date   • Anxiety    • Arthritis    • CHF (congestive heart failure) ([facility])    • Coronary artery disease    • Disease of thyroid gland 3/16/2016   • Diverticulitis    • Hypertension      Past Surgical History:   Procedure Laterality Date   • ABDOMINAL SURGERY      liban   • APPENDECTOMY     • COLON SURGERY      fistulectomy   • EYE SURGERY      cataract   • HYSTERECTOMY     • TONSILLECTOMY        General Information     Row Name 21 1203          Physical Therapy Time and Intention    Document Type therapy note (daily note)  -AE     Mode of Treatment individual therapy; physical  therapy  -AE     Row Name 11/01/21 1730          General Information    Patient Profile Reviewed yes  -AE           User Key  (r) = Recorded By, (t) = Taken By, (c) = Cosigned By    Initials Name Provider Type    Karina Burton PT Physical Therapist               Mobility     Row Name 11/01/21 1730          Bed Mobility    Bed Mobility rolling left; rolling right; scooting/bridging  -AE     Rolling Left [place] (Bed Mobility) dependent (less than 25% patient effort); 1 person assist  -AE     Rolling Right [place] (Bed Mobility) dependent (less than 25% patient effort); 1 person assist  -AE     Scooting/Bridging [place] (Bed Mobility) dependent (less than 25% patient effort); 2 person assist  -AE           User Key  (r) = Recorded By, (t) = Taken By, (c) = Cosigned By    Initials Name Provider Type    Karina Burton PT Physical Therapist               Obj/Interventions    No documentation.                Goals/Plan    No documentation.                Clinical Impression     Row Name 11/01/21 1731          Pain    Additional Documentation Pain Scale: Numbers Pre/Post-Treatment (Group)  -AE     Row Name 11/01/21 1731          Pain Scale: Numbers Pre/Post-Treatment    Pretreatment Pain Rating 7/10  -AE     Posttreatment Pain Rating 7/10  -AE     Pain Location - Side Right  -AE     Pain Location - Orientation proximal  -AE     Pain Location shoulder  -AE     Pain Intervention(s) Repositioned; Rest  -AE     Row Name 11/01/21 1731          Positioning and Restraints    Pre-Treatment Position in bed  -AE     Post Treatment Position bed  -AE     In Bed supine; call light within reach; encouraged to call for assist; exit alarm on; notified nsg  -AE           User Key  (r) = Recorded By, (t) = Taken By, (c) = Cosigned By    Initials Name Provider Type    Karina Burton PT Physical Therapist               Outcome Measures    No documentation.                              Physical Therapy  Education                 Title: PT OT SLP Therapies (In Progress)     Topic: Physical Therapy (In Progress)     Point: Mobility training (In Progress)     Learning Progress Summary           Patient Acceptance, E,TB,D, NR by LB at 10/30/2021 1120                   Point: Home exercise program (In Progress)     Learning Progress Summary           Patient Acceptance, E,TB,D, NR by LB at 10/30/2021 1120                   Point: Body mechanics (In Progress)     Learning Progress Summary           Patient Acceptance, E,TB,D, NR by LB at 10/30/2021 1120                   Point: Precautions (In Progress)     Learning Progress Summary           Patient Acceptance, E,TB,D, NR by LB at 10/30/2021 1120                               User Key     Initials Effective Dates Name Provider Type Discipline     08/09/20 -  Reema Manuel PT Physical Therapist PT              PT Recommendation and Plan           Time Calculation:    PT Charges     Row Name 11/01/21 1731             Time Calculation    Start Time 1700  -AE      Stop Time 1715  -AE      Time Calculation (min) 15 min  -AE      PT Received On 11/01/21  -AE      PT - Next Appointment 11/04/21  -AE      PT Goal Re-Cert Due Date 11/15/21  -AE              Time Calculation- PT    Total Timed Code Minutes- PT 15 minute(s)  -AE            User Key  (r) = Recorded By, (t) = Taken By, (c) = Cosigned By    Initials Name Provider Type    AE Karina Saab PT Physical Therapist              Therapy Charges for Today     Code Description Service Date Service Provider Modifiers Qty    11946376765 HC PT THERAPEUTIC ACT EA 15 MIN 11/1/2021 Karina Saab PT GP 1          PT G-Codes  Outcome Measure Options: AM-PAC 6 Clicks Basic Mobility (PT)  AM-PAC 6 Clicks Score (PT): 8    Karina Saab PT  11/1/2021

## 2021-11-02 LAB
ANION GAP SERPL CALCULATED.3IONS-SCNC: 6.6 MMOL/L (ref 5–15)
BASOPHILS # BLD AUTO: 0.06 10*3/MM3 (ref 0–0.2)
BASOPHILS NFR BLD AUTO: 0.8 % (ref 0–1.5)
BUN SERPL-MCNC: 17 MG/DL (ref 8–23)
BUN/CREAT SERPL: 28.8 (ref 7–25)
CALCIUM SPEC-SCNC: 8.9 MG/DL (ref 8.2–9.6)
CHLORIDE SERPL-SCNC: 96 MMOL/L (ref 98–107)
CO2 SERPL-SCNC: 28.4 MMOL/L (ref 22–29)
CREAT SERPL-MCNC: 0.59 MG/DL (ref 0.57–1)
DEPRECATED RDW RBC AUTO: 41.1 FL (ref 37–54)
EOSINOPHIL # BLD AUTO: 0.22 10*3/MM3 (ref 0–0.4)
EOSINOPHIL NFR BLD AUTO: 3.1 % (ref 0.3–6.2)
ERYTHROCYTE [DISTWIDTH] IN BLOOD BY AUTOMATED COUNT: 13 % (ref 12.3–15.4)
GFR SERPL CREATININE-BSD FRML MDRD: 94 ML/MIN/1.73
GLUCOSE SERPL-MCNC: 98 MG/DL (ref 65–99)
HCT VFR BLD AUTO: 36.7 % (ref 34–46.6)
HGB BLD-MCNC: 12.1 G/DL (ref 12–15.9)
IMM GRANULOCYTES # BLD AUTO: 0.03 10*3/MM3 (ref 0–0.05)
IMM GRANULOCYTES NFR BLD AUTO: 0.4 % (ref 0–0.5)
LYMPHOCYTES # BLD AUTO: 1.57 10*3/MM3 (ref 0.7–3.1)
LYMPHOCYTES NFR BLD AUTO: 22.2 % (ref 19.6–45.3)
MCH RBC QN AUTO: 28.5 PG (ref 26.6–33)
MCHC RBC AUTO-ENTMCNC: 33 G/DL (ref 31.5–35.7)
MCV RBC AUTO: 86.4 FL (ref 79–97)
MONOCYTES # BLD AUTO: 0.86 10*3/MM3 (ref 0.1–0.9)
MONOCYTES NFR BLD AUTO: 12.1 % (ref 5–12)
NEUTROPHILS NFR BLD AUTO: 4.34 10*3/MM3 (ref 1.7–7)
NEUTROPHILS NFR BLD AUTO: 61.4 % (ref 42.7–76)
NRBC BLD AUTO-RTO: 0 /100 WBC (ref 0–0.2)
PLATELET # BLD AUTO: 226 10*3/MM3 (ref 140–450)
PMV BLD AUTO: 10 FL (ref 6–12)
POTASSIUM SERPL-SCNC: 4.5 MMOL/L (ref 3.5–5.2)
RBC # BLD AUTO: 4.25 10*6/MM3 (ref 3.77–5.28)
SODIUM SERPL-SCNC: 131 MMOL/L (ref 136–145)
WBC # BLD AUTO: 7.08 10*3/MM3 (ref 3.4–10.8)

## 2021-11-02 PROCEDURE — 85025 COMPLETE CBC W/AUTO DIFF WBC: CPT | Performed by: HOSPITALIST

## 2021-11-02 PROCEDURE — 97166 OT EVAL MOD COMPLEX 45 MIN: CPT

## 2021-11-02 PROCEDURE — 25010000002 CEFTRIAXONE PER 250 MG: Performed by: INTERNAL MEDICINE

## 2021-11-02 PROCEDURE — 97530 THERAPEUTIC ACTIVITIES: CPT

## 2021-11-02 PROCEDURE — 80048 BASIC METABOLIC PNL TOTAL CA: CPT | Performed by: HOSPITALIST

## 2021-11-02 PROCEDURE — 97535 SELF CARE MNGMENT TRAINING: CPT

## 2021-11-02 RX ADMIN — APIXABAN 2.5 MG: 2.5 TABLET, FILM COATED ORAL at 21:11

## 2021-11-02 RX ADMIN — CEFTRIAXONE 1 G: 1 INJECTION, POWDER, FOR SOLUTION INTRAMUSCULAR; INTRAVENOUS at 14:33

## 2021-11-02 RX ADMIN — PAROXETINE HYDROCHLORIDE HEMIHYDRATE 10 MG: 10 TABLET, FILM COATED ORAL at 21:11

## 2021-11-02 RX ADMIN — ASPIRIN 81 MG: 81 TABLET, COATED ORAL at 08:27

## 2021-11-02 RX ADMIN — Medication 500 MCG: at 08:27

## 2021-11-02 RX ADMIN — FUROSEMIDE 40 MG: 40 TABLET ORAL at 08:27

## 2021-11-02 RX ADMIN — METOPROLOL SUCCINATE 25 MG: 25 TABLET, EXTENDED RELEASE ORAL at 08:27

## 2021-11-02 RX ADMIN — APIXABAN 2.5 MG: 2.5 TABLET, FILM COATED ORAL at 08:27

## 2021-11-02 RX ADMIN — FAMOTIDINE 20 MG: 20 TABLET, FILM COATED ORAL at 08:27

## 2021-11-02 NOTE — THERAPY EVALUATION
Patient Name: Shira Davila  : 1921    MRN: [id number]                              Today's Date: 2021       Admit Date: 10/29/2021    Visit Dx:     ICD-10-CM ICD-9-CM   1. Urinary tract infection without hematuria, site unspecified  N39.0 599.0   2. Acute cognitive decline  R41.89 799.52     Patient Active Problem List   Diagnosis   • Abdominal pain, vomiting, and diarrhea   • Chronic constipation   • Hemorrhoid   • Arthralgia of hip   • LBP (low back pain)   • Disease of thyroid gland   • Benign essential HTN   • Chronic fatigue   • Chronic systolic CHF (congestive heart failure) ([facility])   • Coronary artery disease   • Colitis   • Bilateral impacted cerumen   • Anxiety   • Fungal infection   • Poor social situation   • Leg pain, anterior, right   • Poor mobility   • Weight loss   • Bilateral leg edema   • Stage 3a chronic kidney disease ([facility])   • Acute deep vein thrombosis (DVT) of femoral vein of right lower extremity ([facility])   • Acute on chronic systolic CHF (congestive heart failure) ([facility])   • Pleural effusion, bilateral   • V-tach ([facility])   • New onset a-fib ([facility])   • NICM (nonischemic cardiomyopathy) ([facility])   • DNR (do not resuscitate)   • Hyponatremia   • SIADH (syndrome of inappropriate ADH production) ([facility])   • Urinary tract infection without hematuria   • Acute cognitive decline     Past Medical History:   Diagnosis Date   • Anxiety    • Arthritis    • CHF (congestive heart failure) ([facility])    • Coronary artery disease    • Disease of thyroid gland 3/16/2016   • Diverticulitis    • Hypertension      Past Surgical History:   Procedure Laterality Date   • ABDOMINAL SURGERY      liban   • APPENDECTOMY     • COLON SURGERY      fistulectomy   • EYE SURGERY      cataract   • HYSTERECTOMY     • TONSILLECTOMY        General Information     Row Name 21 0950          OT Time and Intention    Document Type evaluation  -SM     Mode of Treatment occupational therapy; individual therapy  -SM     Row Name  11/02/21 0950          General Information    Patient Profile Reviewed yes  -     Prior Level of Function --  recent admitted to SNF for rehab due to functional decline with ADL. Prior to recent admitted to rehab was living at home.  -     Existing Precautions/Restrictions fall; oxygen therapy device and L/min  -     Row Name 11/02/21 0950          Living Environment    Lives With --  from SNF  -     Row Name 11/02/21 0950          Cognition    Orientation Status (Cognition) oriented to; person; place  -     Row Name 11/02/21 0950          Safety Issues, Functional Mobility    Safety Issues Affecting Function (Mobility) ability to follow commands; insight into deficits/self-awareness; awareness of need for assistance; judgment; problem-solving  -     Impairments Affecting Function (Mobility) cognition; endurance/activity tolerance; pain; balance; strength  -           User Key  (r) = Recorded By, (t) = Taken By, (c) = Cosigned By    Initials Name Provider Type     Jody Ferrer OT Occupational Therapist                 Mobility/ADL's     Row Name 11/02/21 0952          Bed Mobility    Bed Mobility supine-sit; sit-supine  -     Supine-Sit [place] (Bed Mobility) maximum assist (25% patient effort); verbal cues  -     Sit-Supine [place] (Bed Mobility) maximum assist (25% patient effort); 2 person assist; verbal cues  -[facility] Name 11/02/21 0952          Transfers    Comment (Transfers) stood ~ 30 seconds at EOB with HHAX2.  -     Sit-Stand B[place] (Transfers) moderate assist (50% patient effort); 2 person assist; verbal cues  -     Row Name 11/02/21 0952          Activities of Daily Living    BADL Assessment/Intervention lower body dressing; toileting; bathing; grooming; upper body dressing  -     Row Name 11/02/21 0952          Lower Body Dressing Assessment/Training    [place] Level (Lower Body Dressing) lower body dressing skills; don; socks; dependent (less than  25% patient effort)  -     Position (Lower Body Dressing) edge of bed sitting  -     Row Name 11/02/21 0952          Toileting Assessment/Training    [place] Level (Toileting) toileting skills; dependent (less than 25% patient effort)  -     Position (Toileting) supine  -     Comment (Toileting) pt with soiled brief upon standing, returned to supine and total A to change brief.  -     Row Name 11/02/21 0952          Bathing Assessment/Intervention    [place] Level (Bathing) bathing skills; distal lower extremities/feet; proximal lower extremities; perineal area; maximum assist (25% patient effort)  -     Position (Bathing) edge of bed sitting; supine  -     Row Name 11/02/21 0952          Grooming Assessment/Training    [place] Level (Grooming) grooming skills; standby assist; wash face, hands  -     Position (Grooming) edge of bed sitting  -     Row Name 11/02/21 0952          Upper Body Dressing Assessment/Training    [place] Level (Upper Body Dressing) upper body dressing skills; don; moderate assist (50% patient effort)  gown  -     Position (Upper Body Dressing) edge of bed sitting  -           User Key  (r) = Recorded By, (t) = Taken By, (c) = Cosigned By    Initials Name Provider Type    SM Jody Ferrer OT Occupational Therapist               Obj/Interventions     Row Name 11/02/21 0960          Range of Motion Comprehensive    Comment, General Range of Motion Pt reports LUE impaired ROM since fall 2 years ago. Minimal ROM at L shoulder and elbow and guards LUE during all activity. L hand WFL. RUE shoulder flexion impaired 50%, WFL distally.  -     Row Name 11/02/21 0938          Strength Comprehensive (MMT)    Comment, General Manual Muscle Testing (MMT) Assessment generalized weakness noted throughout, LUE grossly 2-/5, 2+/5 RUE  -     Row Name 11/02/21 0929          Balance    Balance Assessment sitting static balance; standing static balance  -      Static Sitting Balance mild impairment  CGA  -SM     Static Standing Balance severe impairment  Mod AX2 with posterior lean.  -SM     Balance Interventions sitting; occupation based/functional task; sit to stand  -SM     Comment, Balance sat EOB ~ 15 minutes with CGA/close SBA during ADL.  -SM           User Key  (r) = Recorded By, (t) = Taken By, (c) = Cosigned By    Initials Name Provider Type     Jody Ferrer OT Occupational Therapist               Goals/Plan     Row Name 11/02/21 1022          Transfer Goal 1 (OT)    Activity/Assistive Device (Transfer Goal 1, OT) transfers, all; sit-to-stand/stand-to-sit; bed-to-chair/chair-to-bed; commode, bedside without drop arms  -SM     [place] Level/Cues Needed (Transfer Goal 1, OT) moderate assist (50-74% patient effort)  -SM     Time Frame (Transfer Goal 1, OT) short term goal (STG); 2 weeks  -SM     Progress/Outcome (Transfer Goal 1, OT) goal ongoing  -     Row Name 11/02/21 1022          Bathing Goal 1 (OT)    Activity/Device (Bathing Goal 1, OT) bathing skills, all  -SM     [place] Level/Cues Needed (Bathing Goal 1, OT) moderate assist (50-74% patient effort)  -SM     Time Frame (Bathing Goal 1, OT) short term goal (STG); 2 weeks  -SM     Progress/Outcomes (Bathing Goal 1, OT) goal ongoing  -     Row Name 11/02/21 1022          Toileting Goal 1 (OT)    Activity/Device (Toileting Goal 1, OT) toileting skills, all  -SM     [place] Level/Cues Needed (Toileting Goal 1, OT) moderate assist (50-74% patient effort)  -SM     Time Frame (Toileting Goal 1, OT) short term goal (STG); 2 weeks  -SM     Progress/Outcome (Toileting Goal 1, OT) goal ongoing  -     Row Name 11/02/21 1022          Therapy Assessment/Plan (OT)    Planned Therapy Interventions (OT) activity tolerance training; adaptive equipment training; BADL retraining; cognitive/visual perception retraining; functional balance retraining; IADL retraining; occupation/activity based  "interventions; passive ROM/stretching; patient/caregiver education/training; ROM/therapeutic exercise; strengthening exercise; transfer/mobility retraining  -           User Key  (r) = Recorded By, (t) = Taken By, (c) = Cosigned By    Initials Name Provider Type    Jody Marshall, OT Occupational Therapist               Clinical Impression     Row Name 11/02/21 0956          Pain Scale: FACES Pre/Post-Treatment    Pain: FACES Scale, Pretreatment 4-->hurts little more  -     Pre/Posttreatment Pain Comment c/o of pain only with movement to LUE but generalized pain/grimices with all mobility efforts.  -     Row Name 11/02/21 0956          Plan of Care Review    Plan of Care Reviewed With patient  -     Outcome Summary Pt is a 89 y.o female admitted to [facility] from rehab facility after recent functional decline with UTI and PNA. Pt refused OT eval multiple times while admitted but is agreeable today. She sits EOB with max A for bed mobility to engage in ADL in unsupported sitting position. Pt completed grooming with SBA and changes gown with mod A. Pt requires X2 assist to come up to a stand and total A for toileting and max A for bathing. She grimices and groans throughout encounter and requires extra time to complete all mobility tasks often stating \"dont touch me I can do it\". Pt reports her goal is to return home. Discussed rehab potential also depenedent on pt participating with therapies. Anticipate dc back to SNF with continued OT services.  -     Row Name 11/02/21 0956          Therapy Assessment/Plan (OT)    Rehab Potential (OT) fair, will monitor progress closely  -     Criteria for Skilled Therapeutic Interventions Met (OT) yes; skilled treatment is necessary  -     Therapy Frequency (OT) 3 times/wk  -     Row Name 11/02/21 0956          Therapy Plan Review/Discharge Plan (OT)    Anticipated Discharge Disposition (OT) sub acute care setting; long-term acute care facility  -     Row Name " 11/02/21 0956          Vital Signs    Pre SpO2 (%) 90  -SM     O2 Delivery Pre Treatment supplemental O2  -SM     Intra SpO2 (%) 88  -SM     O2 Delivery Intra Treatment supplemental O2  -SM     Post SpO2 (%) 92  -SM     O2 Delivery Post Treatment supplemental O2  -SM     Row Name 11/02/21 0956          Positioning and Restraints    Pre-Treatment Position in bed  -SM     Post Treatment Position bed  -SM     In Bed fowlers; call light within reach; encouraged to call for assist; exit alarm on; notified nsg  -SM           User Key  (r) = Recorded By, (t) = Taken By, (c) = Cosigned By    Initials Name Provider Type    Jody Marshall OT Occupational Therapist               Outcome Measures     Row Name 11/02/21 1023          How much help from another is currently needed...    Putting on and taking off regular lower body clothing? 1  -SM     Bathing (including washing, rinsing, and drying) 2  -SM     Toileting (which includes using toilet bed pan or urinal) 1  -SM     Putting on and taking off regular upper body clothing 2  -SM     Taking care of personal grooming (such as brushing teeth) 3  -SM     Eating meals 3  -SM     AM-PAC 6 Clicks Score (OT) 12  -SM     Row Name 11/02/21 1023          Functional Assessment    Outcome Measure Options AM-PAC 6 Clicks Daily Activity (OT)  -           User Key  (r) = Recorded By, (t) = Taken By, (c) = Cosigned By    Initials Name Provider Type    Jody Marshall OT Occupational Therapist                Occupational Therapy Education                 Title: PT OT SLP Therapies (In Progress)     Topic: Occupational Therapy (In Progress)     Point: ADL training (In Progress)     Description:   Instruct learner(s) on proper safety adaptation and remediation techniques during self care or transfers.   Instruct in proper use of assistive devices.              Learning Progress Summary           Patient Acceptance, E, NR by KURTIS at 11/2/2021 1023    Comment: Safe teachnique for  sit to stand transfer at rwx. Pt with increased difficulty with hand placements and completed HHA today.                   Point: Home exercise program (Not Started)     Description:   Instruct learner(s) on appropriate technique for monitoring, assisting and/or progressing therapeutic exercises/activities.              Learner Progress:  Not documented in this visit.          Point: Precautions (Not Started)     Description:   Instruct learner(s) on prescribed precautions during self-care and functional transfers.              Learner Progress:  Not documented in this visit.          Point: Body mechanics (Not Started)     Description:   Instruct learner(s) on proper positioning and spine alignment during self-care, functional mobility activities and/or exercises.              Learner Progress:  Not documented in this visit.                      User Key     Initials Effective Dates Name Provider Type Discipline     04/02/20 -  Jody Ferrer OT Occupational Therapist OT              OT Recommendation and Plan  Planned Therapy Interventions (OT): activity tolerance training, adaptive equipment training, BADL retraining, cognitive/visual perception retraining, functional balance retraining, IADL retraining, occupation/activity based interventions, passive ROM/stretching, patient/caregiver education/training, ROM/therapeutic exercise, strengthening exercise, transfer/mobility retraining  Therapy Frequency (OT): 3 times/wk  Plan of Care Review  Plan of Care Reviewed With: patient  Outcome Summary: Pt is a 89 y.o female admitted to [facility] from rehab facility after recent functional decline with UTI and PNA. Pt refused OT eval multiple times while admitted but is agreeable today. She sits EOB with max A for bed mobility to engage in ADL in unsupported sitting position. Pt completed grooming with SBA and changes gown with mod A. Pt requires X2 assist to come up to a stand and total A for toileting and max A for  "bathing. She grimices and groans throughout encounter and requires extra time to complete all mobility tasks often stating \"dont touch me I can do it\". Pt reports her goal is to return home. Discussed rehab potential also depenedent on pt participating with therapies. Anticipate dc back to SNF with continued OT services.     Time Calculation:    Time Calculation- OT     Row Name 11/02/21 1025             Time Calculation- OT    OT Start Time 0842  -      OT Stop Time 0927  -      OT Time Calculation (min) 45 min  -SM      Total Timed Code Minutes- OT 30 minute(s)  -SM      OT Received On 11/02/21  -      OT - Next Appointment 11/04/21  -      OT Goal Re-Cert Due Date 11/16/21  -              Timed Charges    98899 - OT Therapeutic Activity Minutes 15  -SM      71922 - OT Self Care/Mgmt Minutes 15  -SM              Untimed Charges    OT Eval/Re-eval Minutes 15  -SM              Total Minutes    Timed Charges Total Minutes 30  -SM      Untimed Charges Total Minutes 15  -SM       Total Minutes 45  -SM            User Key  (r) = Recorded By, (t) = Taken By, (c) = Cosigned By    Initials Name Provider Type     Jody Ferrer OT Occupational Therapist              Therapy Charges for Today     Code Description Service Date Service Provider Modifiers Qty    85539049409 HC OT EVAL MOD COMPLEXITY 2 11/2/2021 Jody Ferrer OT GO 1    94457045426 HC OT SELF CARE/MGMT/TRAIN EA 15 MIN 11/2/2021 Jody Ferrer OT GO 1    85024251189 HC OT THERAPEUTIC ACT EA 15 MIN 11/2/2021 Jody Ferrer OT GO 1               Jody Ferrer OT  11/2/2021  "

## 2021-11-02 NOTE — PROGRESS NOTES
"DAILY PROGRESS NOTE  Psychiatric    Patient Identification:  Name: Shira Davila  Age: 89 y.o.  Sex: female  :  1921  MRN: [id number]         Primary Care Physician: Aletha Rincon MD    Subjective:  Interval History:She is very weak and confused.    Objective:    Scheduled Meds:apixaban, 2.5 mg, Oral, Q12H  aspirin, 81 mg, Oral, Daily  cefTRIAXone, 1 g, Intravenous, Q24H  famotidine, 20 mg, Oral, Daily  furosemide, 40 mg, Oral, Daily  metoprolol succinate XL, 25 mg, Oral, Q24H  oxybutynin XL, 5 mg, Oral, Daily  PARoxetine, 10 mg, Oral, Nightly  polyethylene glycol, 17 g, Oral, Daily  sennosides-docusate, 2 tablet, Oral, Nightly  vitamin B-12, 500 mcg, Oral, Daily      Continuous Infusions:     Vital signs in last 24 hours:  Temp:  [97.6 °F (36.4 °C)-97.9 °F (36.6 °C)] 97.6 °F (36.4 °C)  Heart Rate:  [79-98] 79  Resp:  [16-18] 16  BP: (115-124)/(52-78) 119/52    Intake/Output:    Intake/Output Summary (Last 24 hours) at 2021 1603  Last data filed at 2021 1420  Gross per 24 hour   Intake 480 ml   Output 450 ml   Net 30 ml       Exam:  /52 (BP Location: Left arm, Patient Position: Lying)   Pulse 79   Temp 97.6 °F (36.4 °C) (Oral)   Resp 16   Ht 162.6 cm (64\")   Wt 54.5 kg (120 lb 2.4 oz)   SpO2 97%   BMI 20.62 kg/m²     General Appearance:    confused, no distress   Head:    Normocephalic, without obvious abnormality, atraumatic   Eyes:       Throat:   Lips, tongue, gums normal   Neck:   Supple, symmetrical, trachea midline, no JVD   Lungs:     Clear to auscultation bilaterally, respirations unlabored   Chest Wall:    No tenderness or deformity    Heart:    Regular rate and rhythm, S1 and S2 normal, no murmur,no  Rub or gallop   Abdomen:     Soft, nontender, bowel sounds active, no masses, no organomegaly    Extremities:   Extremities normal, atraumatic, no cyanosis or edema   Pulses:      Skin:   Skin is warm and dry,  no rashes or palpable lesions   Neurologic:   " no focal deficits noted, confused      Lab Results (last 72 hours)     Procedure Component Value Units Date/Time    Urinalysis, Microscopic Only - Urine, Catheter [[id number]]  (Abnormal) Collected: 10/29/21 1415    Specimen: Urine, Catheter Updated: 10/29/21 1500     RBC, UA 3-5 /HPF      WBC, UA Too Numerous to Count /HPF      Bacteria, UA 2+ /HPF      Squamous Epithelial Cells, UA 7-12 /HPF      Hyaline Casts, UA None Seen /LPF      Methodology Manual Light Microscopy    Urine Culture - Urine, Urine, Catheter [[id number]] Collected: 10/29/21 1415    Specimen: Urine, Catheter Updated: 10/29/21 1500    Urinalysis With Culture If Indicated - Urine, Catheter [[id number]]  (Abnormal) Collected: 10/29/21 1415    Specimen: Urine, Catheter Updated: 10/29/21 1449     Color, UA Yellow     Appearance, UA Turbid     pH, UA 6.0     Specific Gravity, UA 1.009     Glucose, UA Negative     Ketones, UA Negative     Bilirubin, UA Negative     Blood, UA Small (1+)     Protein, UA Negative     Leuk Esterase, UA Large (3+)     Nitrite, UA Negative     Urobilinogen, UA 0.2 E.U./dL    Respiratory Panel PCR w/COVID-19(SARS-CoV-2) THI/CECI/FRANKIE/PAD/COR/MAD/AMINATA In-House, NP Swab in UTM/VTM, 3-4 HR TAT - Swab, Nasopharynx [[id number]]  (Normal) Collected: 10/29/21 1145    Specimen: Swab from Nasopharynx Updated: 10/29/21 1321     ADENOVIRUS, PCR Not Detected     Coronavirus 229E Not Detected     Coronavirus HKU1 Not Detected     Coronavirus NL63 Not Detected     Coronavirus OC43 Not Detected     COVID19 Not Detected     Human Metapneumovirus Not Detected     Human Rhinovirus/Enterovirus Not Detected     Influenza A PCR Not Detected     Influenza B PCR Not Detected     Parainfluenza Virus 1 Not Detected     Parainfluenza Virus 2 Not Detected     Parainfluenza Virus 3 Not Detected     Parainfluenza Virus 4 Not Detected     RSV, PCR Not Detected     Bordetella pertussis pcr Not Detected     Bordetella parapertussis PCR Not Detected      Chlamydophila pneumoniae PCR Not Detected     Mycoplasma pneumo by PCR Not Detected    Narrative:      In the setting of a positive respiratory panel with a viral infection PLUS a negative procalcitonin without other underlying concern for bacterial infection, consider observing off antibiotics or discontinuation of antibiotics and continue supportive care. If the respiratory panel is positive for atypical bacterial infection (Bordetella pertussis, Chlamydophila pneumoniae, or Mycoplasma pneumoniae), consider antibiotic de-escalation to target atypical bacterial infection.    Comprehensive Metabolic Panel [[id number]]  (Abnormal) Collected: 10/29/21 1139    Specimen: Blood Updated: 10/29/21 1249     Glucose 106 mg/dL      BUN 18 mg/dL      Creatinine 0.87 mg/dL      Sodium 129 mmol/L      Potassium 4.8 mmol/L      Comment: Slight hemolysis detected by analyzer. Results may be affected.        Chloride 92 mmol/L      CO2 29.1 mmol/L      Calcium 9.1 mg/dL      Total Protein 5.9 g/dL      Albumin 3.20 g/dL      ALT (SGPT) 11 U/L      AST (SGOT) 18 U/L      Alkaline Phosphatase 63 U/L      Total Bilirubin 0.4 mg/dL      eGFR Non African Amer 60 mL/min/1.73      Globulin 2.7 gm/dL      A/G Ratio 1.2 g/dL      BUN/Creatinine Ratio 20.7     Anion Gap 7.9 mmol/L     Narrative:      GFR Normal >60  Chronic Kidney Disease <60  Kidney Failure <15      Procalcitonin [[id number]]  (Normal) Collected: 10/29/21 1139    Specimen: Blood Updated: 10/29/21 1238     Procalcitonin 0.05 ng/mL     Narrative:      As a Marker for Sepsis (Non-Neonates):     1. <0.5 ng/mL represents a low risk of severe sepsis and/or septic shock.  2. >2 ng/mL represents a high risk of severe sepsis and/or septic shock.    As a Marker for Lower Respiratory Tract Infections that require antibiotic therapy:  PCT on Admission     Antibiotic Therapy             6-12 Hrs later  >0.5                          Strongly Recommended            >0.25 - <0.5              "Recommended  0.1 - 0.25                  Discouraged                       Remeasure/reassess PCT  <0.1                         Strongly Discouraged         Remeasure/reassess PCT      As 28 day mortality risk marker: \"Change in Procalcitonin Result\" (>80% or <=80%) if Day 0 (or Day 1) and Day 4 values are available. Refer to http://www.[facility] MediaTul[facility] – [facility]saTRIAXIS MEDICAL DEVICESpct-calculator.[employer]/    Change in PCT <=80 %   A decrease of PCT levels below or equal to 80% defines a positive change in PCT test result representing a higher risk for 28-day all-cause mortality of patients diagnosed with severe sepsis or septic shock.    Change in PCT >80 %   A decrease of PCT levels of more than 80% defines a negative change in PCT result representing a lower risk for 28-day all-cause mortality of patients diagnosed with severe sepsis or septic shock.                Lactic Acid, Plasma [[id number]]  (Normal) Collected: 10/29/21 1139    Specimen: Blood Updated: 10/29/21 1215     Lactate 1.4 mmol/L     CBC & Differential [[id number]]  (Abnormal) Collected: 10/29/21 1139    Specimen: Blood Updated: 10/29/21 1207    Narrative:      The following orders were created for panel order CBC & Differential.  Procedure                               Abnormality         Status                     ---------                               -----------         ------                     CBC Auto Differential[[id number]]        Abnormal            Final result                 Please view results for these tests on the individual orders.    CBC Auto Differential [[id number]]  (Abnormal) Collected: 10/29/21 1139    Specimen: Blood Updated: 10/29/21 1207     WBC 8.12 10*3/mm3      RBC 4.98 10*6/mm3      Hemoglobin 14.4 g/dL      Hematocrit 42.1 %      MCV 84.5 fL      MCH 28.9 pg      MCHC 34.2 g/dL      RDW 12.7 %      RDW-SD 38.8 fl      MPV 10.4 fL      Platelets 232 10*3/mm3      Neutrophil % 76.0 %      Lymphocyte % 14.9 %      Monocyte % 7.3 %      Eosinophil % 1.0 %      " Basophil % 0.6 %      Immature Grans % 0.2 %      Neutrophils, Absolute 6.17 10*3/mm3      Lymphocytes, Absolute 1.21 10*3/mm3      Monocytes, Absolute 0.59 10*3/mm3      Eosinophils, Absolute 0.08 10*3/mm3      Basophils, Absolute 0.05 10*3/mm3      Immature Grans, Absolute 0.02 10*3/mm3      nRBC 0.0 /100 WBC     Blood Culture - Blood, Arm, Left [[id number]] Collected: 10/29/21 1155    Specimen: Blood from Arm, Left Updated: 10/29/21 1159    Blood Culture - Blood, Arm, Left [[id number]] Collected: 10/29/21 1139    Specimen: Blood from Arm, Left Updated: 10/29/21 1148        Data Review:  Results from last 7 days   Lab Units 11/02/21  0604 11/01/21  0400 11/01/21  0400 10/31/21  0505 10/31/21  0505   SODIUM mmol/L 131*  --  131*  --  130*   POTASSIUM mmol/L 4.5  --  4.4  --  4.2   CHLORIDE mmol/L 96*  --  96*  --  95*   CO2 mmol/L 28.4  --  27.7  --  26.2   BUN mg/dL 17  --  20  --  17   CREATININE mg/dL 0.59  --  0.66  --  0.50*   GLUCOSE mg/dL 98   < > 90   < > 97   CALCIUM mg/dL 8.9  --  8.6  --  8.6    < > = values in this interval not displayed.     Results from last 7 days   Lab Units 11/02/21  0604 11/01/21  0400 10/31/21  0505   WBC 10*3/mm3 7.08 6.82 7.75   HEMOGLOBIN g/dL 12.1 12.0 13.5   HEMATOCRIT % 36.7 37.3 40.3   PLATELETS 10*3/mm3 226 215 211             Lab Results   Lab Value Date/Time    TROPONINT 0.038 (C) 10/06/2021 0243    TROPONINT 0.045 (C) 10/05/2021 1520    TROPONINT <0.010 05/29/2018 1713    TROPONINT <0.010 02/22/2018 1913    TROPONINT <0.010 06/04/2017 1551    TROPONINT <0.010 03/13/2017 1155    TROPONINT <0.010 07/03/2016 1729    TROPONINT <0.01 07/20/2015 0040    TROPONINT <0.01 10/12/2014 1055    TROPONINT <0.01 04/18/2014 2330    TROPONINT <0.01 01/13/2014 1322         Results from last 7 days   Lab Units 10/29/21  1139   ALK PHOS U/L 63   BILIRUBIN mg/dL 0.4   ALT (SGPT) U/L 11   AST (SGOT) U/L 18             No results found for: POCGLU        Past Medical History:   Diagnosis Date    • Anxiety    • Arthritis    • CHF (congestive heart failure) ([facility])    • Coronary artery disease    • Disease of thyroid gland 3/16/2016   • Diverticulitis    • Hypertension        Assessment:  Active Hospital Problems    Diagnosis  POA   • **Urinary tract infection without hematuria [N39.0]  Yes   • Acute cognitive decline [R41.89]  Unknown   • Anxiety [F41.9]  Yes   • Coronary artery disease [I25.10]  Yes   • Benign essential HTN [I10]  Yes   • Chronic fatigue [R53.82]  Yes   • Chronic systolic CHF (congestive heart failure) ([facility]) [I50.22]  Yes   • Chronic constipation [K59.09]  Yes   • Abdominal pain, vomiting, and diarrhea [R10.9, R11.10, R19.7]  Yes   • Disease of thyroid gland [E07.9]  Yes      Resolved Hospital Problems   No resolved problems to display.       Plan:  Continue with antibiotics, follow lab and cultures. SNU eventually. Needs Pre Cert.    Grady Paz MD  11/2/2021  16:03 EDT

## 2021-11-02 NOTE — CASE MANAGEMENT/SOCIAL WORK
Continued Stay Note  [facility]     Patient Name: Shira Davila  MRN: [id number]  Today's Date: 11/2/2021    Admit Date: 10/29/2021     Discharge Plan     Row Name 11/02/21 1301       Plan    Plan Follow up with the patient's nephew regarding returning signed paperwork needed to provide to [facility] in order for pre-cert to be initiated.    Plan Comments Due to the patient’s confusion [facility] spoke to the patient’s nephew Dr. Corey Cruz [phone] to discuss paperwork needed by [facility] to initiate the patient’s pre-cert. Corey was agreeable to the paperwork to be faxed to him at fax number [phone] and he would sign on behalf of the patient.  The patient’s nephew was requested to send the signed paperwork back to [facility].  [facility] will follow up with the patient’s nephew regarding signed paperwork and will fax to Manda with [facility] once it is received in order for Manda to initiate pre-cert for the patient.  LEAH Ledesma               Discharge Codes    No documentation.               Expected Discharge Date and Time     Expected Discharge Date Expected Discharge Time    Nov 2, 2021             LEAH Salazar

## 2021-11-02 NOTE — PLAN OF CARE
"Goal Outcome Evaluation:  Plan of Care Reviewed With: patient           Outcome Summary: Pt is a 89 y.o female admitted to [facility] from rehab facility after recent functional decline with UTI and PNA. Pt refused OT eval multiple times while admitted but is agreeable today. She sits EOB with max A for bed mobility to engage in ADL in unsupported sitting position. Pt completed grooming with SBA and changes gown with mod A. Pt requires X2 assist to come up to a stand and total A for toileting and max A for bathing. She grimices and groans throughout encounter and requires extra time to complete all mobility tasks often stating \"dont touch me I can do it\". Pt reports her goal is to return home. Discussed rehab potential also depenedent on pt participating with therapies. Anticipate dc back to SNF with continued OT services.    Appropriate PPE worn during encounter including gloves, mask, and eye protection. Hand hygiene completed. Pt wore a mask intermittently.   "

## 2021-11-02 NOTE — PLAN OF CARE
Goal Outcome Evaluation:  Plan of Care Reviewed With: patient        Progress: no change  Outcome Summary: VSS. Pt remainds confused at times. Pt remains on 4L. Bladder scan showed 241mL and 257mL. Spoke with Dr. Paz to hold off on straight cath until patient has more in bladder. Held Oxybutynin. Pt had a few incontient stools this shift. Pt has no complaints of pain or fullness in bladder. Output 600 this shift. Spoke with Corey (nephew) and Brandon Saenz () regarding pt refusing to talk with  about POA and refused to talk to insurance regarding pre-cert. New IV placed this shift, last one infiltrated. Pt appetite good. Will CTM, safety maintained.

## 2021-11-02 NOTE — PLAN OF CARE
Goal Outcome Evaluation:  Plan of Care Reviewed With: patient        Progress: improving  Outcome Summary: Urine slightly milky in tubing upon cath insertion but quickly became yellow and clear. No falls or injury. No new skin issues. Pt is oriented and appropriate and sometimes severe hearing loss makes communication very difficult. Spoke with nephew and updated him.

## 2021-11-02 NOTE — CASE MANAGEMENT/SOCIAL WORK
Continued Stay Note  [facility]     Patient Name: Shira Davila  MRN: [id number]  Today's Date: 11/2/2021    Admit Date: 10/29/2021     Discharge Plan     Row Name 11/02/21 9339       Plan    Plan Follow up with [place] and [facility]     Plan Comments The patient’s nephew signed the paperwork and faxed it back to [facility] who in turn [facility] at fax number [phone].  Spoke to the patient’s [facility]  Jhon [phone] ext. [phone] M-F 8-4:30pm.  [facility] attempted to allow Jhon to speak to the patient on speaker phone due to the patient being hard of hearing however the patient was getting a new IV placed and was not able to communicate with the [facility] .  The [facility]  stated that she would attempt to reach [facility] on Wed. 11/3/21 to try to talk to the patient again.  The [facility]  states that the patient can also provide her with a family contact to assist her if needed as well.  The [facility]  states that she has the contact number for a few nieces for the patient as well as a couple of nephews.  [facility] will follow up with Manda with [facility] and Jhon with [facility] to assist with getting pre-cert for the patient to return to [facility].  LEAH Ledesma    Row Name 11/02/21 1302       Plan    Plan Follow up with the patient's nephew regarding returning signed paperwork needed to provide to [facility] in order for pre-cert to be initiated.    Plan Comments Due to the patient’s confusion [facility] spoke to the patient’s nephew Dr. Corey Cruz [phone] to discuss paperwork needed by [facility] to initiate the patient’s pre-cert. Corey was agreeable to the paperwork to be faxed to him at fax number [phone] and he would sign on behalf of the patient.  The patient’s nephew was requested to send the signed paperwork back to [facility].  [facility] will follow up with the patient’s nephew regarding signed paperwork and  will fax to Manda with [facility] once it is received in order for Manda to initiate pre-cert for the patient.  LEAH Ledesma               Discharge Codes    No documentation.               Expected Discharge Date and Time     Expected Discharge Date Expected Discharge Time    Nov 2, 2021             LEAH Salazar

## 2021-11-03 LAB
ANION GAP SERPL CALCULATED.3IONS-SCNC: 4.9 MMOL/L (ref 5–15)
BACTERIA SPEC AEROBE CULT: NORMAL
BACTERIA SPEC AEROBE CULT: NORMAL
BASOPHILS # BLD AUTO: 0.06 10*3/MM3 (ref 0–0.2)
BASOPHILS NFR BLD AUTO: 0.8 % (ref 0–1.5)
BUN SERPL-MCNC: 18 MG/DL (ref 8–23)
BUN/CREAT SERPL: 30 (ref 7–25)
CALCIUM SPEC-SCNC: 9 MG/DL (ref 8.2–9.6)
CHLORIDE SERPL-SCNC: 95 MMOL/L (ref 98–107)
CO2 SERPL-SCNC: 30.1 MMOL/L (ref 22–29)
CREAT SERPL-MCNC: 0.6 MG/DL (ref 0.57–1)
DEPRECATED RDW RBC AUTO: 41 FL (ref 37–54)
EOSINOPHIL # BLD AUTO: 0.26 10*3/MM3 (ref 0–0.4)
EOSINOPHIL NFR BLD AUTO: 3.6 % (ref 0.3–6.2)
ERYTHROCYTE [DISTWIDTH] IN BLOOD BY AUTOMATED COUNT: 13 % (ref 12.3–15.4)
GFR SERPL CREATININE-BSD FRML MDRD: 92 ML/MIN/1.73
GLUCOSE SERPL-MCNC: 98 MG/DL (ref 65–99)
HCT VFR BLD AUTO: 38.2 % (ref 34–46.6)
HGB BLD-MCNC: 12.4 G/DL (ref 12–15.9)
IMM GRANULOCYTES # BLD AUTO: 0.03 10*3/MM3 (ref 0–0.05)
IMM GRANULOCYTES NFR BLD AUTO: 0.4 % (ref 0–0.5)
LYMPHOCYTES # BLD AUTO: 1.65 10*3/MM3 (ref 0.7–3.1)
LYMPHOCYTES NFR BLD AUTO: 22.7 % (ref 19.6–45.3)
MCH RBC QN AUTO: 28.2 PG (ref 26.6–33)
MCHC RBC AUTO-ENTMCNC: 32.5 G/DL (ref 31.5–35.7)
MCV RBC AUTO: 86.8 FL (ref 79–97)
MONOCYTES # BLD AUTO: 0.85 10*3/MM3 (ref 0.1–0.9)
MONOCYTES NFR BLD AUTO: 11.7 % (ref 5–12)
NEUTROPHILS NFR BLD AUTO: 4.41 10*3/MM3 (ref 1.7–7)
NEUTROPHILS NFR BLD AUTO: 60.8 % (ref 42.7–76)
NRBC BLD AUTO-RTO: 0 /100 WBC (ref 0–0.2)
PLATELET # BLD AUTO: 220 10*3/MM3 (ref 140–450)
PMV BLD AUTO: 9.9 FL (ref 6–12)
POTASSIUM SERPL-SCNC: 4.6 MMOL/L (ref 3.5–5.2)
RBC # BLD AUTO: 4.4 10*6/MM3 (ref 3.77–5.28)
SODIUM SERPL-SCNC: 130 MMOL/L (ref 136–145)
WBC # BLD AUTO: 7.26 10*3/MM3 (ref 3.4–10.8)

## 2021-11-03 PROCEDURE — 80048 BASIC METABOLIC PNL TOTAL CA: CPT | Performed by: HOSPITALIST

## 2021-11-03 PROCEDURE — 85025 COMPLETE CBC W/AUTO DIFF WBC: CPT | Performed by: HOSPITALIST

## 2021-11-03 PROCEDURE — 94799 UNLISTED PULMONARY SVC/PX: CPT

## 2021-11-03 RX ADMIN — PAROXETINE HYDROCHLORIDE HEMIHYDRATE 10 MG: 10 TABLET, FILM COATED ORAL at 21:29

## 2021-11-03 RX ADMIN — CLONAZEPAM 0.5 MG: 0.5 TABLET ORAL at 21:29

## 2021-11-03 RX ADMIN — Medication 500 MCG: at 08:57

## 2021-11-03 RX ADMIN — FAMOTIDINE 20 MG: 20 TABLET, FILM COATED ORAL at 08:57

## 2021-11-03 RX ADMIN — FUROSEMIDE 40 MG: 40 TABLET ORAL at 08:57

## 2021-11-03 RX ADMIN — APIXABAN 2.5 MG: 2.5 TABLET, FILM COATED ORAL at 08:57

## 2021-11-03 RX ADMIN — ASPIRIN 81 MG: 81 TABLET, COATED ORAL at 08:57

## 2021-11-03 RX ADMIN — APIXABAN 2.5 MG: 2.5 TABLET, FILM COATED ORAL at 21:29

## 2021-11-03 RX ADMIN — POLYETHYLENE GLYCOL 3350 17 G: 17 POWDER, FOR SOLUTION ORAL at 08:57

## 2021-11-03 RX ADMIN — METOPROLOL SUCCINATE 25 MG: 25 TABLET, EXTENDED RELEASE ORAL at 08:57

## 2021-11-03 RX ADMIN — DOCUSATE SODIUM 50MG AND SENNOSIDES 8.6MG 2 TABLET: 8.6; 5 TABLET, FILM COATED ORAL at 21:29

## 2021-11-03 NOTE — CASE MANAGEMENT/SOCIAL WORK
Continued Stay Note  [facility]     Patient Name: Shira Davila  MRN: [id number]  Today's Date: 11/3/2021    Admit Date: 10/29/2021     Discharge Plan     Row Name 11/03/21 1625       Plan    Plan Return to [facility] once [employer] employee pre cert is obtained    Patient/Family in Agreement with Plan yes    Plan Comments CCP received callback from Jhon/[facility]  ([phone] ext [phone]). Took phone to bedside so that patient could speak with Jhon/Christiano. Patient consents to Jhon speaking with claire/Corey to proceed with pre-cert arrangements as patient is extremely [place] and communication with her is difficult. Jhon states she is unable to call nephew/Corey until he calls her and consents to her contacting him. She confirms she has correct number for Corey on file. CCP spoke with claire/Corey to update and Corey confirms he will contact Jhon/Christiano to consent to her calling him to coordinate discharge planning. Updated Scott on progress. Also updated RN and Dr. Odell. Anticipate patient will require EMS. Packet in CCP office. Elsa Michael rn/ccp    Row Name 11/03/21 1503       Plan    Plan Return to [facility] once [employer] employee pre cert is obtained    Patient/Family in Agreement with Plan yes    Plan Comments CCP attempted to reach [facility]  ([phone] ext [phone]) twice. Did not receive call back. Awaiting  response to proceed with obtaininig pre cert for SNF. Elsa Michael RN/CCP               Discharge Codes    No documentation.               Expected Discharge Date and Time     Expected Discharge Date Expected Discharge Time    Nov 4, 2021             Jody Michael

## 2021-11-03 NOTE — PLAN OF CARE
Goal Outcome Evaluation:  Plan of Care Reviewed With: patient        Progress: no change  Outcome Summary: VSS, pt aflutter/afib this shift. Pt on 2L. Pt confused at times. Q2 turn and mepilex changed on bottom. Stopped abx. Bladder scan showed 97, no c/o bladder pain or fullness. [facility] is working on pre-cert with insurance to transfer patient to rehab soon. Will CTM, safety maintained.

## 2021-11-03 NOTE — PROGRESS NOTES
" LOS: 3 days     Name: Shira Davila  Age: 89 y.o.  Sex: female  :  1921  MRN: [id number]         Primary Care Physician: Aletha Rincon MD    Subjective   Subjective  Patient is awake, alert, and oriented today.  Able to answer all orientation questions for me.  Knew the current and previous president.  No specific complaints today.    Objective   Vital Signs  Temp:  [97.6 °F (36.4 °C)-98.3 °F (36.8 °C)] 98 °F (36.7 °C)  Heart Rate:  [69] 69  Resp:  [16] 16  BP: (114-133)/(52-71) 133/71  Body mass index is 21.49 kg/m².    Objective:  General Appearance:  Comfortable, in no acute distress and ill-appearing (Chronically ill-appearing, weak and deconditioned.  Looks her stated age of 89).    Vital signs: (most recent): Blood pressure 133/71, pulse 69, temperature 98 °F (36.7 °C), temperature source Oral, resp. rate 16, height 162.6 cm (64\"), weight 56.8 kg (125 lb 3.5 oz), SpO2 96 %, not currently breastfeeding.    Lungs:  Normal effort and normal respiratory rate.  There are decreased breath sounds.    Heart: Normal rate.  Regular rhythm.    Abdomen: Abdomen is soft.  Bowel sounds are normal.   There is no abdominal tenderness.     Extremities: There is no dependent edema or local swelling.    Neurological: Patient is alert and oriented to person, place and time.    Skin:  Warm and dry.              Results Review:       I reviewed the patient's new clinical results.    Results from last 7 days   Lab Units 21  0534 21  0604 21  0400 10/31/21  0505 10/30/21  1252 10/29/21  1139   WBC 10*3/mm3 7.26 7.08 6.82 7.75 9.01 8.12   HEMOGLOBIN g/dL 12.4 12.1 12.0 13.5 13.5 14.4   PLATELETS 10*3/mm3 220 226 215 211 227 232     Results from last 7 days   Lab Units 21  0534 21  0604 21  0400 10/31/21  0505 10/29/21  1139   SODIUM mmol/L 130* 131* 131* 130* 129*   POTASSIUM mmol/L 4.6 4.5 4.4 4.2 4.8   CHLORIDE mmol/L 95* 96* 96* 95* 92*   CO2 mmol/L 30.1* 28.4 27.7 26.2 29.1*   BUN " mg/dL 18 17 20 17 18   CREATININE mg/dL 0.60 0.59 0.66 0.50* 0.87   CALCIUM mg/dL 9.0 8.9 8.6 8.6 9.1   GLUCOSE mg/dL 98 98 90 97 106*                 Scheduled Meds:   apixaban, 2.5 mg, Oral, Q12H  aspirin, 81 mg, Oral, Daily  famotidine, 20 mg, Oral, Daily  furosemide, 40 mg, Oral, Daily  metoprolol succinate XL, 25 mg, Oral, Q24H  oxybutynin XL, 5 mg, Oral, Daily  PARoxetine, 10 mg, Oral, Nightly  polyethylene glycol, 17 g, Oral, Daily  sennosides-docusate, 2 tablet, Oral, Nightly  vitamin B-12, 500 mcg, Oral, Daily      PRN Meds:   •  acetaminophen  •  clonazePAM  •  melatonin  •  nitroglycerin  •  ondansetron **OR** ondansetron  •  [COMPLETED] Insert peripheral IV **AND** sodium chloride  Continuous Infusions:       Assessment/Plan   Active Hospital Problems    Diagnosis  POA   • **Urinary tract infection without hematuria [N39.0]  Yes   • Acute cognitive decline [R41.89]  Unknown   • Anxiety [F41.9]  Yes   • Coronary artery disease [I25.10]  Yes   • Benign essential HTN [I10]  Yes   • Chronic fatigue [R53.82]  Yes   • Chronic systolic CHF (congestive heart failure) (HCC) [I50.22]  Yes   • Chronic constipation [K59.09]  Yes   • Abdominal pain, vomiting, and diarrhea [R10.9, R11.10, R19.7]  Yes   • Disease of thyroid gland [E07.9]  Yes      Resolved Hospital Problems   No resolved problems to display.       Assessment & Plan    -She has now received ample antibiotics for uncomplicated urinary tract infection and will discontinue today.  She received 5 days total.  -She has been reportedly confused pretty much since her presentation however is completely awake and oriented for me today.  Certainly could have some waxing and waning delirium but hopefully mentation will only further improve from here.  -Hyponatremia noted but appears stable.  Monitor for now.  -Out of bed with physical therapy as much as possible.  She is pretty weak and deconditioned  -CCP to work on discharge planning.  Barring any new medical  issues she should be ready for discharge once planning completed.      I wore full protective equipment throughout the patient encounter including eye protection and facemask.  Hand hygiene was performed before donning protective equipment and after removal when leaving the room.    Arsh Vargas MD  [facility]  11/03/21  12:38 EDT

## 2021-11-03 NOTE — PLAN OF CARE
Goal Outcome Evaluation:           Progress: no change  Outcome Summary: More confused this shift and rested most of the shift. Pt turned q 2 hrs. Pressure injury more open with BM. Mepilex placed. Pt had no c/o pain. Cont to monitor, safety maintained.

## 2021-11-03 NOTE — CASE MANAGEMENT/SOCIAL WORK
Continued Stay Note  [facility]     Patient Name: Shira Davila  MRN: [id number]  Today's Date: 11/3/2021    Admit Date: 10/29/2021     Discharge Plan     Row Name 11/03/21 1505       Plan    [facility]    Patient/Family in Agreement with Plan yes    Plan Comments CCP attempted to reach Jhon/Christiano  ([phone] ext [phone]) twice. Did not receive call back. Awaiting  response to proceed with obtaininig pre cert for SNF. Elsa Michael, RN/CCP               Discharge Codes    No documentation.               Expected Discharge Date and Time     Expected Discharge Date Expected Discharge Time    Nov 4, 2021             Jody Michael

## 2021-11-04 VITALS
BODY MASS INDEX: 21.08 KG/M2 | RESPIRATION RATE: 16 BRPM | OXYGEN SATURATION: 95 % | WEIGHT: 123.46 LBS | SYSTOLIC BLOOD PRESSURE: 138 MMHG | HEART RATE: 82 BPM | HEIGHT: 64 IN | DIASTOLIC BLOOD PRESSURE: 87 MMHG | TEMPERATURE: 97.6 F

## 2021-11-04 RX ORDER — CLONAZEPAM 0.5 MG/1
0.5 TABLET ORAL 2 TIMES DAILY PRN
Qty: 6 TABLET | Refills: 0 | Status: SHIPPED | OUTPATIENT
Start: 2021-11-04

## 2021-11-04 RX ADMIN — ASPIRIN 81 MG: 81 TABLET, COATED ORAL at 08:51

## 2021-11-04 RX ADMIN — FUROSEMIDE 40 MG: 40 TABLET ORAL at 08:51

## 2021-11-04 RX ADMIN — APIXABAN 2.5 MG: 2.5 TABLET, FILM COATED ORAL at 08:51

## 2021-11-04 RX ADMIN — Medication 500 MCG: at 08:51

## 2021-11-04 RX ADMIN — POLYETHYLENE GLYCOL 3350 17 G: 17 POWDER, FOR SOLUTION ORAL at 08:52

## 2021-11-04 RX ADMIN — METOPROLOL SUCCINATE 25 MG: 25 TABLET, EXTENDED RELEASE ORAL at 08:51

## 2021-11-04 RX ADMIN — FAMOTIDINE 20 MG: 20 TABLET, FILM COATED ORAL at 08:54

## 2021-11-04 RX ADMIN — ACETAMINOPHEN 650 MG: 325 TABLET, FILM COATED ORAL at 12:52

## 2021-11-04 RX ADMIN — Medication 3 MG: at 01:49

## 2021-11-04 NOTE — DISCHARGE SUMMARY
Date of Admission: 10/29/2021  Date of Discharge:  11/4/2021  Primary Care Physician: Aletha Rincon MD     Discharge Diagnosis:  Active Hospital Problems    Diagnosis  POA   • **Urinary tract infection without hematuria [N39.0]  Yes   • Acute cognitive decline [R41.89]  Unknown   • Atrial fibrillation (HCC) [I48.91]  Unknown   • Anxiety [F41.9]  Yes   • Coronary artery disease [I25.10]  Yes   • Benign essential HTN [I10]  Yes   • Chronic fatigue [R53.82]  Yes   • Chronic systolic CHF (congestive heart failure) (HCC) [I50.22]  Yes   • Chronic constipation [K59.09]  Yes   • Abdominal pain, vomiting, and diarrhea [R10.9, R11.10, R19.7]  Yes   • Disease of thyroid gland [E07.9]  Yes      Resolved Hospital Problems   No resolved problems to display.       DETAILS OF HOSPITAL STAY     Pertinent Test Results and Procedures Performed    Chest x-ray:  Findings of CHF with cardiomegaly with vascular engorgement.   Borderline hydrostatic pulmonary edema. Moderate bilateral pleural   effusions that have increased in size when compared to the prior exam   with associated bibasilar atelectasis or infiltrates.     Hospital Course  This is a 89-year-old female with dementia who presented to the emergency room for decreased functional status at her nursing home.  Please see H&P for full details of admission.  There was concern for UTI and pneumonia upon presentation.  She was started on Rocephin and completed a 5-day course of treatment for these potential issues.  Urine culture grew less than 25,000 gram-negative bacilli.  She seemed to respond to the antibiotics without issue.  With her advanced age and dementia I would not expect her cognitive and functional status to be very good at baseline.  She was maintained on a dysphagia/puréed diet.  She is medically stable at this time and we will release her back to the nursing home today.    Physical Exam at Discharge:  General: No acute distress, mostly awake and oriented.   Confusion waxes and wanes.  She is elderly, frail, deconditioned  HEENT: EOMI, PERRL  Cardiovascular: +s1 and s2, RRR  Lungs: No rhonchi or wheezing  Abdomen: soft, nontender    Consults:   Consults     Date and Time Order Name Status Description    10/29/2021  2:28 PM [facility] (on-call MD unless specified) Details Completed     10/13/2021 10:35 AM Inpatient Nephrology Consult Completed     10/5/2021 10:10 PM Inpatient Cardiology Consult Completed     10/5/2021  8:47 PM [facility] (on-call MD unless specified) Details Completed             Condition on Discharge: Stable    Discharge Disposition  Skilled Nursing Facility ([place] - External)    Discharge Medications     Discharge Medications      Continue These Medications      Instructions Start Date   apixaban 2.5 MG tablet tablet  Commonly known as: ELIQUIS   2.5 mg, Oral, Every 12 Hours Scheduled      aspirin 81 MG tablet   81 mg, Oral, Daily      clonazePAM 0.5 MG tablet  Commonly known as: KlonoPIN   0.5 mg, Oral, 2 Times Daily PRN      famotidine 20 MG tablet  Commonly known as: PEPCID   20 mg, Oral, Daily      furosemide 40 MG tablet  Commonly known as: LASIX   40 mg, Oral, Daily      Metoprolol Succinate 25 MG capsule extended-release 24 hour sprinkle   25 mg, Oral, Daily      nystatin-zinc oxide   Apply to bid to skin      PARoxetine 10 MG tablet  Commonly known as: PAXIL   10 mg, Oral, Nightly      polyethylene glycol 17 g packet  Commonly known as: MIRALAX   17 g, Oral, Daily      sennosides-docusate 8.6-50 MG per tablet  Commonly known as: PERICOLACE   2 tablets, Oral, Nightly      solifenacin 5 MG tablet  Commonly known as: VESICARE   TAKE 1 TABLET BY MOUTH EVERY DAY      vitamin B-12 500 MCG tablet  Commonly known as: CYANOCOBALAMIN   500 mcg, Oral, Daily             Discharge Diet:   Diet Instructions     Diet: Dysphagia; Thin Liquids, No Restrictions; Pureed      Discharge Diet: Dysphagia    Fluid Consistency: Thin Liquids, No Restrictions    Pureed Options: Pureed           Activity at Discharge:   Activity Instructions     Activity as Tolerated            Follow-up Appointments  No future appointments.  Additional Instructions for the Follow-ups that You Need to Schedule     Discharge Follow-up with PCP   As directed       Currently Documented PCP:    Aletha Rincon MD    PCP Phone Number:    [phone]     Follow Up Details: 1 week               I have examined and discussed discharge planning with the patient today.    I wore full protective equipment throughout the patient encounter including eye protection and facemask.  Hand hygiene was performed before donning protective equipment and after removal when leaving the room.     Arsh Vargas MD  11/04/21  12:45 EDT    Time: Discharge greater than 30 min

## 2021-11-04 NOTE — PLAN OF CARE
Goal Outcome Evaluation:  Plan of Care Reviewed With: patient        Progress: improving  Outcome Summary: VSS, Afib/flutter on monitor rate controlled, pt incont of urine and Purewick in place, incont small amt of stool, grant po well, confused at times upon awakening reorients easily, pt reports anxious and scared, prn med as ordered, safety maintained

## 2021-11-04 NOTE — CASE MANAGEMENT/SOCIAL WORK
Case Management Discharge Note      Final Note: [facility] [place] [facility] Nephew/Corey notified    Provided Post Acute Provider List?: N/A  N/A Provider List Comment: patient's nephew Corey Cruz plans for patient to return to [facility] Continued Care - Admitted Since 10/29/2021     Destination Coordination complete.    Service Provider Selected Services Address Phone Fax Patient Preferred    [facility]  Skilled Nursing [address] [address] [phone] --          Durable Medical Equipment    No services have been selected for the patient.              Dialysis/Infusion    No services have been selected for the patient.              Home Medical Care    No services have been selected for the patient.              Therapy    No services have been selected for the patient.              Community Resources    No services have been selected for the patient.              Community & DME    No services have been selected for the patient.                Selected Continued Care - Prior Encounters Includes selections from prior encounters from 7/31/2021 to 11/4/2021    Discharged on 10/14/2021 Admission date: 10/5/2021 - Discharge disposition: Skilled Nursing Facility ([place] - External)    Destination     Service Provider Selected Services Address Phone Fax Patient Preferred    [facility]  Skilled Nursing [address] [address] [phone] --          Home Medical Care     Service Provider Selected Services Address Phone Fax Patient Preferred    [facility]  [facility] [address], [address] [address] [phone] --                    Transportation Services  Ambulance: [facility]    Final Discharge Disposition Code: 03 - skilled nursing facility (SNF)

## 2021-11-04 NOTE — PROGRESS NOTES
" LOS: 4 days     Name: Shira Davila  Age: 89 y.o.  Sex: female  :  1921  MRN: [id number]         Primary Care Physician: Aletha Rincon MD    Subjective   Subjective  No new complaints.  Confusion waxes and wanes little bit but overall remained stable past 24 hours from a cognitive standpoint.  She is calm and pleasant with me today    Objective   Vital Signs  Temp:  [97.3 °F (36.3 °C)-98 °F (36.7 °C)] 97.3 °F (36.3 °C)  Heart Rate:  [65-78] 71  Resp:  [16-18] 16  BP: (114-146)/(69-91) 129/91  Body mass index is 21.19 kg/m².    Objective:  General Appearance:  Comfortable, in no acute distress and ill-appearing (Chronically ill-appearing, weak and deconditioned.).    Vital signs: (most recent): Blood pressure 129/91, pulse 71, temperature 97.3 °F (36.3 °C), temperature source Oral, resp. rate 16, height 162.6 cm (64\"), weight 56 kg (123 lb 7.3 oz), SpO2 98 %, not currently breastfeeding.    Lungs:  Normal effort and normal respiratory rate.    Heart: Normal rate.  Regular rhythm.    Abdomen: Abdomen is soft.  Bowel sounds are normal.   There is no abdominal tenderness.     Extremities: There is no dependent edema or local swelling.    Neurological: Patient is alert and oriented to person, place and time.    Skin:  Warm and dry.              Results Review:       I reviewed the patient's new clinical results.    Results from last 7 days   Lab Units 21  0534 21  0604 21  0400 10/31/21  0505 10/30/21  1252 10/29/21  1139   WBC 10*3/mm3 7.26 7.08 6.82 7.75 9.01 8.12   HEMOGLOBIN g/dL 12.4 12.1 12.0 13.5 13.5 14.4   PLATELETS 10*3/mm3 220 226 215 211 227 232     Results from last 7 days   Lab Units 21  0534 21  0604 21  0400 10/31/21  0505 10/29/21  1139   SODIUM mmol/L 130* 131* 131* 130* 129*   POTASSIUM mmol/L 4.6 4.5 4.4 4.2 4.8   CHLORIDE mmol/L 95* 96* 96* 95* 92*   CO2 mmol/L 30.1* 28.4 27.7 26.2 29.1*   BUN mg/dL 18 17 20 17 18   CREATININE mg/dL 0.60 0.59 0.66 " 0.50* 0.87   CALCIUM mg/dL 9.0 8.9 8.6 8.6 9.1   GLUCOSE mg/dL 98 98 90 97 106*                 Scheduled Meds:   apixaban, 2.5 mg, Oral, Q12H  aspirin, 81 mg, Oral, Daily  famotidine, 20 mg, Oral, Daily  furosemide, 40 mg, Oral, Daily  metoprolol succinate XL, 25 mg, Oral, Q24H  oxybutynin XL, 5 mg, Oral, Daily  PARoxetine, 10 mg, Oral, Nightly  polyethylene glycol, 17 g, Oral, Daily  sennosides-docusate, 2 tablet, Oral, Nightly  vitamin B-12, 500 mcg, Oral, Daily      PRN Meds:   •  acetaminophen  •  clonazePAM  •  melatonin  •  nitroglycerin  •  ondansetron **OR** ondansetron  •  [COMPLETED] Insert peripheral IV **AND** sodium chloride  Continuous Infusions:       Assessment/Plan   Active Hospital Problems    Diagnosis  POA   • **Urinary tract infection without hematuria [N39.0]  Yes   • Acute cognitive decline [R41.89]  Unknown   • Atrial fibrillation (HCC) [I48.91]  Unknown   • Anxiety [F41.9]  Yes   • Coronary artery disease [I25.10]  Yes   • Benign essential HTN [I10]  Yes   • Chronic fatigue [R53.82]  Yes   • Chronic systolic CHF (congestive heart failure) (HCC) [I50.22]  Yes   • Chronic constipation [K59.09]  Yes   • Abdominal pain, vomiting, and diarrhea [R10.9, R11.10, R19.7]  Yes   • Disease of thyroid gland [E07.9]  Yes      Resolved Hospital Problems   No resolved problems to display.       Assessment & Plan    -She completed a 5-day course of Rocephin for uncomplicated urinary tract infection.  -Confusion waxes and wanes little bit but overall stable.  She is calm and reasonably oriented for me today.  -Hyponatremia noted but appears stable.  Monitor for now.  -Out of bed with physical therapy as much as possible.  She is pretty weak and deconditioned  -CCP to work on discharge planning.  Barring any new medical issues she should be ready for discharge once planning completed.      I wore full protective equipment throughout the patient encounter including eye protection and facemask.  Hand hygiene  was performed before donning protective equipment and after removal when leaving the room.    Arsh Vargas MD  [facility]  11/04/21  12:03 EDT

## 2021-11-04 NOTE — CASE MANAGEMENT/SOCIAL WORK
"Physicians Statement of Medical Necessity for  Ambulance Transportation    GENERAL INFORMATION     Name: Shira Davila  YOB: 1921  [employer] #: [id number]  Transport Date: 11/4/2021  (Valid for round trips this date, or for scheduled repetitive trips for 60 days from the date signed below.)  Origin: [facility]  Destination: [address] [address]  Is the Patient's stay covered under Medicare Part A (PPS/DRG?)No   Closest appropriate facility? Yes  If this a hosp-hosp transfer? No  Is this a hospice patient? No    MEDICAL NECESSITY QUESTIONAIRE    Ambulance Transportation is medically necessary only if other means of transportation are contraindicated or would be potentially harmful to the patient.  To meet this requirement, the patient must be either \"bed confined\" or suffer from a condition such that transport by means other than an ambulance is contraindicated by the patient's condition.  The following questions must be answered by the healthcare professional signing below for this form to be valid:     1) Describe the MEDICAL CONDITION (physical and/or mental) of this patient AT THE TIME OF AMBULANCE TRANSPORT that requires the patient to be transported in an ambulance, and why transport by other means is contraindicated by the patient's condition: acute cognitive decline, poor mobility, heart failure  Past Medical History:   Diagnosis Date   • Anxiety    • Arthritis    • CHF (congestive heart failure) (HCC)    • Coronary artery disease    • Disease of thyroid gland 3/16/2016   • Diverticulitis    • Hypertension       Past Surgical History:   Procedure Laterality Date   • ABDOMINAL SURGERY      liban   • APPENDECTOMY     • COLON SURGERY      fistulectomy   • EYE SURGERY      cataract   • HYSTERECTOMY     • TONSILLECTOMY        2) Is this patient \"bed confined\" as defined below?Yes   To be \"bed confined\" the patient must satisfy all " three of the following criteria:  (1) unable to get up from bed without assistance; AND (2) unable to ambulate;  AND (3) unable to sit in a chair or wheelchair.  3) Can this patient safely be transported by car or wheelchair van (I.e., may safely sit during transport, without an attendant or monitoring?)No   4. In addition to completing questions 1-3 above, please check any of the following conditions that apply*:          *Note: supporting documentation for any boxes checked must be maintained in the patient's medical records Patient is confused, Medical attendant required, Requires oxygen - unable to self administer and Other patient is dependent x2 for mobility, falls risk, non ambulatory      SIGNATURE OF PHYSICIAN OR OTHER AUTHORIZED HEALTHCARE PROFESSIONAL    I certify that the above information is true and correct based on my evaluation of this patient, and represent that the patient requires transport by ambulance and that other forms of transport are contraindicated.  I understand that this information will be used by the Centers for Medicare and Medicaid Services (CMS) to support the determiniation of medical necessity for ambulance services, and I represent that I have personal knowledge of the patient's condition at the time of transport.    x   If this box is checked, I also certify that the patient is physically or mentally incapable of signing the ambulance service's claim form and that the institution with which I am affiliated has furnished care, services or assistance to the patient.  My signature below is made on behalf of the patient pursuant to 42 .36(b)(4). In accordance with 42 .37, the specific reason(s) that the patient is physically or mentally incapable of signing the claim for is as follows: patient is confused    Signature of Physician or Healthcare Professional  Date/Time:   11/4/2021 14:09 EDT       (For Scheduled repetitive transport, this form is not valid for  transports performed more than 60 days after this date).                                                                                                                                            --------------------mike rojo----------------------------  Printed Name and Credentials of Physician or Authorized Healthcare Professional     *Form must be signed by patient's attending physician for scheduled, repetitive transports,.  For non-repetitive ambulance transports, if unable to obtain the signature of the attending physician, any of the following may sign (please select below):     Physician  Clinical Nurse Specialist x Registered Nurse     Physician Assistant x Discharge Planner  Licensed Practical Nurse     Nurse Practitioner x

## 2021-11-05 NOTE — PAYOR COMM NOTE
"Shira Davila (89 y.o. Female)     PLEASE SEE ATTACHED DC SUMMARY    REF#[id number]    THANK YOU    JEAN REAL LPN CCP            Date of Birth Social Security Number Address Home Phone MRN    11/19/1921  [facility]  [address] [facility] [place] [address] [phone] [id number]    Religious Marital Status             Taoism Single       Admission Date Admission Type Admitting Provider Attending Provider Department, Room/Bed    10/29/21 Emergency StinglJacy MD  [address], [address]    Discharge Date Discharge Disposition Discharge Destination          11/4/2021 Skilled Nursing Facility ([place] - External)              Attending Provider: (none)   Allergies: Cephalexin, Atorvastatin Calcium, Sesame Oil, Amoxicillin, Cortisone, Diazepam, Doxycycline, Erythromycin, Horse-derived Products, Levoxyl [Levothyroxine Sodium], Lexapro [Escitalopram Oxalate], Lipitor [Atorvastatin], Lopressor [Metoprolol Tartrate], Metaxalone, Omeprazole, Penicillins, Sesame Seed (Diagnostic), Solarcaine [Benzocaine], Sulfa Antibiotics    Isolation: None   Infection: None   Code Status: Prior   Advance Care Planning Activity    Ht: 162.6 cm (64\")   Wt: 56 kg (123 lb 7.3 oz)    Admission Cmt: None   Principal Problem: Urinary tract infection without hematuria [N39.0]                 Active Insurance as of 10/29/2021     Primary Coverage     Payor Plan Insurance Group Employer/Plan Group    [place] [id number]     Payor Plan Address Payor Plan Phone Number Payor Plan Fax Number Effective Dates    [address]   1/1/1988 - None Entered    [place] [id number]       Subscriber Name Subscriber Birth Date Member ID       SHIRA DAVILA 11/19/1921 [id number]                 Emergency Contacts      (Rel.) Home Phone Work Phone Mobile Phone    SADIE (NEPHEW)DOMINICK (Relative) [phone] -- [phone]    Kylee Rivera (Friend) [phone] -- --    Omar Tavares " (Cousin) (Relative) [phone] -- [phone]    Carlos Browne (Friend) [phone] -- --               Discharge Summary      Arsh Vargas MD at 11/04/21 1245            Date of Admission: 10/29/2021  Date of Discharge:  11/4/2021  Primary Care Physician: Aletha Rincon MD     Discharge Diagnosis:  Active Hospital Problems    Diagnosis  POA   • **Urinary tract infection without hematuria [N39.0]  Yes   • Acute cognitive decline [R41.89]  Unknown   • Atrial fibrillation (HCC) [I48.91]  Unknown   • Anxiety [F41.9]  Yes   • Coronary artery disease [I25.10]  Yes   • Benign essential HTN [I10]  Yes   • Chronic fatigue [R53.82]  Yes   • Chronic systolic CHF (congestive heart failure) (HCC) [I50.22]  Yes   • Chronic constipation [K59.09]  Yes   • Abdominal pain, vomiting, and diarrhea [R10.9, R11.10, R19.7]  Yes   • Disease of thyroid gland [E07.9]  Yes      Resolved Hospital Problems   No resolved problems to display.       DETAILS OF HOSPITAL STAY     Pertinent Test Results and Procedures Performed    Chest x-ray:  Findings of CHF with cardiomegaly with vascular engorgement.   Borderline hydrostatic pulmonary edema. Moderate bilateral pleural   effusions that have increased in size when compared to the prior exam   with associated bibasilar atelectasis or infiltrates.     Hospital Course  This is a 89-year-old female with dementia who presented to the emergency room for decreased functional status at her nursing home.  Please see H&P for full details of admission.  There was concern for UTI and pneumonia upon presentation.  She was started on Rocephin and completed a 5-day course of treatment for these potential issues.  Urine culture grew less than 25,000 gram-negative bacilli.  She seemed to respond to the antibiotics without issue.  With her advanced age and dementia I would not expect her cognitive and functional status to be very good at baseline.  She was maintained on a dysphagia/puréed  diet.  She is medically stable at this time and we will release her back to the nursing home today.    Physical Exam at Discharge:  General: No acute distress, mostly awake and oriented.  Confusion waxes and wanes.  She is elderly, frail, deconditioned  HEENT: EOMI, PERRL  Cardiovascular: +s1 and s2, RRR  Lungs: No rhonchi or wheezing  Abdomen: soft, nontender    Consults:   Consults     Date and Time Order Name Status Description    10/29/2021  2:28 PM [facility] (on-call MD unless specified) Details Completed     10/13/2021 10:35 AM Inpatient Nephrology Consult Completed     10/5/2021 10:10 PM Inpatient Cardiology Consult Completed     10/5/2021  8:47 PM [facility] (on-call MD unless specified) Details Completed             Condition on Discharge: Stable    Discharge Disposition  Skilled Nursing Facility ([place] - External)    Discharge Medications     Discharge Medications      Continue These Medications      Instructions Start Date   apixaban 2.5 MG tablet tablet  Commonly known as: ELIQUIS   2.5 mg, Oral, Every 12 Hours Scheduled      aspirin 81 MG tablet   81 mg, Oral, Daily      clonazePAM 0.5 MG tablet  Commonly known as: KlonoPIN   0.5 mg, Oral, 2 Times Daily PRN      famotidine 20 MG tablet  Commonly known as: PEPCID   20 mg, Oral, Daily      furosemide 40 MG tablet  Commonly known as: LASIX   40 mg, Oral, Daily      Metoprolol Succinate 25 MG capsule extended-release 24 hour sprinkle   25 mg, Oral, Daily      nystatin-zinc oxide   Apply to bid to skin      PARoxetine 10 MG tablet  Commonly known as: PAXIL   10 mg, Oral, Nightly      polyethylene glycol 17 g packet  Commonly known as: MIRALAX   17 g, Oral, Daily      sennosides-docusate 8.6-50 MG per tablet  Commonly known as: PERICOLACE   2 tablets, Oral, Nightly      solifenacin 5 MG tablet  Commonly known as: VESICARE   TAKE 1 TABLET BY MOUTH EVERY DAY      vitamin B-12 500 MCG tablet  Commonly known as: CYANOCOBALAMIN   500 mcg, Oral, Daily             Discharge Diet:    Diet Instructions     Diet: Dysphagia; Thin Liquids, No Restrictions; Pureed      Discharge Diet: Dysphagia    Fluid Consistency: Thin Liquids, No Restrictions    Pureed Options: Pureed          Activity at Discharge:   Activity Instructions     Activity as Tolerated            Follow-up Appointments  No future appointments.  Additional Instructions for the Follow-ups that You Need to Schedule     Discharge Follow-up with PCP   As directed       Currently Documented PCP:    Aletha Rincon MD    PCP Phone Number:    [phone]     Follow Up Details: 1 week               I have examined and discussed discharge planning with the patient today.    I wore full protective equipment throughout the patient encounter including eye protection and facemask.  Hand hygiene was performed before donning protective equipment and after removal when leaving the room.     Arsh Vargas MD  11/04/21  12:45 EDT    Time: Discharge greater than 30 min            Electronically signed by Arsh Vargas MD at 11/04/21 8944

## 2021-11-26 RX ORDER — SOLIFENACIN SUCCINATE 5 MG/1
TABLET, FILM COATED ORAL
Qty: 90 TABLET | Refills: 1 | Status: SHIPPED | OUTPATIENT
Start: 2021-11-26

## 2024-06-12 NOTE — ED PROVIDER NOTES
EMERGENCY DEPARTMENT ENCOUNTER    CHIEF COMPLAINT  Chief Complaint: Generalized weakness  History given by: patient   History limited by: n/a  Room Number: 13/13  PMD: Waqar Melissa MD      HPI:  Pt is a 89 y.o. female who presents complaining of generalized weakness and body aches that began today. Pt states the she had difficulty walking today at home secondary to the weakness. Pt also complains of low back pain, but denies recent fall or injury. Pt does have a hx of arthritis. Pt states that at baseline, she is ambulatory with a cane or walker. Pt states that she lost her sister 2 days ago. Pt states that she has 3 dose left of abx secondary to recent UTI and colitis.     Duration:  1 day  Onset: gradual  Timing: constant   Location: generalized   Radiation: none  Quality: weakness  Intensity/Severity: mild  Progression: unchanged   Associated Symptoms: back pain  Aggravating Factors: recent loss of sister  Alleviating Factors: none  Previous Episodes: none  Treatment before arrival: none    PAST MEDICAL HISTORY  Active Ambulatory Problems     Diagnosis Date Noted   • Abdominal pain, vomiting, and diarrhea 03/16/2016   • Chronic constipation 03/16/2016   • Hemorrhoid 03/16/2016   • BP (high blood pressure) 03/16/2016   • Arthralgia of hip 03/16/2016   • LBP (low back pain) 03/16/2016   • Disease of thyroid gland 03/16/2016   • Benign essential HTN 05/28/2017   • Chronic kidney disease, stage III (moderate) 05/28/2017   • Debility 05/28/2017   • Chronic systolic CHF (congestive heart failure) 05/28/2017   • Coronary artery disease 05/29/2017     Resolved Ambulatory Problems     Diagnosis Date Noted   • Colitis presumed infectious (bacterial) 05/28/2017   • Rectal bleeding 05/29/2017   • Abnormal finding in urine 05/29/2017     Past Medical History:   Diagnosis Date   • Arthritis    • CHF (congestive heart failure)    • Coronary artery disease    • Disease of thyroid gland 3/16/2016   • Diverticulitis    •  Hypertension        PAST SURGICAL HISTORY  Past Surgical History:   Procedure Laterality Date   • ABDOMINAL SURGERY      liban   • APPENDECTOMY     • COLON SURGERY      fistulectomy   • EYE SURGERY      cataract   • HYSTERECTOMY     • TONSILLECTOMY         FAMILY HISTORY  Family History   Problem Relation Age of Onset   • Hypertension Other        SOCIAL HISTORY  Social History     Social History   • Marital status: Single     Spouse name: N/A   • Number of children: N/A   • Years of education: N/A     Occupational History   • Not on file.     Social History Main Topics   • Smoking status: Never Smoker   • Smokeless tobacco: Not on file   • Alcohol use Not on file   • Drug use: Not on file   • Sexual activity: Not on file     Other Topics Concern   • Not on file     Social History Narrative       ALLERGIES  Amoxicillin; Atorvastatin; Diazepam; Levoxyl  [levothyroxine sodium]; Lexapro  [escitalopram oxalate]; Lopressor  [metoprolol tartrate]; Metaxalone; Omeprazole; and Penicillins    REVIEW OF SYSTEMS  Review of Systems   Constitutional: Negative for chills and fever.   HENT: Negative for congestion and sore throat.    Eyes: Negative.    Respiratory: Negative for cough and shortness of breath.    Cardiovascular: Negative for chest pain and leg swelling.   Gastrointestinal: Negative for abdominal pain, diarrhea and vomiting.   Genitourinary: Negative for difficulty urinating and dysuria.   Musculoskeletal: Positive for back pain. Negative for neck pain.   Skin: Negative for rash and wound.   Allergic/Immunologic: Negative.    Neurological: Positive for weakness (generalized ). Negative for dizziness, numbness and headaches.   Psychiatric/Behavioral: Negative.    All other systems reviewed and are negative.      PHYSICAL EXAM  ED Triage Vitals   Temp Heart Rate Resp BP SpO2   06/09/17 2114 06/09/17 2114 06/09/17 2114 06/09/17 2114 06/09/17 2114   97.3 °F (36.3 °C) 80 20 140/76 96 %      Temp src Heart Rate Source  Patient Position BP Location FiO2 (%)   -- -- -- -- --              Physical Exam   Constitutional: She is oriented to person, place, and time and well-developed, well-nourished, and in no distress. No distress.   HENT:   Head: Normocephalic and atraumatic.   Eyes: EOM are normal. Pupils are equal, round, and reactive to light.   Neck: Normal range of motion. Neck supple.   Cardiovascular: Normal rate, regular rhythm and normal heart sounds.    Pulmonary/Chest: Effort normal and breath sounds normal. No respiratory distress.   Abdominal: Soft. There is no tenderness. There is no rebound and no guarding.   Musculoskeletal: Normal range of motion. She exhibits edema (chronic BLE).        Lumbar back: She exhibits tenderness (midline). She exhibits no deformity.   Moves all extremities.   Neurological: She is alert and oriented to person, place, and time.   Skin: Skin is warm and dry. No rash noted.   Psychiatric: Mood and affect normal. Her mood appears anxious.   Tearful and anxious.    Nursing note and vitals reviewed.      LAB RESULTS  Lab Results (last 24 hours)     Procedure Component Value Units Date/Time    CBC & Differential [[id number]] Collected:  06/09/17 2135    Specimen:  Blood Updated:  06/09/17 2149    Narrative:       The following orders were created for panel order CBC & Differential.  Procedure                               Abnormality         Status                     ---------                               -----------         ------                     CBC Auto Differential[[id number]]        Abnormal            Final result                 Please view results for these tests on the individual orders.    Comprehensive Metabolic Panel [[id number]]  (Abnormal) Collected:  06/09/17 2135    Specimen:  Blood Updated:  06/09/17 2205     Glucose 107 (H) mg/dL      BUN 19 mg/dL      Creatinine 1.12 (H) mg/dL      Sodium 132 (L) mmol/L      Potassium 4.5 mmol/L      Chloride 95 (L) mmol/L      CO2 24.4  mmol/L      Calcium 9.2 mg/dL      Total Protein 7.0 g/dL      Albumin 3.90 g/dL      ALT (SGPT) 16 U/L      AST (SGOT) 24 U/L      Alkaline Phosphatase 44 U/L      Total Bilirubin 0.3 mg/dL      eGFR Non African Amer 45 (L) mL/min/1.73      Globulin 3.1 gm/dL      A/G Ratio 1.3 g/dL      BUN/Creatinine Ratio 17.0     Anion Gap 12.6 mmol/L     Narrative:       The MDRD GFR formula is only valid for adults with stable renal function between ages 18 and 70.    CBC Auto Differential [[id number]]  (Abnormal) Collected:  06/09/17 2135    Specimen:  Blood Updated:  06/09/17 2149     WBC 7.36 10*3/mm3      RBC 4.61 10*6/mm3      Hemoglobin 13.6 g/dL      Hematocrit 40.7 %      MCV 88.3 fL      MCH 29.5 pg      MCHC 33.4 g/dL      RDW 15.0 (H) %      RDW-SD 48.5 fl      MPV 11.2 fL      Platelets 221 10*3/mm3      Neutrophil % 63.0 %      Lymphocyte % 24.3 %      Monocyte % 9.8 %      Eosinophil % 2.4 %      Basophil % 0.5 %      Immature Grans % 0.0 %      Neutrophils, Absolute 4.63 10*3/mm3      Lymphocytes, Absolute 1.79 10*3/mm3      Monocytes, Absolute 0.72 10*3/mm3      Eosinophils, Absolute 0.18 10*3/mm3      Basophils, Absolute 0.04 10*3/mm3      Immature Grans, Absolute 0.00 10*3/mm3     Urinalysis With / Culture If Indicated [[id number]]  (Abnormal) Collected:  06/09/17 2340    Specimen:  Urine from Urine, Clean Catch Updated:  06/09/17 2354     Color, UA Yellow     Appearance, UA Cloudy (A)     pH, UA 7.0     Specific Gravity, UA 1.007     Glucose, UA Negative     Ketones, UA Negative     Bilirubin, UA Negative     Blood, UA Negative     Protein, UA Negative     Leuk Esterase, UA Large (3+) (A)     Nitrite, UA Positive (A)     Urobilinogen, UA 0.2 E.U./dL    Urinalysis, Microscopic Only [[id number]]  (Abnormal) Collected:  06/09/17 2340    Specimen:  Urine from Urine, Clean Catch Updated:  06/09/17 2354     RBC, UA 0-2 /HPF      WBC, UA Too Numerous to Count (A) /HPF      Bacteria, UA 4+ (A) /HPF      Squamous  Epithelial Cells, UA 0-2 /HPF      Hyaline Casts, UA None Seen /LPF      Methodology Automated Microscopy    Urine Culture [[id number]] Collected:  06/09/17 2340    Specimen:  Urine from Urine, Clean Catch Updated:  06/09/17 2350          I ordered the above labs and reviewed the results    RADIOLOGY  XR Spine Lumbar 4+ View   Preliminary Result   Moderate spondylosis, no acute fracture.                I ordered the above noted radiological studies. Interpreted by radiologist. Reviewed by me in PACS.       PROCEDURES  Procedures      PROGRESS AND CONSULTS  ED Course     21:18  Labs ordered for further evaluation.     23:25  BP- 166/86 HR- 78 Temp- 97.3 °F (36.3 °C) O2 sat- 98%  Advised pt of the plan for XR lumbar spine. Pt understands and agrees with the plan, all questions answered.    23:39  XR lumbar spine ordered for further evaluation.     00:55  Rocephin ordered to treat UTI.     02:06  BP- 167/71 HR- 69 Temp- 97.2 °F (36.2 °C) (Tympanic) O2 sat- 96%  Rechecked the patient who is in NAD and is resting comfortably. Advised pt that the XR lumbar spine shows NAD. Advised her that the UTI is still present, will start additional abx. Pt will be discharged. Pt understands and agrees with the plan, all questions answered.    MEDICAL DECISION MAKING  Results were reviewed/discussed with the patient and they were also made aware of online access. Pt also made aware that some labs, such as cultures, will not be resulted during ER visit and follow up with PMD is necessary.     MDM  Number of Diagnoses or Management Options  Acute midline low back pain without sciatica:   Generalized weakness:   UTI (urinary tract infection), bacterial:      Amount and/or Complexity of Data Reviewed  Clinical lab tests: ordered and reviewed (Urinalysis- RBC 0-2, WBC too numerous to count , Bacteria 4+  )  Tests in the radiology section of CPT®: reviewed and ordered (XR lumbar spine shows moderate spondylosis, NAD)  Decide to obtain  disease(s): thyroiditis.  He  of \"natural causes\" at age 87.    The mother has the following chronic disease(s): HTN, \"hepatitis\".  85 y/o.   The following family members have liver disease: mother has hepatitis, probably HBV.  Not on treatment.    The following family members have immune disorders: father had thyroiditis, patient has diverticulitis.      SOCIAL HISTORY:  The patient is .    The patient has 2 children and no grandchildren.    The patient stopped using tobacco products in .  About an 8 year pack history.    The patient consumes 10 alcoholic beverages per year.    The patient currently works full time as realtor and .       PHYSICAL EXAMINATION:  BP (!) 158/87   Pulse 67   Temp 96.8 °F (36 °C) (Skin)   Ht 1.499 m (4' 11\")   Wt 64.8 kg (142 lb 12.8 oz)   SpO2 98%   BMI 28.84 kg/m²   General: No acute distress.   Eyes: Sclera anicteric.   ENT: No oral lesions.  Thyroid normal.  Nodes: No adenopathy.   Skin: No spider angiomata.  No jaundice.  No palmar erythema.  Respiratory: Lungs clear to auscultation.   Cardiovascular: Regular heart rate.  No murmurs.  No JVD.  Abdomen: Soft non-tender.  Liver size normal to percussion/palpation.  Spleen not palpable. No obvious ascites.  Extremities: No edema.  No muscle wasting.  No gross arthritic changes.  Neurologic: Alert and oriented.  Cranial nerves grossly intact.  No asterixis.    LABORATORY STUDIES:  From 2024:  AST/ ALT/ ALP/ T.BILI/ ALB:  35/ 29/ 70/ 0.3/ 4.3  BUN/ CRT/ PLT:  14/ 0.67/ 256,000     Latest Ref Rn 3/18/2024   CATHRYN - Routine Labs     WBC 3.4 - 10.8 x10E3/uL 6.3    ANC 1.4 - 7.0 x10E3/uL 3.4    HGB 11.1 - 15.9 g/dL 13.5     - 450 x10E3/uL 282    INR 0.9 - 1.2  0.9    AST 0 - 40 IU/L 34    ALT 0 - 32 IU/L 42 (H)    Alk Phos 44 - 121 IU/L 83    Bili, Total 0.0 - 1.2 mg/dL 0.2    Bili, Direct 0.00 - 0.40 mg/dL <0.10    Albumin 3.9 - 4.9 g/dL 4.4    BUN 8 - 27 mg/dL 13    Creat 0.57 - 1.00 mg/dL 0.74    Na  previous medical records or to obtain history from someone other than the patient: yes  Review and summarize past medical records: yes (Seen on 6/4/17 secondary to peripheral edema. Admitted at the end of May secondary to colitis. )    Patient Progress  Patient progress: stable         DIAGNOSIS  Final diagnoses:   UTI (urinary tract infection), bacterial   Generalized weakness   Acute midline low back pain without sciatica       DISPOSITION  DISCHARGE    Patient discharged in stable condition.    Reviewed implications of results, diagnosis, meds, responsibility to follow up, warning signs and symptoms of possible worsening, potential complications and reasons to return to ER.    Patient/Family voiced understanding of above instructions.    Discussed plan for discharge, as there is no emergent indication for admission.  Pt/family is agreeable and understands need for follow up and repeat testing.  Pt is aware that discharge does not mean that nothing is wrong but it indicates no emergency is present that requires admission and they must continue care with follow-up as given below or physician of their choice.     FOLLOW-UP  Waqar Melissa MD  [address] [address]  [phone]    Schedule an appointment as soon as possible for a visit           Medication List      New Prescriptions          sulfamethoxazole-trimethoprim 800-160 MG per tablet   Commonly known as:  BACTRIM DS   Take 1 tablet by mouth 2 (Two) Times a Day.           Latest Documented Vital Signs:  As of 6:03 AM  BP- 159/70 HR- 68 Temp- 97.2 °F (36.2 °C) (Tympanic) O2 sat- 94%    --  Documentation assistance provided by henrique Martinez for Dr Campbell.  Information recorded by the scribe was done at my direction and has been verified and validated by me.          Mariza Martinez  06/10/17 [id number]       Daniel Campbell MD  06/10/17 2283

## 2024-11-24 NOTE — PROGRESS NOTES
"    Patient Name: Shira Davila  :1921  89 y.o.      Patient Care Team:  Aletha Rincon MD as PCP - General (Family Medicine)    Chief Complaint: Follow up heart failure, atrial fibrillation    Interval History: she is anxious about getting help to the bathroom. Hr documented as 110 this morning but very well controlled during my exam. She denies shortness of breath.     Objective   Vital Signs  Temp:  [97.4 °F (36.3 °C)-98 °F (36.7 °C)] 97.4 °F (36.3 °C)  Heart Rate:  [] 100  Resp:  [16] 16  BP: ()/(60-94) 108/94    Intake/Output Summary (Last 24 hours) at 10/8/2021 1143  Last data filed at 10/8/2021 0900  Gross per 24 hour   Intake 360 ml   Output --   Net 360 ml     Flowsheet Rows      First Filed Value   Admission Height  162.6 cm (64\") Documented at 10/05/2021 2056   Admission Weight  58.5 kg (129 lb) Documented at 10/05/2021 2118          Physical Exam:   General Appearance:    Frail, no acute distress   Lungs:     Clear to auscultation.  Normal respiratory effort and rate.      Heart:    irregular rhythm and normal rate, normal S1 and S2, no murmurs, gallops or rubs.     Chest Wall:    No abnormalities observed   Abdomen:     Soft, nontender, positive bowel sounds.     Extremities:   no cyanosis, clubbing or edema.  No marked joint deformities.  Adequate musculoskeletal strength.       Results Review:    Results from last 7 days   Lab Units 10/08/21  0402   SODIUM mmol/L 136   POTASSIUM mmol/L 4.5   CHLORIDE mmol/L 101   CO2 mmol/L 26.0   BUN mg/dL 23   CREATININE mg/dL 1.20*   GLUCOSE mg/dL 86   CALCIUM mg/dL 9.0     Results from last 7 days   Lab Units 10/06/21  0243 10/05/21  1520   TROPONIN T ng/mL 0.038* 0.045*     Results from last 7 days   Lab Units 10/08/21  0402   WBC 10*3/mm3 6.13   HEMOGLOBIN g/dL 12.4   HEMATOCRIT % 37.2   PLATELETS 10*3/mm3 199     Results from last 7 days   Lab Units 10/05/21  1520   INR  1.10     Results from last 7 days   Lab Units 10/08/21  5306 " Chloe Ortiz is a 21 year old female presenting to the walk-in clinic today alone for STD check. Reports having unprotected sex and is concerned for STDs. Denies symptoms.     Treatment tried prior to visit: NONE    Swabs/Specimens collected during rooming process:  None    Work, School or  note needed: No    New vs Established: Established    Patient would like communication of their results via:      Cell Phone:   Telephone Information:   Mobile [phone]     Okay to leave a message containing results? Yes     MAGNESIUM mg/dL 2.1                   Medication Review:   apixaban, 2.5 mg, Oral, Q12H  aspirin, 81 mg, Oral, Daily  carbamide peroxide, 10 drop, Both Ears, BID  castor oil-balsam peru, 1 application, Topical, Q12H  famotidine, 20 mg, Oral, BID AC  [START ON 10/9/2021] furosemide, 20 mg, Oral, Daily  metoprolol succinate XL, 25 mg, Oral, Q24H  oxybutynin XL, 5 mg, Oral, Daily Before Supper  PARoxetine, 10 mg, Oral, Nightly  polyethylene glycol, 17 g, Oral, Daily  potassium chloride, 10 mEq, Oral, BID  senna-docusate sodium, 2 tablet, Oral, Nightly  vitamin B-12, 500 mcg, Oral, Daily              Assessment/Plan   1.  Right lower extremity DVT  2.  Acute CHF with reduced LV function. EF now 20% with severe global hypokinesis.   3.  History of cardiomyopathy with EF 35 to 40% in 2011  4.  Atrial fibrillation (new)  5.  Stage III chronic kidney disease  6.  Hypertension  7. Nonsustained ventricular tachycardia  8. Minimal pleural effusions    - continue metoprolol succinate. HR well controlled and blood pressure tolerating.   - no room on BP for afterload reduction. She is extremely frail - avoid hypotension.  - changed to oral lasix, creatinine up just a tad. Appears euvolemic. Hold lasix today and resume tomorrow.   - on apixaban    Ok for discharge to skilled facility at any time from cardiac standpoint. Will sign off. Please call with questions.    FRANCISCO Sumner  [facility]  10/08/21  11:43 EDT